# Patient Record
Sex: FEMALE | Race: WHITE | NOT HISPANIC OR LATINO | Employment: UNEMPLOYED | URBAN - METROPOLITAN AREA
[De-identification: names, ages, dates, MRNs, and addresses within clinical notes are randomized per-mention and may not be internally consistent; named-entity substitution may affect disease eponyms.]

---

## 2017-02-28 ENCOUNTER — CONVERSION ENCOUNTER (OUTPATIENT)
Dept: CARDIOLOGY | Facility: CLINIC | Age: 72
End: 2017-02-28

## 2017-06-28 ENCOUNTER — HOSPITAL ENCOUNTER (OUTPATIENT)
Dept: OTHER | Facility: HOSPITAL | Age: 72
Setting detail: RECURRING SERIES
Discharge: HOME | End: 2017-06-29
Attending: FAMILY MEDICINE | Admitting: FAMILY MEDICINE

## 2017-06-28 LAB — CALCIUM SERPL-MCNC: 8.9 MG/DL (ref 8.9–10.3)

## 2017-08-22 ENCOUNTER — OFFICE (AMBULATORY)
Dept: URBAN - METROPOLITAN AREA CLINIC 64 | Facility: CLINIC | Age: 72
End: 2017-08-22
Payer: MEDICARE

## 2017-08-22 ENCOUNTER — ON CAMPUS - OUTPATIENT (AMBULATORY)
Dept: URBAN - METROPOLITAN AREA HOSPITAL 2 | Facility: HOSPITAL | Age: 72
End: 2017-08-22

## 2017-08-22 VITALS
SYSTOLIC BLOOD PRESSURE: 103 MMHG | HEIGHT: 65 IN | DIASTOLIC BLOOD PRESSURE: 51 MMHG | HEART RATE: 57 BPM | DIASTOLIC BLOOD PRESSURE: 62 MMHG | OXYGEN SATURATION: 93 % | HEART RATE: 58 BPM | RESPIRATION RATE: 18 BRPM | SYSTOLIC BLOOD PRESSURE: 125 MMHG | OXYGEN SATURATION: 98 % | DIASTOLIC BLOOD PRESSURE: 58 MMHG | DIASTOLIC BLOOD PRESSURE: 54 MMHG | OXYGEN SATURATION: 99 % | SYSTOLIC BLOOD PRESSURE: 138 MMHG | DIASTOLIC BLOOD PRESSURE: 57 MMHG | SYSTOLIC BLOOD PRESSURE: 127 MMHG | SYSTOLIC BLOOD PRESSURE: 139 MMHG | OXYGEN SATURATION: 96 % | HEART RATE: 59 BPM | DIASTOLIC BLOOD PRESSURE: 65 MMHG | OXYGEN SATURATION: 100 % | SYSTOLIC BLOOD PRESSURE: 135 MMHG | HEART RATE: 50 BPM | WEIGHT: 175 LBS | TEMPERATURE: 96.5 F | RESPIRATION RATE: 16 BRPM | SYSTOLIC BLOOD PRESSURE: 123 MMHG | HEART RATE: 54 BPM | DIASTOLIC BLOOD PRESSURE: 68 MMHG | DIASTOLIC BLOOD PRESSURE: 44 MMHG | HEART RATE: 56 BPM | HEART RATE: 49 BPM

## 2017-08-22 DIAGNOSIS — K63.3 ULCER OF INTESTINE: ICD-10-CM

## 2017-08-22 DIAGNOSIS — Z86.010 PERSONAL HISTORY OF COLONIC POLYPS: ICD-10-CM

## 2017-08-22 DIAGNOSIS — K64.1 SECOND DEGREE HEMORRHOIDS: ICD-10-CM

## 2017-08-22 DIAGNOSIS — R19.4 CHANGE IN BOWEL HABIT: ICD-10-CM

## 2017-08-22 DIAGNOSIS — K57.30 DIVERTICULOSIS OF LARGE INTESTINE WITHOUT PERFORATION OR ABS: ICD-10-CM

## 2017-08-22 LAB
GI HISTOLOGY: A. UNSPECIFIED: (no result)
GI HISTOLOGY: B. UNSPECIFIED: (no result)
GI HISTOLOGY: PDF REPORT: (no result)

## 2017-08-22 PROCEDURE — 88305 TISSUE EXAM BY PATHOLOGIST: CPT | Mod: 26 | Performed by: INTERNAL MEDICINE

## 2017-08-22 PROCEDURE — 45380 COLONOSCOPY AND BIOPSY: CPT | Performed by: INTERNAL MEDICINE

## 2017-08-22 RX ADMIN — PROPOFOL: 10 INJECTION, EMULSION INTRAVENOUS at 13:20

## 2017-10-02 ENCOUNTER — CONVERSION ENCOUNTER (OUTPATIENT)
Dept: CARDIOLOGY | Facility: CLINIC | Age: 72
End: 2017-10-02

## 2018-01-02 ENCOUNTER — HOSPITAL ENCOUNTER (OUTPATIENT)
Dept: INFUSION THERAPY | Facility: HOSPITAL | Age: 73
Discharge: HOME | End: 2018-01-02
Attending: FAMILY MEDICINE | Admitting: FAMILY MEDICINE

## 2018-01-02 LAB — CALCIUM SERPL-MCNC: 9.3 MG/DL (ref 8.9–10.3)

## 2018-01-19 ENCOUNTER — HOSPITAL ENCOUNTER (OUTPATIENT)
Dept: OTHER | Facility: HOSPITAL | Age: 73
Discharge: HOME | End: 2018-01-19
Attending: ORTHOPAEDIC SURGERY | Admitting: ORTHOPAEDIC SURGERY

## 2018-01-19 LAB
ABO + RH BLD: NORMAL
ANION GAP SERPL CALC-SCNC: 10.1 MMOL/L (ref 10–20)
APTT BLD: 22.7 SEC (ref 24–31)
ARMBAND: NORMAL
BACTERIA SPEC AEROBE CULT: NORMAL
BASOPHILS # BLD AUTO: 0.1 10*3/UL (ref 0–0.2)
BASOPHILS NFR BLD AUTO: 1 % (ref 0–2)
BILIRUB UR QL STRIP: NEGATIVE MG/DL
BLD COMPONENT TYPE: NORMAL
BLD GP AB SCN SERPL QL: NEGATIVE
BPU ID: NORMAL
BPU ID: NORMAL
BUN SERPL-MCNC: 14 MG/DL (ref 8–20)
BUN/CREAT SERPL: 15.6 (ref 5.4–26.2)
CALCIUM SERPL-MCNC: 9.2 MG/DL (ref 8.9–10.3)
CASTS URNS QL MICRO: NORMAL /[LPF]
CHLORIDE SERPL-SCNC: 104 MMOL/L (ref 101–111)
COLOR UR: YELLOW
CONV BACTERIA IN URINE MICRO: NEGATIVE
CONV CLARITY OF URINE: CLEAR
CONV CO2: 28 MMOL/L (ref 22–32)
CONV HYALINE CASTS IN URINE MICRO: 1 /[LPF] (ref 0–5)
CONV PRODUCT 1 STATUS: NORMAL
CONV PRODUCT 1 STATUS: NORMAL
CONV PROTEIN IN URINE BY AUTOMATED TEST STRIP: NEGATIVE MG/DL
CONV SMALL ROUND CELLS: NORMAL /[HPF]
CONV UROBILINOGEN IN URINE BY AUTOMATED TEST STRIP: 0.2 MG/DL
CREAT UR-MCNC: 0.9 MG/DL (ref 0.4–1)
CROSSMATCH EXPIRATION: NORMAL
CROSSMATCH INTERPRETATION: NORMAL
CROSSMATCH INTERPRETATION: NORMAL
CROSSMATCH: NORMAL
CULTURE INDICATED?: NORMAL
DIFFERENTIAL METHOD BLD: (no result)
EOSINOPHIL # BLD AUTO: 0.2 10*3/UL (ref 0–0.3)
EOSINOPHIL # BLD AUTO: 4 % (ref 0–3)
ERYTHROCYTE [DISTWIDTH] IN BLOOD BY AUTOMATED COUNT: 14 % (ref 11.5–14.5)
GLUCOSE SERPL-MCNC: 79 MG/DL (ref 65–99)
GLUCOSE UR QL: NEGATIVE MG/DL
HCT VFR BLD AUTO: 38.2 % (ref 35–49)
HGB BLD-MCNC: 12.8 G/DL (ref 12–15)
HGB UR QL STRIP: NEGATIVE
INR PPP: 0.9
KETONES UR QL STRIP: NEGATIVE MG/DL
LEUKOCYTE ESTERASE UR QL STRIP: NEGATIVE
LYMPHOCYTES # BLD AUTO: 1.9 10*3/UL (ref 0.8–4.8)
LYMPHOCYTES NFR BLD AUTO: 30 % (ref 18–42)
Lab: NORMAL
MCH RBC QN AUTO: 30 PG (ref 26–32)
MCHC RBC AUTO-ENTMCNC: 33.6 G/DL (ref 32–36)
MCV RBC AUTO: 89.3 FL (ref 80–94)
MICRO REPORT STATUS: NORMAL
MONOCYTES # BLD AUTO: 0.6 10*3/UL (ref 0.1–1.3)
MONOCYTES NFR BLD AUTO: 10 % (ref 2–11)
NEUTROPHILS # BLD AUTO: 3.6 10*3/UL (ref 2.3–8.6)
NEUTROPHILS NFR BLD AUTO: 55 % (ref 50–75)
NITRITE UR QL STRIP: NEGATIVE
NRBC BLD AUTO-RTO: 0 /100{WBCS}
NRBC/RBC NFR BLD MANUAL: 0 10*3/UL
NUM BPU REQUESTED: 2
PH UR STRIP.AUTO: 5.5 [PH] (ref 4.5–8)
PLATELET # BLD AUTO: 203 10*3/UL (ref 150–450)
PMV BLD AUTO: 7.9 FL (ref 7.4–10.4)
POTASSIUM SERPL-SCNC: 4.1 MMOL/L (ref 3.6–5.1)
PRE-HGB: 12.8 MG/DL
PROTHROMBIN TIME: 10.2 SEC (ref 9.6–11.7)
RBC # BLD AUTO: 4.27 10*6/UL (ref 4–5.4)
RBC #/AREA URNS HPF: 0 /[HPF] (ref 0–3)
SODIUM SERPL-SCNC: 138 MMOL/L (ref 136–144)
SP GR UR: 1.01 (ref 1–1.03)
SPECIMEN SOURCE: NORMAL
SPERM URNS QL MICRO: NORMAL /[HPF]
SQUAMOUS SPT QL MICRO: 0 /[HPF] (ref 0–5)
TRANS STATUS: NORMAL
TRANS STATUS: NORMAL
UNIDENT CRYS URNS QL MICRO: NORMAL /[HPF]
UNIT DIVISION: 0
UNIT DIVISION: 0
WBC # BLD AUTO: 6.4 10*3/UL (ref 4.5–11.5)
WBC #/AREA URNS HPF: 0 /[HPF] (ref 0–5)
YEAST SPEC QL WET PREP: NORMAL /[HPF]

## 2018-01-28 ENCOUNTER — HOSPITAL ENCOUNTER (OUTPATIENT)
Dept: FAMILY MEDICINE CLINIC | Facility: CLINIC | Age: 73
Setting detail: SPECIMEN
Discharge: HOME | End: 2018-01-28
Attending: ORTHOPAEDIC SURGERY | Admitting: ORTHOPAEDIC SURGERY

## 2018-01-28 LAB
BASOPHILS # BLD AUTO: 0 10*3/UL (ref 0–0.2)
BASOPHILS NFR BLD AUTO: 0 % (ref 0–2)
DIFFERENTIAL METHOD BLD: (no result)
EOSINOPHIL # BLD AUTO: 0.3 10*3/UL (ref 0–0.3)
EOSINOPHIL # BLD AUTO: 5 % (ref 0–3)
ERYTHROCYTE [DISTWIDTH] IN BLOOD BY AUTOMATED COUNT: 13.7 % (ref 11.5–14.5)
HCT VFR BLD AUTO: 23.8 % (ref 35–49)
HGB BLD-MCNC: (no result) G/DL (ref 12–15)
LYMPHOCYTES # BLD AUTO: 1.1 10*3/UL (ref 0.8–4.8)
LYMPHOCYTES NFR BLD AUTO: 17 % (ref 18–42)
MCH RBC QN AUTO: 29.9 PG (ref 26–32)
MCHC RBC AUTO-ENTMCNC: 33.4 G/DL (ref 32–36)
MCV RBC AUTO: 89.6 FL (ref 80–94)
MONOCYTES # BLD AUTO: 0.6 10*3/UL (ref 0.1–1.3)
MONOCYTES NFR BLD AUTO: 10 % (ref 2–11)
NEUTROPHILS # BLD AUTO: 4.3 10*3/UL (ref 2.3–8.6)
NEUTROPHILS NFR BLD AUTO: 68 % (ref 50–75)
NRBC BLD AUTO-RTO: 0 /100{WBCS}
NRBC/RBC NFR BLD MANUAL: 0 10*3/UL
PLATELET # BLD AUTO: 122 10*3/UL (ref 150–450)
PMV BLD AUTO: 8.7 FL (ref 7.4–10.4)
RBC # BLD AUTO: 2.66 10*6/UL (ref 4–5.4)
WBC # BLD AUTO: 6.4 10*3/UL (ref 4.5–11.5)

## 2018-03-20 ENCOUNTER — OFFICE (AMBULATORY)
Dept: URBAN - METROPOLITAN AREA CLINIC 64 | Facility: CLINIC | Age: 73
End: 2018-03-20

## 2018-03-20 VITALS
SYSTOLIC BLOOD PRESSURE: 104 MMHG | HEART RATE: 62 BPM | DIASTOLIC BLOOD PRESSURE: 62 MMHG | HEIGHT: 65 IN | WEIGHT: 165 LBS

## 2018-03-20 DIAGNOSIS — Z86.010 PERSONAL HISTORY OF COLONIC POLYPS: ICD-10-CM

## 2018-03-20 DIAGNOSIS — D64.89 OTHER SPECIFIED ANEMIAS: ICD-10-CM

## 2018-03-20 DIAGNOSIS — K21.9 GASTRO-ESOPHAGEAL REFLUX DISEASE WITHOUT ESOPHAGITIS: ICD-10-CM

## 2018-03-20 DIAGNOSIS — R53.83 OTHER FATIGUE: ICD-10-CM

## 2018-03-20 LAB
CBC WITH DIFFERENTIAL/PLATELET: BASO (ABSOLUTE): 0 X10E3/UL (ref 0–0.2)
CBC WITH DIFFERENTIAL/PLATELET: BASOS: 1 %
CBC WITH DIFFERENTIAL/PLATELET: EOS (ABSOLUTE): 0.5 X10E3/UL — HIGH (ref 0–0.4)
CBC WITH DIFFERENTIAL/PLATELET: EOS: 7 %
CBC WITH DIFFERENTIAL/PLATELET: HEMATOCRIT: 33.7 % — LOW (ref 34–46.6)
CBC WITH DIFFERENTIAL/PLATELET: HEMATOLOGY COMMENTS: (no result)
CBC WITH DIFFERENTIAL/PLATELET: HEMOGLOBIN: 10.8 G/DL — LOW (ref 11.1–15.9)
CBC WITH DIFFERENTIAL/PLATELET: IMMATURE CELLS: (no result)
CBC WITH DIFFERENTIAL/PLATELET: IMMATURE GRANS (ABS): 0 X10E3/UL (ref 0–0.1)
CBC WITH DIFFERENTIAL/PLATELET: IMMATURE GRANULOCYTES: 0 %
CBC WITH DIFFERENTIAL/PLATELET: LYMPHS (ABSOLUTE): 2.2 X10E3/UL (ref 0.7–3.1)
CBC WITH DIFFERENTIAL/PLATELET: LYMPHS: 29 %
CBC WITH DIFFERENTIAL/PLATELET: MCH: 25.8 PG — LOW (ref 26.6–33)
CBC WITH DIFFERENTIAL/PLATELET: MCHC: 32 G/DL (ref 31.5–35.7)
CBC WITH DIFFERENTIAL/PLATELET: MCV: 80 FL (ref 79–97)
CBC WITH DIFFERENTIAL/PLATELET: MONOCYTES(ABSOLUTE): 0.7 X10E3/UL (ref 0.1–0.9)
CBC WITH DIFFERENTIAL/PLATELET: MONOCYTES: 9 %
CBC WITH DIFFERENTIAL/PLATELET: NEUTROPHILS (ABSOLUTE): 4.1 X10E3/UL (ref 1.4–7)
CBC WITH DIFFERENTIAL/PLATELET: NEUTROPHILS: 54 %
CBC WITH DIFFERENTIAL/PLATELET: NRBC: (no result)
CBC WITH DIFFERENTIAL/PLATELET: PLATELETS: 281 X10E3/UL (ref 150–379)
CBC WITH DIFFERENTIAL/PLATELET: RBC: 4.19 X10E6/UL (ref 3.77–5.28)
CBC WITH DIFFERENTIAL/PLATELET: RDW: 15 % (ref 12.3–15.4)
CBC WITH DIFFERENTIAL/PLATELET: WBC: 7.6 X10E3/UL (ref 3.4–10.8)
FERRITIN, SERUM: 35 NG/ML (ref 15–150)
IRON AND TIBC: IRON BIND.CAP.(TIBC): 334 UG/DL (ref 250–450)
IRON AND TIBC: IRON SATURATION: 16 % (ref 15–55)
IRON AND TIBC: IRON, SERUM: 55 UG/DL (ref 27–139)
IRON AND TIBC: UIBC: 279 UG/DL (ref 118–369)

## 2018-03-20 PROCEDURE — 99214 OFFICE O/P EST MOD 30 MIN: CPT | Performed by: NURSE PRACTITIONER

## 2018-03-22 ENCOUNTER — HOSPITAL ENCOUNTER (OUTPATIENT)
Dept: ULTRASOUND IMAGING | Facility: HOSPITAL | Age: 73
Discharge: HOME | End: 2018-03-22
Attending: NURSE PRACTITIONER | Admitting: FAMILY MEDICINE

## 2018-04-02 ENCOUNTER — CONVERSION ENCOUNTER (OUTPATIENT)
Dept: CARDIOLOGY | Facility: CLINIC | Age: 73
End: 2018-04-02

## 2018-04-17 ENCOUNTER — ON CAMPUS - OUTPATIENT (AMBULATORY)
Dept: URBAN - METROPOLITAN AREA HOSPITAL 2 | Facility: HOSPITAL | Age: 73
End: 2018-04-17

## 2018-04-17 ENCOUNTER — OFFICE (AMBULATORY)
Dept: URBAN - METROPOLITAN AREA PATHOLOGY 4 | Facility: PATHOLOGY | Age: 73
End: 2018-04-17
Payer: MEDICARE

## 2018-04-17 VITALS
DIASTOLIC BLOOD PRESSURE: 70 MMHG | DIASTOLIC BLOOD PRESSURE: 84 MMHG | TEMPERATURE: 97.3 F | OXYGEN SATURATION: 95 % | DIASTOLIC BLOOD PRESSURE: 74 MMHG | RESPIRATION RATE: 16 BRPM | OXYGEN SATURATION: 100 % | DIASTOLIC BLOOD PRESSURE: 71 MMHG | SYSTOLIC BLOOD PRESSURE: 132 MMHG | DIASTOLIC BLOOD PRESSURE: 55 MMHG | SYSTOLIC BLOOD PRESSURE: 131 MMHG | HEART RATE: 64 BPM | HEART RATE: 62 BPM | DIASTOLIC BLOOD PRESSURE: 62 MMHG | HEART RATE: 69 BPM | SYSTOLIC BLOOD PRESSURE: 146 MMHG | HEIGHT: 65 IN | OXYGEN SATURATION: 97 % | RESPIRATION RATE: 20 BRPM | OXYGEN SATURATION: 98 % | RESPIRATION RATE: 18 BRPM | SYSTOLIC BLOOD PRESSURE: 127 MMHG | WEIGHT: 159 LBS | SYSTOLIC BLOOD PRESSURE: 135 MMHG | HEART RATE: 79 BPM | SYSTOLIC BLOOD PRESSURE: 139 MMHG | HEART RATE: 60 BPM

## 2018-04-17 DIAGNOSIS — D50.0 IRON DEFICIENCY ANEMIA SECONDARY TO BLOOD LOSS (CHRONIC): ICD-10-CM

## 2018-04-17 DIAGNOSIS — K21.9 GASTRO-ESOPHAGEAL REFLUX DISEASE WITHOUT ESOPHAGITIS: ICD-10-CM

## 2018-04-17 LAB
GI HISTOLOGY: A. UNSPECIFIED: (no result)
GI HISTOLOGY: PDF REPORT: (no result)

## 2018-04-17 PROCEDURE — 43239 EGD BIOPSY SINGLE/MULTIPLE: CPT | Performed by: INTERNAL MEDICINE

## 2018-04-17 PROCEDURE — 88305 TISSUE EXAM BY PATHOLOGIST: CPT | Mod: 26 | Performed by: INTERNAL MEDICINE

## 2018-04-17 RX ORDER — FERROUS SULFATE TAB EC 325 MG (65 MG FE EQUIVALENT) 325 (65 FE) MG
325 TABLET DELAYED RESPONSE ORAL
Qty: 90 | Refills: 1 | Status: COMPLETED
Start: 2018-04-17 | End: 2019-10-15

## 2018-04-17 RX ADMIN — PROPOFOL: 10 INJECTION, EMULSION INTRAVENOUS at 13:53

## 2018-05-01 ENCOUNTER — OFFICE (AMBULATORY)
Dept: URBAN - METROPOLITAN AREA CLINIC 64 | Facility: CLINIC | Age: 73
End: 2018-05-01

## 2018-05-01 VITALS
HEART RATE: 58 BPM | WEIGHT: 164 LBS | HEIGHT: 65 IN | SYSTOLIC BLOOD PRESSURE: 124 MMHG | DIASTOLIC BLOOD PRESSURE: 62 MMHG

## 2018-05-01 DIAGNOSIS — R14.0 ABDOMINAL DISTENSION (GASEOUS): ICD-10-CM

## 2018-05-01 DIAGNOSIS — R53.83 OTHER FATIGUE: ICD-10-CM

## 2018-05-01 DIAGNOSIS — D50.0 IRON DEFICIENCY ANEMIA SECONDARY TO BLOOD LOSS (CHRONIC): ICD-10-CM

## 2018-05-01 DIAGNOSIS — K21.9 GASTRO-ESOPHAGEAL REFLUX DISEASE WITHOUT ESOPHAGITIS: ICD-10-CM

## 2018-05-01 DIAGNOSIS — Z86.010 PERSONAL HISTORY OF COLONIC POLYPS: ICD-10-CM

## 2018-05-01 PROCEDURE — 99213 OFFICE O/P EST LOW 20 MIN: CPT | Performed by: NURSE PRACTITIONER

## 2018-07-09 ENCOUNTER — OFFICE (AMBULATORY)
Dept: URBAN - METROPOLITAN AREA CLINIC 64 | Facility: CLINIC | Age: 73
End: 2018-07-09

## 2018-07-09 VITALS
SYSTOLIC BLOOD PRESSURE: 125 MMHG | HEIGHT: 65 IN | HEART RATE: 68 BPM | WEIGHT: 168 LBS | DIASTOLIC BLOOD PRESSURE: 62 MMHG

## 2018-07-09 DIAGNOSIS — K59.00 CONSTIPATION, UNSPECIFIED: ICD-10-CM

## 2018-07-09 DIAGNOSIS — K21.9 GASTRO-ESOPHAGEAL REFLUX DISEASE WITHOUT ESOPHAGITIS: ICD-10-CM

## 2018-07-09 DIAGNOSIS — R53.83 OTHER FATIGUE: ICD-10-CM

## 2018-07-09 DIAGNOSIS — D50.0 IRON DEFICIENCY ANEMIA SECONDARY TO BLOOD LOSS (CHRONIC): ICD-10-CM

## 2018-07-09 PROCEDURE — 99213 OFFICE O/P EST LOW 20 MIN: CPT | Performed by: NURSE PRACTITIONER

## 2018-10-01 ENCOUNTER — CONVERSION ENCOUNTER (OUTPATIENT)
Dept: CARDIOLOGY | Facility: CLINIC | Age: 73
End: 2018-10-01

## 2018-10-10 ENCOUNTER — OFFICE (AMBULATORY)
Dept: URBAN - METROPOLITAN AREA CLINIC 64 | Facility: CLINIC | Age: 73
End: 2018-10-10
Payer: MEDICARE

## 2018-10-10 VITALS
HEART RATE: 66 BPM | DIASTOLIC BLOOD PRESSURE: 62 MMHG | SYSTOLIC BLOOD PRESSURE: 118 MMHG | WEIGHT: 167 LBS | HEIGHT: 65 IN

## 2018-10-10 DIAGNOSIS — R11.0 NAUSEA: ICD-10-CM

## 2018-10-10 DIAGNOSIS — D50.9 IRON DEFICIENCY ANEMIA, UNSPECIFIED: ICD-10-CM

## 2018-10-10 DIAGNOSIS — K21.9 GASTRO-ESOPHAGEAL REFLUX DISEASE WITHOUT ESOPHAGITIS: ICD-10-CM

## 2018-10-10 DIAGNOSIS — K59.00 CONSTIPATION, UNSPECIFIED: ICD-10-CM

## 2018-10-10 PROCEDURE — 99214 OFFICE O/P EST MOD 30 MIN: CPT | Performed by: NURSE PRACTITIONER

## 2018-10-10 RX ORDER — ONDANSETRON HYDROCHLORIDE 4 MG/1
16 TABLET, ORALLY DISINTEGRATING ORAL
Qty: 60 | Refills: 0 | Status: COMPLETED
Start: 2018-10-10 | End: 2021-10-20

## 2018-10-10 RX ORDER — FERROUS SULFATE TAB EC 325 MG (65 MG FE EQUIVALENT) 325 (65 FE) MG
325 TABLET DELAYED RESPONSE ORAL
Qty: 90 | Refills: 1 | Status: COMPLETED
Start: 2018-04-17 | End: 2019-10-15

## 2019-01-02 ENCOUNTER — OFFICE (AMBULATORY)
Dept: URBAN - METROPOLITAN AREA CLINIC 64 | Facility: CLINIC | Age: 74
End: 2019-01-02

## 2019-01-02 VITALS
DIASTOLIC BLOOD PRESSURE: 78 MMHG | HEART RATE: 63 BPM | WEIGHT: 168 LBS | SYSTOLIC BLOOD PRESSURE: 131 MMHG | HEIGHT: 65 IN

## 2019-01-02 DIAGNOSIS — R11.0 NAUSEA: ICD-10-CM

## 2019-01-02 DIAGNOSIS — D50.9 IRON DEFICIENCY ANEMIA, UNSPECIFIED: ICD-10-CM

## 2019-01-02 DIAGNOSIS — K59.00 CONSTIPATION, UNSPECIFIED: ICD-10-CM

## 2019-01-02 PROCEDURE — 99213 OFFICE O/P EST LOW 20 MIN: CPT | Performed by: NURSE PRACTITIONER

## 2019-03-28 ENCOUNTER — CONVERSION ENCOUNTER (OUTPATIENT)
Dept: CARDIOLOGY | Facility: CLINIC | Age: 74
End: 2019-03-28

## 2019-04-01 ENCOUNTER — CONVERSION ENCOUNTER (OUTPATIENT)
Dept: CARDIOLOGY | Facility: CLINIC | Age: 74
End: 2019-04-01

## 2019-04-23 ENCOUNTER — OFFICE (AMBULATORY)
Dept: URBAN - METROPOLITAN AREA CLINIC 64 | Facility: CLINIC | Age: 74
End: 2019-04-23

## 2019-04-23 VITALS
DIASTOLIC BLOOD PRESSURE: 66 MMHG | HEART RATE: 65 BPM | WEIGHT: 168 LBS | HEIGHT: 65 IN | SYSTOLIC BLOOD PRESSURE: 117 MMHG

## 2019-04-23 DIAGNOSIS — K21.9 GASTRO-ESOPHAGEAL REFLUX DISEASE WITHOUT ESOPHAGITIS: ICD-10-CM

## 2019-04-23 DIAGNOSIS — D50.0 IRON DEFICIENCY ANEMIA SECONDARY TO BLOOD LOSS (CHRONIC): ICD-10-CM

## 2019-04-23 DIAGNOSIS — D51.9 VITAMIN B12 DEFICIENCY ANEMIA, UNSPECIFIED: ICD-10-CM

## 2019-04-23 PROCEDURE — 99213 OFFICE O/P EST LOW 20 MIN: CPT | Performed by: INTERNAL MEDICINE

## 2019-06-04 VITALS
DIASTOLIC BLOOD PRESSURE: 71 MMHG | HEART RATE: 65 BPM | BODY MASS INDEX: 27.59 KG/M2 | HEART RATE: 63 BPM | OXYGEN SATURATION: 96 % | BODY MASS INDEX: 27.83 KG/M2 | SYSTOLIC BLOOD PRESSURE: 124 MMHG | WEIGHT: 166 LBS | HEART RATE: 71 BPM | WEIGHT: 167.25 LBS | SYSTOLIC BLOOD PRESSURE: 109 MMHG | HEART RATE: 73 BPM | WEIGHT: 165 LBS | OXYGEN SATURATION: 97 % | WEIGHT: 165.6 LBS | SYSTOLIC BLOOD PRESSURE: 122 MMHG | DIASTOLIC BLOOD PRESSURE: 64 MMHG | HEART RATE: 60 BPM | WEIGHT: 173.5 LBS | DIASTOLIC BLOOD PRESSURE: 66 MMHG | RESPIRATION RATE: 16 BRPM | DIASTOLIC BLOOD PRESSURE: 86 MMHG | OXYGEN SATURATION: 96 % | RESPIRATION RATE: 16 BRPM | DIASTOLIC BLOOD PRESSURE: 65 MMHG | DIASTOLIC BLOOD PRESSURE: 66 MMHG | OXYGEN SATURATION: 96 % | HEIGHT: 65 IN | WEIGHT: 171.5 LBS | SYSTOLIC BLOOD PRESSURE: 142 MMHG | SYSTOLIC BLOOD PRESSURE: 136 MMHG | SYSTOLIC BLOOD PRESSURE: 126 MMHG | HEART RATE: 58 BPM

## 2019-06-13 ENCOUNTER — TRANSCRIBE ORDERS (OUTPATIENT)
Dept: CT IMAGING | Facility: HOSPITAL | Age: 74
End: 2019-06-13

## 2019-06-13 DIAGNOSIS — G44.89 HEADACHE SYNDROME: Primary | ICD-10-CM

## 2019-06-24 ENCOUNTER — HOSPITAL ENCOUNTER (OUTPATIENT)
Dept: CT IMAGING | Facility: HOSPITAL | Age: 74
Discharge: HOME | End: 2019-06-24
Admitting: FAMILY MEDICINE

## 2019-06-24 DIAGNOSIS — G44.89 HEADACHE SYNDROME: ICD-10-CM

## 2019-06-24 PROCEDURE — 70450 CT HEAD/BRAIN W/O DYE: CPT

## 2019-07-25 RX ORDER — PROPRANOLOL HYDROCHLORIDE 120 MG/1
CAPSULE, EXTENDED RELEASE ORAL
Qty: 90 CAPSULE | Refills: 3 | Status: SHIPPED | OUTPATIENT
Start: 2019-07-25 | End: 2020-07-20

## 2019-10-01 ENCOUNTER — OFFICE VISIT (OUTPATIENT)
Dept: CARDIOLOGY | Facility: CLINIC | Age: 74
End: 2019-10-01

## 2019-10-01 VITALS
SYSTOLIC BLOOD PRESSURE: 118 MMHG | HEART RATE: 64 BPM | BODY MASS INDEX: 27.87 KG/M2 | OXYGEN SATURATION: 97 % | DIASTOLIC BLOOD PRESSURE: 67 MMHG | WEIGHT: 167.5 LBS

## 2019-10-01 DIAGNOSIS — I45.10 INCOMPLETE RIGHT BUNDLE BRANCH BLOCK: ICD-10-CM

## 2019-10-01 DIAGNOSIS — R00.2 PALPITATIONS: Primary | ICD-10-CM

## 2019-10-01 DIAGNOSIS — Z98.890 HISTORY OF LOOP RECORDER: ICD-10-CM

## 2019-10-01 DIAGNOSIS — I49.5 TACHYCARDIA-BRADYCARDIA (HCC): ICD-10-CM

## 2019-10-01 PROCEDURE — 99214 OFFICE O/P EST MOD 30 MIN: CPT | Performed by: INTERNAL MEDICINE

## 2019-10-01 PROCEDURE — 93000 ELECTROCARDIOGRAM COMPLETE: CPT | Performed by: INTERNAL MEDICINE

## 2019-10-01 RX ORDER — SUMATRIPTAN 50 MG/1
50 TABLET, FILM COATED ORAL ONCE AS NEEDED
COMMUNITY
Start: 2019-09-27 | End: 2020-12-10

## 2019-10-01 RX ORDER — DIPHENHYDRAMINE HCL 25 MG
25 CAPSULE ORAL EVERY 6 HOURS PRN
COMMUNITY

## 2019-10-01 RX ORDER — ASPIRIN 81 MG/1
81 TABLET ORAL NIGHTLY
COMMUNITY
Start: 2014-05-07

## 2019-10-01 RX ORDER — CITALOPRAM 40 MG/1
TABLET ORAL
COMMUNITY
Start: 2019-07-16 | End: 2020-06-03 | Stop reason: ALTCHOICE

## 2019-10-01 RX ORDER — OMEPRAZOLE 40 MG/1
40 CAPSULE, DELAYED RELEASE ORAL DAILY
COMMUNITY
Start: 2019-08-31

## 2019-10-01 RX ORDER — CYCLOSPORINE 0.5 MG/ML
1 EMULSION OPHTHALMIC EVERY 12 HOURS
COMMUNITY
Start: 2019-08-27

## 2019-10-01 RX ORDER — MELATONIN
1000 DAILY
COMMUNITY

## 2019-10-01 RX ORDER — RANOLAZINE 500 MG/1
500 TABLET, EXTENDED RELEASE ORAL 2 TIMES DAILY
COMMUNITY
Start: 2019-08-31

## 2019-10-01 RX ORDER — MONTELUKAST SODIUM 10 MG/1
10 TABLET ORAL NIGHTLY
COMMUNITY
Start: 2019-08-29

## 2019-10-01 RX ORDER — MULTIVIT-MIN/FA/LYCOPEN/LUTEIN .4-300-25
TABLET ORAL
Status: ON HOLD | COMMUNITY
Start: 2018-04-02 | End: 2022-09-02

## 2019-10-01 RX ORDER — PHENOL 1.4 %
600 AEROSOL, SPRAY (ML) MUCOUS MEMBRANE 2 TIMES DAILY WITH MEALS
COMMUNITY

## 2019-10-01 NOTE — PROGRESS NOTES
Encounter Date:10/01/2019  Last seen 4/1/2019      Patient ID: Joie Godinez is a 74 y.o. female.    Chief Complaint:  Palpitations  Tachycardia bradycardia syndrome  Loop recorder  Incomplete right bundle branch block    History of Present Illness  Rare palpitations.    Since I have last seen, the patient has been without any chest discomfort ,shortness of breath,, dizziness or syncope.  Denies having any headache ,abdominal pain ,nausea, vomiting , diarrhea constipation, loss of weight or loss of appetite.  Denies having any excessive bruising ,hematuria or blood in the stool.    Review of all systems negative except as indicated  Assessment and Plan       [[[[[[[[[[[[[[[[[    Impression  ================.  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 no significant problems since last visit.    -status post loop recorder placement ([employer]) 05/13/2016    -incomplete right bundle-branch block and premature ventricular contractions.     - chest discomfort shortness of breath and jaw discomfort.  Possible angina pectoris.  Patient is better with isosorbide.    Cardiac catheterization  11/09/2015 revealed normal Coronary arteries and normal left ventricular function.    Echocardiogram showed mild left atrial enlargement and mild mitral regurgitation ( 10/22/2015 )     - status post cholecystectomy hysterectomy left hip screw  placement bladder sling surgery and left knee surgery .  History of hip surgery.    -allergy to Claritin Bactrim and IVP dye and Ultram  =================    Plan  =============  EKG showed sinus rhythm without any ischemic changes   patient is not having any angina pectoris or congestive heart failure   palpitations are well controlled  Medications were reviewed and updated.  Have discussed about leaving the up recorder in place for the time being and can be removed when or  patient wants it.   followup in the office in 6 months with loop recorder interrogation  [[[[[[[[[[[           Diagnosis Plan   1. Palpitations  ECG 12 Lead   2. Tachycardia-bradycardia (CMS/HCC)     3. Incomplete right bundle branch block     4. History of loop recorder     LAB RESULTS (LAST 7 DAYS)    CBC        BMP        CMP         BNP        TROPONIN        CoAg        Creatinine Clearance  CrCl cannot be calculated (Patient's most recent lab result is older than the maximum 30 days allowed.).    ABG        Radiology  No radiology results for the last day                The following portions of the patient's history were reviewed and updated as appropriate: allergies, current medications, past family history, past medical history, past social history, past surgical history and problem list.    Review of Systems   Constitution: Negative for malaise/fatigue.   Cardiovascular: Negative for chest pain, leg swelling, palpitations and syncope.   Respiratory: Positive for shortness of breath.    Skin: Negative for rash.   Gastrointestinal: Positive for nausea. Negative for vomiting.   Neurological: Positive for light-headedness. Negative for dizziness and numbness.         Current Outpatient Medications:   •  aspirin (PITO LOW DOSE) 81 MG EC tablet, PITO LOW DOSE 81 MG TBEC, Disp: , Rfl:   •  calcium carbonate (OS-RAVEN) 600 MG tablet, Take 600 mg by mouth Daily., Disp: , Rfl:   •  cholecalciferol (VITAMIN D3) 1000 units tablet, Take 1,000 Units by mouth Daily., Disp: , Rfl:   •  citalopram (CeleXA) 40 MG tablet, , Disp: , Rfl:   •  diphenhydrAMINE (BENADRYL) 25 mg capsule, Take 25 mg by mouth Every 6 (Six) Hours As Needed for Itching., Disp: , Rfl:   •  montelukast (SINGULAIR) 10 MG tablet, , Disp: , Rfl:   •  Multiple Vitamins-Minerals (CENTRUM SILVER ADULT 50+) tablet, CENTRUM SILVER ADULT 50+ TABS, Disp: , Rfl:   •  omeprazole (priLOSEC) 40 MG capsule, , Disp: , Rfl:   •  propranolol LA (INDERAL LA) 120 MG 24 hr  capsule, TAKE 1 CAPSULE EVERY MORNING, Disp: 90 capsule, Rfl: 3  •  ranolazine (RANEXA) 500 MG 12 hr tablet, , Disp: , Rfl:   •  RESTASIS 0.05 % ophthalmic emulsion, , Disp: , Rfl:   •  SUMAtriptan (IMITREX) 50 MG tablet, Take 50 mg by mouth 1 (One) Time As Needed., Disp: , Rfl:     Allergies   Allergen Reactions   • Contrast Dye Unknown (See Comments)   • Hydrocodone Unknown (See Comments)   • Oxycodone Unknown (See Comments)   • Sulfamethoxazole-Trimethoprim Hives   • Loratadine Other (See Comments)   • Tramadol Hcl Unknown (See Comments)       Family History   Problem Relation Age of Onset   • Hypertension Mother    • Asthma Mother        History reviewed. No pertinent surgical history.    Past Medical History:   Diagnosis Date   • Anemia        Family History   Problem Relation Age of Onset   • Hypertension Mother    • Asthma Mother        Social History     Socioeconomic History   • Marital status:      Spouse name: Not on file   • Number of children: Not on file   • Years of education: Not on file   • Highest education level: Not on file   Tobacco Use   • Smoking status: Former Smoker   • Smokeless tobacco: Never Used   Substance and Sexual Activity   • Alcohol use: Yes     Comment: wine with dinner           ECG 12 Lead  Date/Time: 10/1/2019 2:02 PM  Performed by: Otis Patel MD  Authorized by: Otis Patel MD   Comparison: compared with previous ECG   Comments: Normal sinus rhythm nonspecific ST-T wave changes 63/min normal axis normal intervals no ectopy no change from 4/1/2019              Objective:       Physical Exam    /67   Pulse 64   Wt 76 kg (167 lb 8 oz)   SpO2 97%   BMI 27.87 kg/m²   The patient is alert, oriented and in no distress.    Vital signs as noted above.    Head and neck revealed no carotid bruits or jugular venous distension.  No thyromegaly or lymphadenopathy is present.    Lungs clear.  No wheezing.  Breath sounds are normal bilaterally.    Heart normal  first and second heart sounds.  No murmur..  No pericardial rub is present.  No gallop is present.    Abdomen soft and nontender.  No organomegaly is present.    Extremities revealed good peripheral pulses without any pedal edema.    Skin warm and dry.  Loop recorder site looks normal.    Musculoskeletal system is grossly normal.    CNS grossly normal.

## 2019-10-15 ENCOUNTER — OFFICE (AMBULATORY)
Dept: URBAN - METROPOLITAN AREA CLINIC 64 | Facility: CLINIC | Age: 74
End: 2019-10-15

## 2019-10-15 VITALS
DIASTOLIC BLOOD PRESSURE: 56 MMHG | HEART RATE: 67 BPM | SYSTOLIC BLOOD PRESSURE: 124 MMHG | HEIGHT: 65 IN | WEIGHT: 168 LBS

## 2019-10-15 DIAGNOSIS — K59.00 CONSTIPATION, UNSPECIFIED: ICD-10-CM

## 2019-10-15 DIAGNOSIS — R19.4 CHANGE IN BOWEL HABIT: ICD-10-CM

## 2019-10-15 DIAGNOSIS — R11.0 NAUSEA: ICD-10-CM

## 2019-10-15 DIAGNOSIS — K21.9 GASTRO-ESOPHAGEAL REFLUX DISEASE WITHOUT ESOPHAGITIS: ICD-10-CM

## 2019-10-15 PROCEDURE — 99213 OFFICE O/P EST LOW 20 MIN: CPT | Performed by: INTERNAL MEDICINE

## 2020-01-14 ENCOUNTER — APPOINTMENT (OUTPATIENT)
Dept: LAB | Facility: HOSPITAL | Age: 75
End: 2020-01-14

## 2020-03-26 ENCOUNTER — TELEPHONE (OUTPATIENT)
Dept: CARDIOLOGY | Facility: CLINIC | Age: 75
End: 2020-03-26

## 2020-03-26 NOTE — TELEPHONE ENCOUNTER
Called patient to see if they felt like they needed to be seen in the office. Patient stated they are having no cardiac issues and would like to reschedule appointment. Patient made aware they can call for sooner appointment if they start having any issues.

## 2020-04-08 ENCOUNTER — OFFICE (AMBULATORY)
Dept: URBAN - METROPOLITAN AREA CLINIC 64 | Facility: CLINIC | Age: 75
End: 2020-04-08
Payer: MEDICARE

## 2020-04-08 VITALS — HEIGHT: 65 IN

## 2020-04-08 DIAGNOSIS — K21.9 GASTRO-ESOPHAGEAL REFLUX DISEASE WITHOUT ESOPHAGITIS: ICD-10-CM

## 2020-04-08 DIAGNOSIS — K59.00 CONSTIPATION, UNSPECIFIED: ICD-10-CM

## 2020-04-08 DIAGNOSIS — D50.0 IRON DEFICIENCY ANEMIA SECONDARY TO BLOOD LOSS (CHRONIC): ICD-10-CM

## 2020-04-08 PROCEDURE — 99213 OFFICE O/P EST LOW 20 MIN: CPT | Mod: 95 | Performed by: INTERNAL MEDICINE

## 2020-06-03 ENCOUNTER — OFFICE VISIT (OUTPATIENT)
Dept: CARDIOLOGY | Facility: CLINIC | Age: 75
End: 2020-06-03

## 2020-06-03 VITALS
SYSTOLIC BLOOD PRESSURE: 142 MMHG | WEIGHT: 167 LBS | HEART RATE: 71 BPM | OXYGEN SATURATION: 96 % | DIASTOLIC BLOOD PRESSURE: 72 MMHG | BODY MASS INDEX: 27.79 KG/M2

## 2020-06-03 DIAGNOSIS — I45.10 INCOMPLETE RIGHT BUNDLE BRANCH BLOCK: ICD-10-CM

## 2020-06-03 DIAGNOSIS — I49.5 TACHYCARDIA-BRADYCARDIA (HCC): ICD-10-CM

## 2020-06-03 DIAGNOSIS — R00.2 PALPITATIONS: Primary | ICD-10-CM

## 2020-06-03 DIAGNOSIS — Z98.890 HISTORY OF LOOP RECORDER: ICD-10-CM

## 2020-06-03 PROCEDURE — 99214 OFFICE O/P EST MOD 30 MIN: CPT | Performed by: INTERNAL MEDICINE

## 2020-06-03 PROCEDURE — 93000 ELECTROCARDIOGRAM COMPLETE: CPT | Performed by: INTERNAL MEDICINE

## 2020-06-03 RX ORDER — CITALOPRAM 10 MG/1
1 TABLET ORAL DAILY
COMMUNITY
Start: 2020-04-10

## 2020-06-03 NOTE — PROGRESS NOTES
Encounter Date:06/03/2020  Last seen 10/1/2019      Patient ID: Joie Godinez is a 75 y.o. female.    Chief Complaint:    Palpitations  Tachycardia bradycardia syndrome  Loop recorder  Incomplete right bundle branch block     History of Present Illness  Rare palpitations.     Since I have last seen, the patient has been without any chest discomfort ,shortness of breath,, dizziness or syncope.  Denies having any headache ,abdominal pain ,nausea, vomiting , diarrhea constipation, loss of weight or loss of appetite.  Denies having any excessive bruising ,hematuria or blood in the stool.     Review of all systems negative except as indicated  Assessment and Plan         [[[[[[[[[[[[[[[[[    Impression  ================.  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 no significant problems since last visit.     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      - chest discomfort shortness of breath and jaw discomfort.  Possible angina pectoris.  Patient is better with isosorbide.     Cardiac catheterization  11/09/2015 revealed normal Coronary arteries and normal left ventricular function.    Echocardiogram showed mild left atrial enlargement and mild mitral regurgitation ( 10/22/2015 )      - status post cholecystectomy hysterectomy left hip screw  placement bladder sling surgery and left knee surgery .  History of hip surgery.     -allergy to Claritin Bactrim and IVP dye and Ultram  =================    Plan  =============  EKG showed sinus rhythm without any ischemic changes  patient is not having any angina pectoris or congestive heart failure  palpitations are well controlled  Medications were reviewed and updated.  Have discussed about leaving the up recorder in place for the time being and can be removed  when or patient wants it.  followup in the office in 6 months with loop recorder interrogation  Further plan will depend on patient's progress.  [[[[[[[[[[[               Diagnosis Plan   1. Palpitations  ECG 12 Lead   2. Tachycardia-bradycardia (CMS/HCC)  ECG 12 Lead   3. Incomplete right bundle branch block  ECG 12 Lead   4. History of loop recorder  ECG 12 Lead   LAB RESULTS (LAST 7 DAYS)    CBC        BMP        CMP         BNP        TROPONIN        CoAg        Creatinine Clearance  CrCl cannot be calculated (Patient's most recent lab result is older than the maximum 30 days allowed.).    ABG        Radiology  No radiology results for the last day                The following portions of the patient's history were reviewed and updated as appropriate: allergies, current medications, past family history, past medical history, past social history, past surgical history and problem list.    Review of Systems   Constitution: Negative for fever and malaise/fatigue.   HENT: Negative for congestion and hearing loss.    Eyes: Negative for double vision and visual disturbance.   Cardiovascular: Positive for dyspnea on exertion and palpitations. Negative for chest pain, claudication, leg swelling and syncope.   Respiratory: Negative for cough and shortness of breath.    Endocrine: Negative for cold intolerance.   Skin: Negative for color change and rash.   Musculoskeletal: Negative for arthritis and joint pain.   Gastrointestinal: Negative for abdominal pain and heartburn.   Genitourinary: Negative for hematuria.   Neurological: Negative for excessive daytime sleepiness and dizziness.   Psychiatric/Behavioral: Negative for depression. The patient is not nervous/anxious.    All other systems reviewed and are negative.        Current Outpatient Medications:   •  aspirin (PITO LOW DOSE) 81 MG EC tablet, PITO LOW DOSE 81 MG TBEC, Disp: , Rfl:   •  calcium carbonate (OS-RAVEN) 600 MG tablet, Take 600 mg by mouth Daily., Disp:  , Rfl:   •  cholecalciferol (VITAMIN D3) 1000 units tablet, Take 1,000 Units by mouth Daily., Disp: , Rfl:   •  citalopram (CeleXA) 10 MG tablet, 1 tablet Daily., Disp: , Rfl:   •  diphenhydrAMINE (BENADRYL) 25 mg capsule, Take 25 mg by mouth Every 6 (Six) Hours As Needed for Itching., Disp: , Rfl:   •  montelukast (SINGULAIR) 10 MG tablet, , Disp: , Rfl:   •  omeprazole (priLOSEC) 40 MG capsule, , Disp: , Rfl:   •  Polyvinyl Alcohol-Povidone PF (HYPOTEARS) 1.4-0.6 % ophthalmic solution, 1-2 drops As Needed for Wound Care., Disp: , Rfl:   •  propranolol LA (INDERAL LA) 120 MG 24 hr capsule, TAKE 1 CAPSULE EVERY MORNING, Disp: 90 capsule, Rfl: 3  •  ranolazine (RANEXA) 500 MG 12 hr tablet, , Disp: , Rfl:   •  RESTASIS 0.05 % ophthalmic emulsion, , Disp: , Rfl:   •  SUMAtriptan (IMITREX) 50 MG tablet, Take 50 mg by mouth 1 (One) Time As Needed., Disp: , Rfl:   •  Multiple Vitamins-Minerals (CENTRUM SILVER ADULT 50+) tablet, CENTRUM SILVER ADULT 50+ TABS, Disp: , Rfl:     Allergies   Allergen Reactions   • Contrast Dye Unknown (See Comments)   • Hydrocodone Unknown (See Comments)   • Oxycodone Unknown (See Comments)   • Sulfamethoxazole-Trimethoprim Hives   • Loratadine Other (See Comments)   • Tramadol Hcl Unknown (See Comments)       Family History   Problem Relation Age of Onset   • Hypertension Mother    • Asthma Mother        Past Surgical History:   Procedure Laterality Date   • CARDIAC CATHETERIZATION         Past Medical History:   Diagnosis Date   • Acid reflux    • Anemia    • Bradycardia    • Palpitations        Family History   Problem Relation Age of Onset   • Hypertension Mother    • Asthma Mother        Social History     Socioeconomic History   • Marital status:      Spouse name: Not on file   • Number of children: Not on file   • Years of education: Not on file   • Highest education level: Not on file   Tobacco Use   • Smoking status: Former Smoker   • Smokeless tobacco: Never Used   Substance  and Sexual Activity   • Alcohol use: Yes     Comment: wine with dinner   • Drug use: Never   • Sexual activity: Defer           ECG 12 Lead  Date/Time: 6/3/2020 2:49 PM  Performed by: Otis Patel MD  Authorized by: Otis Patel MD   Comparison: compared with previous ECG   Similar to previous ECG  Comments: Normal sinus rhythm nonspecific ST-T wave changes incomplete right bundle branch block normal axis normal intervals no ectopy no change from 10/1/2019              Objective:       Physical Exam    /72   Pulse 71   Wt 75.8 kg (167 lb)   SpO2 96%   BMI 27.79 kg/m²   The patient is alert, oriented and in no distress.    Vital signs as noted above.    Head and neck revealed no carotid bruits or jugular venous distension.  No thyromegaly or lymphadenopathy is present.    Lungs clear.  No wheezing.  Breath sounds are normal bilaterally.    Heart normal first and second heart sounds.  No murmur..  No pericardial rub is present.  No gallop is present.    Abdomen soft and nontender.  No organomegaly is present.    Extremities revealed good peripheral pulses without any pedal edema.    Skin warm and dry.    Musculoskeletal system is grossly normal.    CNS grossly normal.

## 2020-07-20 RX ORDER — PROPRANOLOL HYDROCHLORIDE 120 MG/1
CAPSULE, EXTENDED RELEASE ORAL
Qty: 90 CAPSULE | Refills: 1 | Status: SHIPPED | OUTPATIENT
Start: 2020-07-20 | End: 2021-01-15 | Stop reason: SDUPTHER

## 2020-10-21 ENCOUNTER — OFFICE (AMBULATORY)
Dept: URBAN - METROPOLITAN AREA CLINIC 64 | Facility: CLINIC | Age: 75
End: 2020-10-21

## 2020-10-21 VITALS
WEIGHT: 172 LBS | DIASTOLIC BLOOD PRESSURE: 44 MMHG | HEART RATE: 68 BPM | SYSTOLIC BLOOD PRESSURE: 120 MMHG | HEIGHT: 65 IN

## 2020-10-21 DIAGNOSIS — D50.0 IRON DEFICIENCY ANEMIA SECONDARY TO BLOOD LOSS (CHRONIC): ICD-10-CM

## 2020-10-21 DIAGNOSIS — K21.9 GASTRO-ESOPHAGEAL REFLUX DISEASE WITHOUT ESOPHAGITIS: ICD-10-CM

## 2020-10-21 DIAGNOSIS — K59.00 CONSTIPATION, UNSPECIFIED: ICD-10-CM

## 2020-10-21 PROCEDURE — 99213 OFFICE O/P EST LOW 20 MIN: CPT | Performed by: INTERNAL MEDICINE

## 2020-10-21 RX ORDER — ONDANSETRON 4 MG/1
16 TABLET, ORALLY DISINTEGRATING ORAL
Qty: 30 | Refills: 6 | Status: COMPLETED
Start: 2020-10-21 | End: 2023-01-04

## 2020-10-21 RX ORDER — OMEPRAZOLE 40 MG/1
40 CAPSULE, DELAYED RELEASE ORAL
Qty: 90 | Refills: 4 | Status: ACTIVE
Start: 2012-03-29

## 2020-12-10 ENCOUNTER — OFFICE VISIT (OUTPATIENT)
Dept: CARDIOLOGY | Facility: CLINIC | Age: 75
End: 2020-12-10

## 2020-12-10 VITALS
SYSTOLIC BLOOD PRESSURE: 122 MMHG | HEART RATE: 65 BPM | HEIGHT: 65 IN | WEIGHT: 177 LBS | BODY MASS INDEX: 29.49 KG/M2 | OXYGEN SATURATION: 98 % | DIASTOLIC BLOOD PRESSURE: 72 MMHG

## 2020-12-10 DIAGNOSIS — I49.5 TACHYCARDIA-BRADYCARDIA (HCC): Primary | ICD-10-CM

## 2020-12-10 DIAGNOSIS — I45.10 INCOMPLETE RIGHT BUNDLE BRANCH BLOCK: ICD-10-CM

## 2020-12-10 DIAGNOSIS — Z98.890 HISTORY OF LOOP RECORDER: ICD-10-CM

## 2020-12-10 DIAGNOSIS — R00.2 PALPITATIONS: ICD-10-CM

## 2020-12-10 PROCEDURE — 99214 OFFICE O/P EST MOD 30 MIN: CPT | Performed by: INTERNAL MEDICINE

## 2020-12-10 PROCEDURE — 93000 ELECTROCARDIOGRAM COMPLETE: CPT | Performed by: INTERNAL MEDICINE

## 2020-12-10 NOTE — PROGRESS NOTES
Encounter Date:12/10/2020  Last seen 6/3/2020      Patient ID: Joie Godinez is a 75 y.o. female.    Chief Complaint:    Palpitations  Tachycardia bradycardia syndrome  Loop recorder  Incomplete right bundle branch block     History of Present Illness  Patient had occasional palpitations.  Since I have last seen, the patient has been without any chest discomfort ,shortness of breath,, dizziness or syncope.  Denies having any headache ,abdominal pain ,nausea, vomiting , diarrhea constipation, loss of weight or loss of appetite.  Denies having any excessive bruising ,hematuria or blood in the stool.    Review of all systems negative except as indicated.    Reviewed ROS.    Assessment and Plan         [[[[[[[[[[[[[[[[[    Impression  ================.  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 no significant problems since last visit.     -status post loop recorder placement ([employer]) 05/13/2016     -incomplete right bundle-branch block and premature ventricular contractions.      - chest discomfort shortness of breath and jaw discomfort.  Possible angina pectoris.  Patient is better with isosorbide.     Cardiac catheterization  11/09/2015 revealed normal Coronary arteries and normal left ventricular function.    Echocardiogram showed mild left atrial enlargement and mild mitral regurgitation ( 10/22/2015 )      - status post cholecystectomy hysterectomy left hip screw  placement bladder sling surgery and left knee surgery .  History of hip surgery.     -allergy to Claritin Bactrim and IVP dye and Ultram  =================    Plan  =============  Patient has occasional palpitations.  EKG showed sinus rhythm without any ischemic changes  patient is not having any angina pectoris or congestive heart failure  Medications were reviewed and updated.  Have discussed about leaving the up recorder in place  for the time being and can be removed when or patient wants it.  followup in the office in 6 months with loop recorder interrogation  Further plan will depend on patient's progress.  [[[[[[[[[[[                    Diagnosis Plan   1. Tachycardia-bradycardia (CMS/HCC)  ECG 12 Lead   2. History of loop recorder  ECG 12 Lead   3. Palpitations     4. Incomplete right bundle branch block     LAB RESULTS (LAST 7 DAYS)    CBC        BMP        CMP         BNP        TROPONIN        CoAg        Creatinine Clearance  CrCl cannot be calculated (Patient's most recent lab result is older than the maximum 30 days allowed.).    ABG        Radiology  No radiology results for the last day                The following portions of the patient's history were reviewed and updated as appropriate: allergies, current medications, past family history, past medical history, past social history, past surgical history and problem list.    Review of Systems   Constitution: Negative for malaise/fatigue.   Cardiovascular: Positive for palpitations. Negative for chest pain, leg swelling and syncope.   Respiratory: Positive for shortness of breath.    Skin: Negative for rash.   Gastrointestinal: Negative for nausea and vomiting.   Neurological: Negative for dizziness, light-headedness and numbness.         Current Outpatient Medications:   •  aspirin (PITO LOW DOSE) 81 MG EC tablet, PITO LOW DOSE 81 MG TBEC, Disp: , Rfl:   •  calcium carbonate (OS-RAVEN) 600 MG tablet, Take 600 mg by mouth Daily., Disp: , Rfl:   •  cholecalciferol (VITAMIN D3) 1000 units tablet, Take 1,000 Units by mouth Daily., Disp: , Rfl:   •  citalopram (CeleXA) 10 MG tablet, 1 tablet Daily., Disp: , Rfl:   •  diphenhydrAMINE (BENADRYL) 25 mg capsule, Take 25 mg by mouth Every 6 (Six) Hours As Needed for Itching., Disp: , Rfl:   •  montelukast (SINGULAIR) 10 MG tablet, , Disp: , Rfl:   •  Multiple Vitamins-Minerals (CENTRUM SILVER ADULT 50+) tablet, CENTRUM SILVER ADULT 50+  TABS, Disp: , Rfl:   •  omeprazole (priLOSEC) 40 MG capsule, , Disp: , Rfl:   •  Polyvinyl Alcohol-Povidone PF (HYPOTEARS) 1.4-0.6 % ophthalmic solution, 1-2 drops As Needed for Wound Care., Disp: , Rfl:   •  propranolol LA (INDERAL LA) 120 MG 24 hr capsule, TAKE 1 CAPSULE EVERY MORNING, Disp: 90 capsule, Rfl: 1  •  ranolazine (RANEXA) 500 MG 12 hr tablet, , Disp: , Rfl:   •  RESTASIS 0.05 % ophthalmic emulsion, , Disp: , Rfl:     Allergies   Allergen Reactions   • Contrast Dye Unknown (See Comments)   • Hydrocodone Unknown (See Comments)   • Oxycodone Unknown (See Comments)   • Sulfamethoxazole-Trimethoprim Hives   • Loratadine Other (See Comments)   • Tramadol Hcl Unknown (See Comments)       Family History   Problem Relation Age of Onset   • Hypertension Mother    • Asthma Mother        Past Surgical History:   Procedure Laterality Date   • CARDIAC CATHETERIZATION         Past Medical History:   Diagnosis Date   • Acid reflux    • Anemia    • Bradycardia    • Palpitations        Family History   Problem Relation Age of Onset   • Hypertension Mother    • Asthma Mother        Social History     Socioeconomic History   • Marital status:      Spouse name: Not on file   • Number of children: Not on file   • Years of education: Not on file   • Highest education level: Not on file   Tobacco Use   • Smoking status: Former Smoker   • Smokeless tobacco: Never Used   Substance and Sexual Activity   • Alcohol use: Yes     Comment: wine with dinner   • Drug use: Never   • Sexual activity: Defer           ECG 12 Lead    Date/Time: 12/10/2020 1:50 PM  Performed by: Otis Patel MD  Authorized by: Otis Patel MD   Comparison: compared with previous ECG   Similar to previous ECG  Comparison to previous ECG: Normal sinus rhythm nonspecific ST-T wave changes 66/min normal axis normal intervals no ectopy no significant change from 6/3/2020                Objective:       Physical Exam    /72 (BP Location:  "Right arm, Patient Position: Sitting, Cuff Size: Large Adult)   Pulse 65   Ht 165.1 cm (65\")   Wt 80.3 kg (177 lb)   SpO2 98%   BMI 29.45 kg/m²   The patient is alert, oriented and in no distress.    Vital signs as noted above.    Head and neck revealed no carotid bruits or jugular venous distension.  No thyromegaly or lymphadenopathy is present.    Lungs clear.  No wheezing.  Breath sounds are normal bilaterally.    Heart normal first and second heart sounds.  No murmur..  No pericardial rub is present.  No gallop is present.    Abdomen soft and nontender.  No organomegaly is present.    Extremities revealed good peripheral pulses without any pedal edema.    Skin warm and dry.    Musculoskeletal system is grossly normal.    CNS grossly normal.    Reviewed and unchanged from last visit.        "

## 2021-01-15 RX ORDER — PROPRANOLOL HYDROCHLORIDE 120 MG/1
120 CAPSULE, EXTENDED RELEASE ORAL EVERY MORNING
Qty: 90 CAPSULE | Refills: 3 | Status: SHIPPED | OUTPATIENT
Start: 2021-01-15 | End: 2021-01-18 | Stop reason: SDUPTHER

## 2021-01-18 RX ORDER — PROPRANOLOL HYDROCHLORIDE 120 MG/1
120 CAPSULE, EXTENDED RELEASE ORAL EVERY MORNING
Qty: 90 CAPSULE | Refills: 3 | Status: SHIPPED | OUTPATIENT
Start: 2021-01-18 | End: 2021-12-20 | Stop reason: SDUPTHER

## 2021-06-14 ENCOUNTER — OFFICE VISIT (OUTPATIENT)
Dept: CARDIOLOGY | Facility: CLINIC | Age: 76
End: 2021-06-14

## 2021-06-14 VITALS
WEIGHT: 173 LBS | SYSTOLIC BLOOD PRESSURE: 136 MMHG | HEIGHT: 65 IN | BODY MASS INDEX: 28.82 KG/M2 | OXYGEN SATURATION: 95 % | HEART RATE: 59 BPM | DIASTOLIC BLOOD PRESSURE: 78 MMHG

## 2021-06-14 DIAGNOSIS — R00.2 PALPITATIONS: ICD-10-CM

## 2021-06-14 DIAGNOSIS — I45.10 INCOMPLETE RIGHT BUNDLE BRANCH BLOCK: ICD-10-CM

## 2021-06-14 DIAGNOSIS — I49.5 TACHYCARDIA-BRADYCARDIA (HCC): Primary | ICD-10-CM

## 2021-06-14 DIAGNOSIS — Z98.890 HISTORY OF LOOP RECORDER: ICD-10-CM

## 2021-06-14 PROCEDURE — 99214 OFFICE O/P EST MOD 30 MIN: CPT | Performed by: INTERNAL MEDICINE

## 2021-06-14 PROCEDURE — 93000 ELECTROCARDIOGRAM COMPLETE: CPT | Performed by: INTERNAL MEDICINE

## 2021-06-14 NOTE — PROGRESS NOTES
Encounter Date:06/14/2021  Last seen 12/10/2020      Patient ID: Joie Godinez is a 76 y.o. female.    Chief Complaint:  Palpitations  Tachycardia bradycardia syndrome  Loop recorder  Incomplete right bundle branch block     History of Present Illness  Since I have last seen, the patient has been without any chest discomfort ,shortness of breath, palpitations, dizziness or syncope.  Denies having any headache ,abdominal pain ,nausea, vomiting , diarrhea constipation, loss of weight or loss of appetite.  Denies having any excessive bruising ,hematuria or blood in the stool.    Review of all systems negative except as indicated.    Reviewed ROS.    Assessment and Plan         [[[[[[[[[[[[[[[[[    Impression  ================.  -palpitations and recent symptomatic bradycardia with heart rates of 30 per minute although no documentation was available.  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  No significant problems since last visit.     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      - chest discomfort shortness of breath and jaw discomfort.  Possible angina pectoris.  Patient is better with isosorbide.     Cardiac catheterization  11/09/2015 revealed normal Coronary arteries and normal left ventricular function.    Echocardiogram showed mild left atrial enlargement and mild mitral regurgitation ( 10/22/2015 )      - status post cholecystectomy hysterectomy left hip screw  placement bladder sling surgery and left knee surgery .  History of hip surgery.     -allergy to Claritin Bactrim and IVP dye and Ultram  =================    Plan  =============  Palpitations-improved.    Status post loop recorder-5/13/2016.  Battery depletion.    Tachybradycardia syndrome-improved    Chest discomfort and shortness of breath-improved.    EKG showed sinus rhythm without any ischemic changes  patient is not having any angina pectoris or  congestive heart failure.    Medications were reviewed and updated.  Have discussed about leaving the up recorder in place for the time being and can be removed when or patient wants it.  Followup in the office in 6 months.  Further plan will depend on patient's progress.  [[[[[[[[[[[                   Diagnosis Plan   1. Tachycardia-bradycardia (CMS/HCC)     2. History of loop recorder     3. Palpitations     4. Incomplete right bundle branch block     LAB RESULTS (LAST 7 DAYS)    CBC        BMP        CMP         BNP        TROPONIN        CoAg        Creatinine Clearance  CrCl cannot be calculated (Patient's most recent lab result is older than the maximum 30 days allowed.).    ABG        Radiology  No radiology results for the last day                The following portions of the patient's history were reviewed and updated as appropriate: allergies, current medications, past family history, past medical history, past social history, past surgical history and problem list.    Review of Systems   Constitutional: Negative for malaise/fatigue.   Cardiovascular: Negative for chest pain, leg swelling, palpitations and syncope.   Respiratory: Positive for shortness of breath.    Skin: Negative for rash.   Musculoskeletal: Positive for neck pain (moves into shoulders).   Gastrointestinal: Negative for nausea and vomiting.   Neurological: Negative for dizziness, light-headedness and numbness.         Current Outpatient Medications:   •  aspirin (PITO LOW DOSE) 81 MG EC tablet, PITO LOW DOSE 81 MG TBEC, Disp: , Rfl:   •  calcium carbonate (OS-RAVEN) 600 MG tablet, Take 600 mg by mouth Daily., Disp: , Rfl:   •  cholecalciferol (VITAMIN D3) 1000 units tablet, Take 1,000 Units by mouth Daily., Disp: , Rfl:   •  citalopram (CeleXA) 10 MG tablet, 1 tablet Daily., Disp: , Rfl:   •  diphenhydrAMINE (BENADRYL) 25 mg capsule, Take 25 mg by mouth Every 6 (Six) Hours As Needed for Itching., Disp: , Rfl:   •  montelukast (SINGULAIR)  10 MG tablet, , Disp: , Rfl:   •  Multiple Vitamins-Minerals (CENTRUM SILVER ADULT 50+) tablet, CENTRUM SILVER ADULT 50+ TABS, Disp: , Rfl:   •  omeprazole (priLOSEC) 40 MG capsule, , Disp: , Rfl:   •  Polyvinyl Alcohol-Povidone PF (HYPOTEARS) 1.4-0.6 % ophthalmic solution, 1-2 drops As Needed for Wound Care., Disp: , Rfl:   •  propranolol LA (INDERAL LA) 120 MG 24 hr capsule, Take 1 capsule by mouth Every Morning., Disp: 90 capsule, Rfl: 3  •  ranolazine (RANEXA) 500 MG 12 hr tablet, , Disp: , Rfl:   •  RESTASIS 0.05 % ophthalmic emulsion, , Disp: , Rfl:     Allergies   Allergen Reactions   • Contrast Dye Unknown (See Comments)   • Hydrocodone Unknown (See Comments)   • Oxycodone Unknown (See Comments)   • Sulfamethoxazole-Trimethoprim Hives   • Loratadine Other (See Comments)   • Tramadol Hcl Unknown (See Comments)       Family History   Problem Relation Age of Onset   • Hypertension Mother    • Asthma Mother        Past Surgical History:   Procedure Laterality Date   • CARDIAC CATHETERIZATION         Past Medical History:   Diagnosis Date   • Acid reflux    • Anemia    • Bradycardia    • Palpitations        Family History   Problem Relation Age of Onset   • Hypertension Mother    • Asthma Mother        Social History     Socioeconomic History   • Marital status:      Spouse name: Not on file   • Number of children: Not on file   • Years of education: Not on file   • Highest education level: Not on file   Tobacco Use   • Smoking status: Former Smoker   • Smokeless tobacco: Never Used   Vaping Use   • Vaping Use: Never used   Substance and Sexual Activity   • Alcohol use: Yes     Comment: wine with dinner   • Drug use: Never   • Sexual activity: Defer           ECG 12 Lead    Date/Time: 6/14/2021 1:48 PM  Performed by: Otis Patel MD  Authorized by: Otis Patel MD   Comparison: compared with previous ECG   Similar to previous ECG  Comparison to previous ECG: Sinus bradycardia nonspecific ST-T  "wave changes 58/min normal axis normal intervals no ectopy no change from 12/10/2020                Objective:       Physical Exam    /78   Pulse 59   Ht 165.1 cm (65\")   Wt 78.5 kg (173 lb)   SpO2 95%   BMI 28.79 kg/m²   The patient is alert, oriented and in no distress.    Vital signs as noted above.    Head and neck revealed no carotid bruits or jugular venous distension.  No thyromegaly or lymphadenopathy is present.    Lungs clear.  No wheezing.  Breath sounds are normal bilaterally.    Heart normal first and second heart sounds.  No murmur..  No pericardial rub is present.  No gallop is present.    Abdomen soft and nontender.  No organomegaly is present.    Extremities revealed good peripheral pulses without any pedal edema.    Skin warm and dry.  Loop recorder site looks normal.    Musculoskeletal system is grossly normal.    CNS grossly normal.        "

## 2021-09-07 ENCOUNTER — HOSPITAL ENCOUNTER (EMERGENCY)
Facility: HOSPITAL | Age: 76
Discharge: LEFT BEFORE DISCHARGE | End: 2021-09-07
Attending: EMERGENCY MEDICINE

## 2021-09-07 ENCOUNTER — APPOINTMENT (OUTPATIENT)
Dept: GENERAL RADIOLOGY | Facility: HOSPITAL | Age: 76
End: 2021-09-07

## 2021-09-07 ENCOUNTER — HOSPITAL ENCOUNTER (EMERGENCY)
Facility: HOSPITAL | Age: 76
Discharge: HOME | End: 2021-09-07
Admitting: EMERGENCY MEDICINE

## 2021-09-07 ENCOUNTER — APPOINTMENT (OUTPATIENT)
Dept: CT IMAGING | Facility: HOSPITAL | Age: 76
End: 2021-09-07

## 2021-09-07 VITALS
RESPIRATION RATE: 17 BRPM | WEIGHT: 171.74 LBS | DIASTOLIC BLOOD PRESSURE: 57 MMHG | OXYGEN SATURATION: 95 % | HEIGHT: 66 IN | TEMPERATURE: 98.6 F | BODY MASS INDEX: 27.6 KG/M2 | SYSTOLIC BLOOD PRESSURE: 139 MMHG | HEART RATE: 57 BPM

## 2021-09-07 VITALS
OXYGEN SATURATION: 97 % | WEIGHT: 173.72 LBS | SYSTOLIC BLOOD PRESSURE: 156 MMHG | HEIGHT: 66 IN | HEART RATE: 58 BPM | RESPIRATION RATE: 16 BRPM | DIASTOLIC BLOOD PRESSURE: 49 MMHG | TEMPERATURE: 98.5 F | BODY MASS INDEX: 27.92 KG/M2
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
## 2021-09-07 DIAGNOSIS — S09.90XA INJURY OF HEAD, INITIAL ENCOUNTER: ICD-10-CM

## 2021-09-07 DIAGNOSIS — R07.81 RIB PAIN ON RIGHT SIDE: ICD-10-CM

## 2021-09-07 DIAGNOSIS — M25.561 ACUTE PAIN OF RIGHT KNEE: ICD-10-CM

## 2021-09-07 PROCEDURE — 73560 X-RAY EXAM OF KNEE 1 OR 2: CPT

## 2021-09-07 PROCEDURE — 71101 X-RAY EXAM UNILAT RIBS/CHEST: CPT

## 2021-09-07 PROCEDURE — 70486 CT MAXILLOFACIAL W/O DYE: CPT

## 2021-09-07 PROCEDURE — 99282 EMERGENCY DEPT VISIT SF MDM: CPT

## 2021-09-07 PROCEDURE — 73502 X-RAY EXAM HIP UNI 2-3 VIEWS: CPT

## 2021-09-07 PROCEDURE — 90715 TDAP VACCINE 7 YRS/> IM: CPT | Performed by: PHYSICIAN ASSISTANT

## 2021-09-07 PROCEDURE — 25010000002 TDAP 5-2.5-18.5 LF-MCG/0.5 SUSPENSION: Performed by: PHYSICIAN ASSISTANT

## 2021-09-07 PROCEDURE — 90471 IMMUNIZATION ADMIN: CPT | Performed by: PHYSICIAN ASSISTANT

## 2021-09-07 PROCEDURE — 12001 RPR S/N/AX/GEN/TRNK 2.5CM/<: CPT | Performed by: PHYSICIAN ASSISTANT

## 2021-09-07 PROCEDURE — 72125 CT NECK SPINE W/O DYE: CPT

## 2021-09-07 PROCEDURE — 99211 OFF/OP EST MAY X REQ PHY/QHP: CPT | Performed by: EMERGENCY MEDICINE

## 2021-09-07 PROCEDURE — 70450 CT HEAD/BRAIN W/O DYE: CPT

## 2021-09-07 RX ADMIN — TETANUS TOXOID, REDUCED DIPHTHERIA TOXOID AND ACELLULAR PERTUSSIS VACCINE, ADSORBED 0.5 ML: 5; 2.5; 8; 8; 2.5 SUSPENSION INTRAMUSCULAR at 15:06

## 2021-09-07 NOTE — DISCHARGE INSTRUCTIONS
Please use Tylenol as needed for pain control.  Please follow-up with primary care provider in 1 week's time as needed.  Please come back to the ER if you have severe vomiting or have change in mental status as you will need reevaluation at that time.  Please keep abrasions covered and dry with Band-Aid and bacitracin or Neosporin as needed.

## 2021-09-07 NOTE — ED PROVIDER NOTES
Subjective     Patient is a 76-year-old female comes in complaining of mechanical fall earlier today just prior to arrival.  Patient states that she was walking on her driveway where there was an uneven surface and she tripped and fell onto her right side.  Patient states that she injured the right side of her head, right rib cage, right hip and right knee.  Patient also suffered a small laceration to the right temporal area as well as abrasion to right hand and right knee.  Patient otherwise denies recent illness, fever, chills, cough, chest pain, shortness of breath, nausea, vomiting, diarrhea, abdominal pain or urinary symptoms.  Patient states her pain is about a 4 out of 10 in the right side of her head but states that this is minimal and does not want any pain medication at this time.  Patient states the pain is nonradiating.  Patient states that nothing seems to make his pain better or worse.  Patient denies any lightheadedness, dizziness or syncopal episode, numbness or tingling or focal deficit.        Review of Systems   Constitutional: Negative for chills, fatigue and fever.   HENT: Negative for congestion, sore throat, tinnitus and trouble swallowing.    Eyes: Negative for photophobia, discharge and visual disturbance.   Respiratory: Negative for cough, shortness of breath and wheezing.    Cardiovascular: Negative for chest pain, palpitations and leg swelling.   Gastrointestinal: Negative for abdominal pain, diarrhea, nausea and vomiting.   Genitourinary: Negative for dysuria, flank pain, frequency and urgency.   Musculoskeletal: Negative for arthralgias and myalgias.        Right facial pain, right rib pain, right hip pain and right knee pain.   Skin: Negative for rash.   Neurological: Negative for dizziness, syncope, speech difficulty, weakness, light-headedness, numbness and headaches.   Psychiatric/Behavioral: Negative for confusion.       Past Medical History:   Diagnosis Date   • Acid reflux    •  Anemia    • Bradycardia    • Palpitations        Allergies   Allergen Reactions   • Contrast Dye Unknown (See Comments)   • Hydrocodone Unknown (See Comments)   • Oxycodone Unknown (See Comments)   • Sulfamethoxazole-Trimethoprim Hives   • Cyclobenzaprine Other (See Comments)     Off balance   • Loratadine Other (See Comments)   • Tramadol Hcl Unknown (See Comments)       Past Surgical History:   Procedure Laterality Date   • CARDIAC CATHETERIZATION         Family History   Problem Relation Age of Onset   • Hypertension Mother    • Asthma Mother        Social History     Socioeconomic History   • Marital status:      Spouse name: Not on file   • Number of children: Not on file   • Years of education: Not on file   • Highest education level: Not on file   Tobacco Use   • Smoking status: Former Smoker   • Smokeless tobacco: Never Used   Vaping Use   • Vaping Use: Never used   Substance and Sexual Activity   • Alcohol use: Yes     Comment: wine with dinner   • Drug use: Never   • Sexual activity: Defer           Objective   Physical Exam  Vitals and nursing note reviewed.   Constitutional:       General: She is not in acute distress.     Appearance: She is well-developed. She is not diaphoretic.   HENT:      Head: Normocephalic and atraumatic.      Right Ear: Tympanic membrane, ear canal and external ear normal.      Left Ear: Tympanic membrane, ear canal and external ear normal.      Ears:      Comments: No hemotympanum     Nose: Nose normal.      Mouth/Throat:      Pharynx: No oropharyngeal exudate.   Eyes:      Extraocular Movements: Extraocular movements intact.      Conjunctiva/sclera: Conjunctivae normal.      Pupils: Pupils are equal, round, and reactive to light.   Cardiovascular:      Rate and Rhythm: Normal rate and regular rhythm.      Heart sounds: Normal heart sounds.      Comments: S1, S2 audible.  Pulmonary:      Effort: Pulmonary effort is normal. No respiratory distress.      Breath sounds:  Normal breath sounds. No wheezing, rhonchi or rales.      Comments: On room air.  Abdominal:      General: Bowel sounds are normal. There is no distension.      Palpations: Abdomen is soft.      Tenderness: There is no abdominal tenderness.   Musculoskeletal:         General: No tenderness or deformity. Normal range of motion.      Cervical back: Normal range of motion.   Skin:     General: Skin is warm.      Capillary Refill: Capillary refill takes less than 2 seconds.      Findings: No erythema or rash.      Comments: Laceration of right temporal area just above her right eye.  This measures about 1 cm.  Patient has a somewhat avulsive abrasion of right palm that is superficial.  Patient also has an abrasion of right knee.  No discoloration or injuries to right chest or right side of abdomen or right hip.   Neurological:      General: No focal deficit present.      Mental Status: She is alert and oriented to person, place, and time.      Cranial Nerves: No cranial nerve deficit.      Sensory: No sensory deficit.      Motor: No weakness.   Psychiatric:         Mood and Affect: Mood normal.         Behavior: Behavior normal.         Laceration Repair    Date/Time: 9/7/2021 4:51 PM  Performed by: Bradford Luciano PA  Authorized by: Bradford Luciano PA     Consent:     Consent obtained:  Verbal    Consent given by:  Patient    Risks discussed:  Infection, poor cosmetic result, poor wound healing, pain and retained foreign body  Anesthesia (see MAR for exact dosages):     Anesthesia method:  None  Laceration details:     Location:  Scalp    Scalp location:  R temporal    Length (cm):  1  Repair type:     Repair type:  Simple  Pre-procedure details:     Preparation:  Patient was prepped and draped in usual sterile fashion and imaging obtained to evaluate for foreign bodies  Exploration:     Hemostasis achieved with:  Direct pressure    Wound exploration: wound explored through full range of motion and entire depth of wound probed  "and visualized      Wound extent: no areolar tissue violation noted, no fascia violation noted, no foreign bodies/material noted, no muscle damage noted, no nerve damage noted, no tendon damage noted, no underlying fracture noted and no vascular damage noted      Contaminated: no    Treatment:     Area cleansed with:  Saline    Amount of cleaning:  Standard  Skin repair:     Repair method:  Tissue adhesive  Approximation:     Approximation:  Close  Post-procedure details:     Dressing:  Open (no dressing)    Patient tolerance of procedure:  Tolerated well, no immediate complications               ED Course      /50   Pulse 56   Temp 97 °F (36.1 °C) (Temporal)   Resp 15   Ht 167.6 cm (66\")   Wt 77.9 kg (171 lb 11.8 oz)   SpO2 95%   Breastfeeding No   BMI 27.72 kg/m²   Labs Reviewed - No data to display  XR Ribs Right With PA Chest    Result Date: 9/7/2021  Negative chest and right RIBS  Electronically Signed By-Daniel Cárdenas MD On:9/7/2021 3:56 PM This report was finalized on [id number] by  Daniel Cárdenas MD.    XR Knee 1 or 2 View Right    Result Date: 9/7/2021   1. Osteopenia with chondrocalcinosis and changes of osteoarthritis. 2. Questionable small joint effusion  Electronically Signed By-Daniel Cárdenas MD On:9/7/2021 3:45 PM This report was finalized on [id number] by  Daniel Cárdenas MD.    CT Head Without Contrast    Result Date: 9/7/2021   1. Atrophy 2. Small shrunken right globe with calcifications. 3. Chronic right maxillary sinus disease. 4. No acute findings  Electronically Signed By-Daniel Cárdenas MD On:9/7/2021 3:49 PM This report was finalized on [id number] by  Daniel Cárdenas MD.    CT Cervical Spine Without Contrast    Result Date: 9/7/2021   1. No acute disease in the cervical spine. 2. Degenerative disc disease and arthritis and vertebral body hypertrophic spurring and mild subluxation of multiple levels in the cervical spine and mild scoliosis.  Electronically Signed By-Earnest Boss, " "MD On:9/7/2021 4:15 PM This report was finalized on [id number] by  Earnest Boss MD.    CT Facial Bones Without Contrast    Result Date: 9/7/2021   1. Right phthisis bulbi 2. Right maxillary and ethmoid sinus disease 3. No facial bone fractures identified  Electronically Signed By-Daniel Cárdenas MD On:9/7/2021 3:55 PM This report was finalized on [id number] by  Daniel Cárdenas MD.    XR Hip With or Without Pelvis 2 - 3 View Right    Result Date: 9/7/2021  Postop changes of bilateral total hip arthroplasty. The right hip has been replaced since 2016. No fractures or acute findings identified.  Electronically Signed By-Daniel Cárdenas MD On:9/7/2021 3:46 PM This report was finalized on [id number] by  Daniel Cárdenas MD.                                         MDM  Number of Diagnoses or Management Options     Amount and/or Complexity of Data Reviewed  Tests in the radiology section of CPT®: reviewed    Risk of Complications, Morbidity, and/or Mortality  Presenting problems: low  Diagnostic procedures: low  Management options: low    Patient Progress  Patient progress: stable       Chart review:    EKG:    Imaging:  See above  Labs:      Vitals:  /50   Pulse 56   Temp 97 °F (36.1 °C) (Temporal)   Resp 15   Ht 167.6 cm (66\")   Wt 77.9 kg (171 lb 11.8 oz)   SpO2 95%   Breastfeeding No   BMI 27.72 kg/m²     Medications given:    Medications   Tdap (BOOSTRIX) injection 0.5 mL (0.5 mL Intramuscular Given 9/7/21 1506)       Procedures:    MDM: Patient is a 76-year-old female comes in complaining of mechanical fall and subsequent injury to her right side of head, right ribs, right hip and right knee.  CT head, facial bones and cervical spine were unremarkable for acute findings.  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  X-ray of right knee, right ribs, or right hip unremarkable for " acute fractures.  Tdap was given.  Wound adhesive was obtained and applied to right temporal laceration that measured 1 cm after copious amounts of normal saline irrigation.  Patient was given strict return precautions with head injury and patient and family at bedside voiced understanding. See full discharge instructions for further details.  Results and plan discussed with patient and is agreeable with plan.    Final diagnoses:   Fall, initial encounter   Acute pain of right knee   Injury of head, initial encounter   Rib pain on right side       ED Disposition  ED Disposition     ED Disposition Condition Comment    Discharge Stable           [facility] EMERGENCY DEPARTMENT  [address] [facility] [address] [phone]  Go in 1 day  If symptoms worsen    Pedro Rivera MD  [address] [address] [phone]    Call in 1 week  As needed         Medication List      No changes were made to your prescriptions during this visit.          Bradford Luciano PA  09/07/21 1926

## 2021-09-07 NOTE — ED NOTES
Pt reports tripping over feet this morning. Pt reports she hit her head but denies LOC. Pt reports L knee pain and R rib pain. Pt presents with bruising and swelling with small lac by R eye. Pt has abrasion on the L knee and R hand. Pt reports she takes a baby aspirin.      Johanne Meyer RN  09/07/21 [id number]

## 2021-10-20 ENCOUNTER — OFFICE (AMBULATORY)
Dept: URBAN - METROPOLITAN AREA CLINIC 64 | Facility: CLINIC | Age: 76
End: 2021-10-20

## 2021-10-20 VITALS
HEIGHT: 65 IN | DIASTOLIC BLOOD PRESSURE: 56 MMHG | WEIGHT: 176 LBS | SYSTOLIC BLOOD PRESSURE: 102 MMHG | HEART RATE: 61 BPM

## 2021-10-20 DIAGNOSIS — D50.0 IRON DEFICIENCY ANEMIA SECONDARY TO BLOOD LOSS (CHRONIC): ICD-10-CM

## 2021-10-20 DIAGNOSIS — K21.9 GASTRO-ESOPHAGEAL REFLUX DISEASE WITHOUT ESOPHAGITIS: ICD-10-CM

## 2021-10-20 PROCEDURE — 99214 OFFICE O/P EST MOD 30 MIN: CPT | Performed by: INTERNAL MEDICINE

## 2021-10-20 RX ORDER — OMEPRAZOLE 40 MG/1
40 CAPSULE, DELAYED RELEASE ORAL
Qty: 90 | Refills: 3 | Status: ACTIVE
Start: 2021-10-20

## 2021-12-20 RX ORDER — PROPRANOLOL HYDROCHLORIDE 120 MG/1
120 CAPSULE, EXTENDED RELEASE ORAL EVERY MORNING
Qty: 90 CAPSULE | Refills: 3 | Status: SHIPPED | OUTPATIENT
Start: 2021-12-20 | End: 2023-01-25

## 2021-12-20 NOTE — TELEPHONE ENCOUNTER
Rx Refill Note  Requested Prescriptions     Pending Prescriptions Disp Refills   • propranolol LA (INDERAL LA) 120 MG 24 hr capsule 90 capsule 3     Sig: Take 1 capsule by mouth Every Morning.      Last office visit with prescribing clinician: 6/14/2021      Next office visit with prescribing clinician: 12/23/2021            Nica Beck MA  12/20/21, 16:24 EST

## 2021-12-23 ENCOUNTER — OFFICE VISIT (OUTPATIENT)
Dept: CARDIOLOGY | Facility: CLINIC | Age: 76
End: 2021-12-23

## 2021-12-23 VITALS
HEART RATE: 54 BPM | SYSTOLIC BLOOD PRESSURE: 134 MMHG | BODY MASS INDEX: 28.12 KG/M2 | OXYGEN SATURATION: 96 % | DIASTOLIC BLOOD PRESSURE: 81 MMHG | WEIGHT: 175 LBS | HEIGHT: 66 IN

## 2021-12-23 DIAGNOSIS — I45.10 INCOMPLETE RIGHT BUNDLE BRANCH BLOCK: ICD-10-CM

## 2021-12-23 DIAGNOSIS — R00.2 PALPITATIONS: ICD-10-CM

## 2021-12-23 DIAGNOSIS — Z98.890 HISTORY OF LOOP RECORDER: ICD-10-CM

## 2021-12-23 DIAGNOSIS — I49.5 TACHYCARDIA-BRADYCARDIA (HCC): Primary | ICD-10-CM

## 2021-12-23 PROCEDURE — 99214 OFFICE O/P EST MOD 30 MIN: CPT | Performed by: INTERNAL MEDICINE

## 2021-12-23 PROCEDURE — 93000 ELECTROCARDIOGRAM COMPLETE: CPT | Performed by: INTERNAL MEDICINE

## 2021-12-23 NOTE — PROGRESS NOTES
Encounter Date:12/23/2021  Last seen 6/14/2021      Patient ID: Joie Godinez is a 76 y.o. female.    Chief Complaint:    Palpitations  Tachycardia bradycardia syndrome  Loop recorder  Incomplete right bundle branch block     History of Present Illness  Occasional palpitations.  Since I have last seen, the patient has been without any chest discomfort ,shortness of breath,, dizziness or syncope.  Denies having any headache ,abdominal pain ,nausea, vomiting , diarrhea constipation, loss of weight or loss of appetite.  Denies having any excessive bruising ,hematuria or blood in the stool.    Review of all systems negative except as indicated.    Reviewed ROS.  Assessment and Plan         [[[[[[[[[[[[[[[[[    Impression  ================.  -palpitations and recent symptomatic bradycardia with heart rates of 30 per minute although no documentation was available.  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  No significant problems since last visit.     -status post loop recorder placement ([employer]) 05/13/2016     -incomplete right bundle-branch block and premature ventricular contractions.      - chest discomfort shortness of breath and jaw discomfort.  Possible angina pectoris.  Patient is better with isosorbide.     Cardiac catheterization  11/09/2015 revealed normal Coronary arteries and normal left ventricular function.    Echocardiogram showed mild left atrial enlargement and mild mitral regurgitation ( 10/22/2015 )      - status post cholecystectomy hysterectomy left hip screw  placement bladder sling surgery and left knee surgery .  History of hip surgery.     -allergy to Claritin Bactrim and IVP dye and Ultram  =================    Plan  =============  Palpitations-improved.  Occasional palpitations.  EKG showed sinus rhythm premature atrial contractions.     Status post loop recorder-5/13/2016.  Battery depletion.  Patient would like to libertine for now.     Tachybradycardia syndrome-improved     Chest discomfort  and shortness of breath-improved.      Medications were reviewed and updated.  Followup in the office in 6 months.  Further plan will depend on patient's progress.  [[[[[[[[[[[              Diagnosis Plan   1. Tachycardia-bradycardia (HCC)  ECG 12 Lead   2. History of loop recorder  ECG 12 Lead   3. Palpitations  ECG 12 Lead   4. Incomplete right bundle branch block  ECG 12 Lead   LAB RESULTS (LAST 7 DAYS)    CBC        BMP        CMP         BNP        TROPONIN        CoAg        Creatinine Clearance  CrCl cannot be calculated (Patient's most recent lab result is older than the maximum 30 days allowed.).    ABG        Radiology  No radiology results for the last day                The following portions of the patient's history were reviewed and updated as appropriate: allergies, current medications, past family history, past medical history, past social history, past surgical history and problem list.    Review of Systems   Constitutional: Negative for malaise/fatigue.   Cardiovascular: Positive for palpitations. Negative for chest pain, leg swelling (mild at ankles) and syncope.   Respiratory: Negative for shortness of breath.    Skin: Negative for rash.   Gastrointestinal: Negative for nausea and vomiting.   Neurological: Negative for dizziness, light-headedness and numbness.   All other systems reviewed and are negative.        Current Outpatient Medications:   •  aspirin (PITO LOW DOSE) 81 MG EC tablet, PITO LOW DOSE 81 MG TBEC, Disp: , Rfl:   •  calcium carbonate (OS-RAVEN) 600 MG tablet, Take 600 mg by mouth Daily., Disp: , Rfl:   •  cholecalciferol (VITAMIN D3) 1000 units tablet, Take 1,000 Units by mouth Daily., Disp: , Rfl:   •  citalopram (CeleXA) 10 MG tablet, 1 tablet Daily., Disp: , Rfl:   •  diphenhydrAMINE (BENADRYL) 25 mg capsule, Take 25 mg by mouth Every 6 (Six) Hours As Needed for Itching., Disp: , Rfl:   •  montelukast (SINGULAIR) 10 MG tablet, , Disp: , Rfl:   •  Multiple Vitamins-Minerals  (CENTRUM SILVER ADULT 50+) tablet, CENTRUM SILVER ADULT 50+ TABS, Disp: , Rfl:   •  omeprazole (priLOSEC) 40 MG capsule, , Disp: , Rfl:   •  Polyvinyl Alcohol-Povidone PF (HYPOTEARS) 1.4-0.6 % ophthalmic solution, 1-2 drops As Needed for Wound Care., Disp: , Rfl:   •  propranolol LA (INDERAL LA) 120 MG 24 hr capsule, Take 1 capsule by mouth Every Morning., Disp: 90 capsule, Rfl: 3  •  ranolazine (RANEXA) 500 MG 12 hr tablet, , Disp: , Rfl:   •  RESTASIS 0.05 % ophthalmic emulsion, , Disp: , Rfl:     Allergies   Allergen Reactions   • Contrast Dye Unknown (See Comments)   • Hydrocodone Unknown (See Comments)   • Oxycodone Unknown (See Comments)   • Sulfamethoxazole-Trimethoprim Hives   • Cyclobenzaprine Other (See Comments)     Off balance   • Loratadine Other (See Comments)   • Tramadol Hcl Unknown (See Comments)       Family History   Problem Relation Age of Onset   • Hypertension Mother    • Asthma Mother        Past Surgical History:   Procedure Laterality Date   • CARDIAC CATHETERIZATION         Past Medical History:   Diagnosis Date   • Acid reflux    • Anemia    • Bradycardia    • Palpitations        Family History   Problem Relation Age of Onset   • Hypertension Mother    • Asthma Mother        Social History     Socioeconomic History   • Marital status:    Tobacco Use   • Smoking status: Former Smoker   • Smokeless tobacco: Never Used   Vaping Use   • Vaping Use: Never used   Substance and Sexual Activity   • Alcohol use: Yes     Comment: wine with dinner   • Drug use: Never   • Sexual activity: Defer           ECG 12 Lead    Date/Time: 12/23/2021 2:19 PM  Performed by: Otis Patel MD  Authorized by: Otis Patel MD   Comparison: compared with previous ECG   Similar to previous ECG  Comparison to previous ECG: Normal sinus rhythm premature atrial contractions nonspecific ST-T wave changes normal axis normal intervals no significant change from 6/14/2020                Objective:  "      Physical Exam    /81 (BP Location: Left arm, Patient Position: Sitting, Cuff Size: Large Adult)   Pulse 54   Ht 167.6 cm (66\")   Wt 79.4 kg (175 lb)   SpO2 96%   BMI 28.25 kg/m²   The patient is alert, oriented and in no distress.    Vital signs as noted above.    Head and neck revealed no carotid bruits or jugular venous distension.  No thyromegaly or lymphadenopathy is present.    Lungs clear.  No wheezing.  Breath sounds are normal bilaterally.    Heart normal first and second heart sounds.  No murmur..  No pericardial rub is present.  No gallop is present.    Abdomen soft and nontender.  No organomegaly is present.    Extremities revealed good peripheral pulses without any pedal edema.    Skin warm and dry.    Musculoskeletal system is grossly normal.    CNS grossly normal.    Reviewed and unchanged from last visit.        "

## 2022-06-15 ENCOUNTER — OFFICE VISIT (OUTPATIENT)
Dept: CARDIOLOGY | Facility: CLINIC | Age: 77
End: 2022-06-15

## 2022-06-15 VITALS
BODY MASS INDEX: 28.28 KG/M2 | HEIGHT: 66 IN | SYSTOLIC BLOOD PRESSURE: 152 MMHG | DIASTOLIC BLOOD PRESSURE: 47 MMHG | OXYGEN SATURATION: 97 % | HEART RATE: 63 BPM | WEIGHT: 176 LBS

## 2022-06-15 DIAGNOSIS — R07.89 CHEST DISCOMFORT: Primary | ICD-10-CM

## 2022-06-15 DIAGNOSIS — I49.5 TACHYCARDIA-BRADYCARDIA: ICD-10-CM

## 2022-06-15 DIAGNOSIS — Z98.890 HISTORY OF LOOP RECORDER: ICD-10-CM

## 2022-06-15 DIAGNOSIS — I10 ESSENTIAL HYPERTENSION: ICD-10-CM

## 2022-06-15 DIAGNOSIS — R00.2 PALPITATIONS: ICD-10-CM

## 2022-06-15 DIAGNOSIS — R06.02 SHORTNESS OF BREATH: ICD-10-CM

## 2022-06-15 PROCEDURE — 93000 ELECTROCARDIOGRAM COMPLETE: CPT | Performed by: INTERNAL MEDICINE

## 2022-06-15 PROCEDURE — 99214 OFFICE O/P EST MOD 30 MIN: CPT | Performed by: INTERNAL MEDICINE

## 2022-06-15 NOTE — PROGRESS NOTES
Encounter Date:06/15/2022  Last seen 12/23/2021      Patient ID: Joie Godinez is a 77 y.o. female.    Chief Complaint:    Palpitations  Tachycardia bradycardia syndrome  Loop recorder  Incomplete right bundle branch block     History of Present Illness  Lately patient has been having exertional shortness of breath and chest discomfort suggestive of angina pectoris.  Patient is having palpitations.    Since I have last seen, the patient has been without any dizziness or syncope.  Denies having any headache ,abdominal pain ,nausea, vomiting , diarrhea constipation, loss of weight or loss of appetite.  Denies having any excessive bruising ,hematuria or blood in the stool.    Review of all systems negative except as indicated.    Reviewed ROS.    Assessment and Plan         [[[[[[[[[[[[[[[[[    Impression  ================.  - Exertional shortness of breath chest discomfort and palpitations.    -History of palpitations and symptomatic bradycardia with heart rates of 30 per minute although no documentation was available.  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  No significant problems since last visit.     -status post loop recorder placement ([employer]) 05/13/2016  Battery depletion.  Patient has decided to leave loop recorder in place for now.     -incomplete right bundle-branch block and premature ventricular contractions.      - chest discomfort shortness of breath and jaw discomfort.  Possible angina pectoris.  Patient is better with isosorbide.     Cardiac catheterization  11/09/2015 revealed normal Coronary arteries and normal left ventricular function.    Echocardiogram showed mild left atrial enlargement and mild mitral regurgitation ( 10/22/2015 )      - status post cholecystectomy hysterectomy left hip screw  placement bladder sling surgery and left knee surgery .  History of hip surgery.     -allergy to Claritin Bactrim and IVP dye and Ultram  =================    Plan  =============  Exertional shortness of  breath chest discomfort and palpitations.  EKG showed sinus rhythm incomplete right bundle branch block  Stress Cardiolite test  Echocardiogram.    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    Status post loop recorder-5/13/2016.  Battery depletion.    Patient wants to leave the loop recorder in place for now.     Medications were reviewed and updated.    Followup in the office on the same day.    Further plan will depend on patient's progress.  [[[[[[[[[[[                   Diagnosis Plan   1. Chest discomfort  ECG 12 Lead    Adult Transthoracic Echo Complete W/ Cont if Necessary Per Protocol    Stress Test With Myocardial Perfusion One Day   2. Tachycardia-bradycardia (HCC)  ECG 12 Lead    Adult Transthoracic Echo Complete W/ Cont if Necessary Per Protocol    Stress Test With Myocardial Perfusion One Day   3. History of loop recorder  ECG 12 Lead    Adult Transthoracic Echo Complete W/ Cont if Necessary Per Protocol    Stress Test With Myocardial Perfusion One Day   4. Palpitations  ECG 12 Lead    Adult Transthoracic Echo Complete W/ Cont if Necessary Per Protocol    Stress Test With Myocardial Perfusion One Day   5. Essential hypertension  ECG 12 Lead    Adult Transthoracic Echo Complete W/ Cont if Necessary Per Protocol    Stress Test With Myocardial Perfusion One Day   6. Shortness of breath  Adult Transthoracic Echo Complete W/ Cont if Necessary Per Protocol    Stress Test With Myocardial Perfusion One Day   LAB RESULTS (LAST 7 DAYS)    CBC        BMP        CMP         BNP        TROPONIN        CoAg        Creatinine Clearance  CrCl cannot be calculated (Patient's most recent lab result is older than the maximum 30 days allowed.).    ABG        Radiology  No radiology results for the last day                The following portions of the patient's history were reviewed and updated as appropriate: allergies, current medications, past family history, past medical history, past social history, past surgical  "history and problem list.    Review of Systems   Constitutional: Positive for malaise/fatigue.   Cardiovascular: Positive for chest pain (\"Mild shooting pain\") and palpitations. Negative for leg swelling and syncope.   Respiratory: Positive for shortness of breath.    Skin: Negative for rash.   Gastrointestinal: Negative for nausea and vomiting.   Neurological: Positive for dizziness. Negative for light-headedness and numbness.   All other systems reviewed and are negative.        Current Outpatient Medications:   •  aspirin (aspirin) 81 MG EC tablet, PITO LOW DOSE 81 MG TBEC, Disp: , Rfl:   •  calcium carbonate (OS-RAVEN) 600 MG tablet, Take 600 mg by mouth Daily., Disp: , Rfl:   •  cholecalciferol (VITAMIN D3) 1000 units tablet, Take 1,000 Units by mouth Daily., Disp: , Rfl:   •  citalopram (CeleXA) 10 MG tablet, 1 tablet Daily., Disp: , Rfl:   •  diphenhydrAMINE (BENADRYL) 25 mg capsule, Take 25 mg by mouth Every 6 (Six) Hours As Needed for Itching., Disp: , Rfl:   •  montelukast (SINGULAIR) 10 MG tablet, , Disp: , Rfl:   •  Multiple Vitamins-Minerals (CENTRUM SILVER ADULT 50+) tablet, CENTRUM SILVER ADULT 50+ TABS, Disp: , Rfl:   •  omeprazole (priLOSEC) 40 MG capsule, , Disp: , Rfl:   •  Polyvinyl Alcohol-Povidone PF (HYPOTEARS) 1.4-0.6 % ophthalmic solution, 1-2 drops As Needed for Wound Care., Disp: , Rfl:   •  propranolol LA (INDERAL LA) 120 MG 24 hr capsule, Take 1 capsule by mouth Every Morning., Disp: 90 capsule, Rfl: 3  •  ranolazine (RANEXA) 500 MG 12 hr tablet, Take 500 mg by mouth 2 (Two) Times a Day., Disp: , Rfl:   •  RESTASIS 0.05 % ophthalmic emulsion, , Disp: , Rfl:     Allergies   Allergen Reactions   • Contrast Dye Unknown (See Comments)   • Hydrocodone Unknown (See Comments)   • Oxycodone Unknown (See Comments)   • Sulfamethoxazole-Trimethoprim Hives   • Cyclobenzaprine Other (See Comments)     Off balance   • Loratadine Other (See Comments)   • Tramadol Hcl Unknown (See Comments)       Family " "History   Problem Relation Age of Onset   • Hypertension Mother    • Asthma Mother        Past Surgical History:   Procedure Laterality Date   • CARDIAC CATHETERIZATION         Past Medical History:   Diagnosis Date   • Acid reflux    • Anemia    • Bradycardia    • Palpitations        Family History   Problem Relation Age of Onset   • Hypertension Mother    • Asthma Mother        Social History     Socioeconomic History   • Marital status:    Tobacco Use   • Smoking status: Former Smoker   • Smokeless tobacco: Never Used   Vaping Use   • Vaping Use: Never used   Substance and Sexual Activity   • Alcohol use: Yes     Comment: wine with dinner   • Drug use: Never   • Sexual activity: Defer           ECG 12 Lead    Date/Time: 6/15/2022 1:17 PM  Performed by: Otis Patel MD  Authorized by: Otis Patel MD   Comparison: compared with previous ECG   Similar to previous ECG  Comparison to previous ECG: Normal sinus rhythm incomplete right bundle branch block 60/min normal axis normal intervals no ectopy no significant change from 12/23/2021                Objective:       Physical Exam    /47 (BP Location: Left arm, Patient Position: Sitting, Cuff Size: Large Adult)   Pulse 63   Ht 167.6 cm (66\")   Wt 79.8 kg (176 lb)   SpO2 97%   BMI 28.41 kg/m²   The patient is alert, oriented and in no distress.    Vital signs as noted above.    Head and neck revealed no carotid bruits or jugular venous distension.  No thyromegaly or lymphadenopathy is present.    Lungs clear.  No wheezing.  Breath sounds are normal bilaterally.    Heart normal first and second heart sounds.  No murmur..  No pericardial rub is present.  No gallop is present.    Abdomen soft and nontender.  No organomegaly is present.    Extremities revealed good peripheral pulses without any pedal edema.    Skin warm and dry.    Musculoskeletal system is grossly normal.    CNS grossly normal.    Reviewed and updated.        "

## 2022-06-20 ENCOUNTER — TELEPHONE (OUTPATIENT)
Dept: CARDIOLOGY | Facility: CLINIC | Age: 77
End: 2022-06-20

## 2022-07-07 ENCOUNTER — HOSPITAL ENCOUNTER (OUTPATIENT)
Dept: CARDIOLOGY | Facility: HOSPITAL | Age: 77
Discharge: HOME | End: 2022-07-07

## 2022-07-07 ENCOUNTER — OFFICE VISIT (OUTPATIENT)
Dept: CARDIOLOGY | Facility: CLINIC | Age: 77
End: 2022-07-07

## 2022-07-07 VITALS — HEIGHT: 66 IN | WEIGHT: 176 LBS | BODY MASS INDEX: 28.28 KG/M2

## 2022-07-07 DIAGNOSIS — I49.5 TACHYCARDIA-BRADYCARDIA: ICD-10-CM

## 2022-07-07 DIAGNOSIS — I10 ESSENTIAL HYPERTENSION: ICD-10-CM

## 2022-07-07 DIAGNOSIS — Z98.890 HISTORY OF LOOP RECORDER: ICD-10-CM

## 2022-07-07 DIAGNOSIS — R06.02 SHORTNESS OF BREATH: ICD-10-CM

## 2022-07-07 DIAGNOSIS — I45.10 INCOMPLETE RIGHT BUNDLE BRANCH BLOCK: ICD-10-CM

## 2022-07-07 DIAGNOSIS — R00.2 PALPITATIONS: ICD-10-CM

## 2022-07-07 DIAGNOSIS — R07.89 CHEST DISCOMFORT: ICD-10-CM

## 2022-07-07 DIAGNOSIS — I49.5 TACHYCARDIA-BRADYCARDIA: Primary | ICD-10-CM

## 2022-07-07 LAB
BH CV ECHO MEAS - ACS: 2.39 CM
BH CV ECHO MEAS - AI P1/2T: 952.3 MSEC
BH CV ECHO MEAS - AO MAX PG: 8.8 MMHG
BH CV ECHO MEAS - AO MEAN PG: 4.7 MMHG
BH CV ECHO MEAS - AO ROOT DIAM: 3.4 CM
BH CV ECHO MEAS - AO V2 MAX: 148.7 CM/SEC
BH CV ECHO MEAS - AO V2 VTI: 32.1 CM
BH CV ECHO MEAS - AVA(I,D): 1.96 CM2
BH CV ECHO MEAS - EDV(CUBED): 79.7 ML
BH CV ECHO MEAS - EDV(MOD-SP4): 83.9 ML
BH CV ECHO MEAS - EF(MOD-SP4): 68.5 %
BH CV ECHO MEAS - ESV(CUBED): 33.3 ML
BH CV ECHO MEAS - ESV(MOD-SP4): 26.4 ML
BH CV ECHO MEAS - FS: 25.2 %
BH CV ECHO MEAS - IVS/LVPW: 1.32 CM
BH CV ECHO MEAS - IVSD: 1.12 CM
BH CV ECHO MEAS - LA DIMENSION: 4.3 CM
BH CV ECHO MEAS - LV DIASTOLIC VOL/BSA (35-75): 44.9 CM2
BH CV ECHO MEAS - LV MASS(C)D: 139.9 GRAMS
BH CV ECHO MEAS - LV MAX PG: 2.41 MMHG
BH CV ECHO MEAS - LV MEAN PG: 1.13 MMHG
BH CV ECHO MEAS - LV SYSTOLIC VOL/BSA (12-30): 14.1 CM2
BH CV ECHO MEAS - LV V1 MAX: 77.5 CM/SEC
BH CV ECHO MEAS - LV V1 VTI: 14.6 CM
BH CV ECHO MEAS - LVIDD: 4.3 CM
BH CV ECHO MEAS - LVIDS: 3.2 CM
BH CV ECHO MEAS - LVOT AREA: 4.3 CM2
BH CV ECHO MEAS - LVOT DIAM: 2.34 CM
BH CV ECHO MEAS - LVPWD: 0.85 CM
BH CV ECHO MEAS - MV A MAX VEL: 65.9 CM/SEC
BH CV ECHO MEAS - MV DEC SLOPE: 475.3 CM/SEC2
BH CV ECHO MEAS - MV DEC TIME: 0.22 MSEC
BH CV ECHO MEAS - MV E MAX VEL: 102.5 CM/SEC
BH CV ECHO MEAS - MV E/A: 1.56
BH CV ECHO MEAS - MV MAX PG: 4 MMHG
BH CV ECHO MEAS - MV MEAN PG: 1.28 MMHG
BH CV ECHO MEAS - MV V2 VTI: 40.8 CM
BH CV ECHO MEAS - MVA(VTI): 1.55 CM2
BH CV ECHO MEAS - PA V2 MAX: 74.9 CM/SEC
BH CV ECHO MEAS - PI END-D VEL: 126.1 CM/SEC
BH CV ECHO MEAS - PULM A REVS DUR: 0.04 SEC
BH CV ECHO MEAS - PULM A REVS VEL: 31.4 CM/SEC
BH CV ECHO MEAS - PULM DIAS VEL: 71.9 CM/SEC
BH CV ECHO MEAS - PULM S/D: 0.72
BH CV ECHO MEAS - PULM SYS VEL: 51.9 CM/SEC
BH CV ECHO MEAS - RAP SYSTOLE: 3 MMHG
BH CV ECHO MEAS - RVDD: 3 CM
BH CV ECHO MEAS - RVSP: 32.7 MMHG
BH CV ECHO MEAS - SI(MOD-SP4): 30.7 ML/M2
BH CV ECHO MEAS - SV(LVOT): 63.1 ML
BH CV ECHO MEAS - SV(MOD-SP4): 57.4 ML
BH CV ECHO MEAS - TR MAX PG: 29.7 MMHG
BH CV ECHO MEAS - TR MAX VEL: 271.6 CM/SEC
BH CV REST NUCLEAR ISOTOPE DOSE: 9.8 MCI
BH CV STRESS COMMENTS STAGE 1: NORMAL
BH CV STRESS DOSE REGADENOSON STAGE 1: 0.4
BH CV STRESS DURATION MIN STAGE 1: 0
BH CV STRESS DURATION SEC STAGE 1: 10
BH CV STRESS NUCLEAR ISOTOPE DOSE: 32.8 MCI
BH CV STRESS PROTOCOL 1: NORMAL
BH CV STRESS RECOVERY BP: NORMAL MMHG
BH CV STRESS RECOVERY HR: 68 BPM
BH CV STRESS STAGE 1: 1
LV EF 2D ECHO EST: 60 %
MAXIMAL PREDICTED HEART RATE: 141 BPM
MAXIMAL PREDICTED HEART RATE: 141 BPM
STRESS BASELINE BP: NORMAL MMHG
STRESS BASELINE HR: 66 BPM
STRESS TARGET HR: 120 BPM
STRESS TARGET HR: 120 BPM

## 2022-07-07 PROCEDURE — 0 TECHNETIUM SESTAMIBI: Performed by: INTERNAL MEDICINE

## 2022-07-07 PROCEDURE — 93306 TTE W/DOPPLER COMPLETE: CPT

## 2022-07-07 PROCEDURE — 25010000002 REGADENOSON 0.4 MG/5ML SOLUTION: Performed by: INTERNAL MEDICINE

## 2022-07-07 PROCEDURE — 78452 HT MUSCLE IMAGE SPECT MULT: CPT

## 2022-07-07 PROCEDURE — A9500 TC99M SESTAMIBI: HCPCS | Performed by: INTERNAL MEDICINE

## 2022-07-07 PROCEDURE — 93018 CV STRESS TEST I&R ONLY: CPT | Performed by: INTERNAL MEDICINE

## 2022-07-07 PROCEDURE — 93017 CV STRESS TEST TRACING ONLY: CPT

## 2022-07-07 PROCEDURE — 93306 TTE W/DOPPLER COMPLETE: CPT | Performed by: INTERNAL MEDICINE

## 2022-07-07 PROCEDURE — 78452 HT MUSCLE IMAGE SPECT MULT: CPT | Performed by: INTERNAL MEDICINE

## 2022-07-07 PROCEDURE — 99213 OFFICE O/P EST LOW 20 MIN: CPT | Performed by: INTERNAL MEDICINE

## 2022-07-07 RX ORDER — SUMATRIPTAN 50 MG/1
1 TABLET, FILM COATED ORAL AS NEEDED
COMMUNITY
Start: 2022-06-26

## 2022-07-07 RX ADMIN — TECHNETIUM TC 99M SESTAMIBI 1 DOSE: 1 INJECTION INTRAVENOUS at 13:14

## 2022-07-07 RX ADMIN — REGADENOSON 0.4 MG: 0.08 INJECTION, SOLUTION INTRAVENOUS at 13:49

## 2022-07-07 RX ADMIN — TECHNETIUM TC 99M SESTAMIBI 1 DOSE: 1 INJECTION INTRAVENOUS at 13:49

## 2022-07-07 NOTE — PROGRESS NOTES
Encounter Date:07/07/2022  Last seen 6/15/2022      Patient ID: Joie Godinez is a 77 y.o. female.    Chief Complaint:  Palpitations  Tachycardia bradycardia syndrome  Loop recorder  Incomplete right bundle branch block     History of Present Illness  Patient recently was seen with following history.  Reviewed and updated.    Lately patient has been having exertional shortness of breath and chest discomfort suggestive of angina pectoris.  Patient is having palpitations.     Since I have last seen, the patient has been without any dizziness or syncope.  Denies having any headache ,abdominal pain ,nausea, vomiting , diarrhea constipation, loss of weight or loss of appetite.  Denies having any excessive bruising ,hematuria or blood in the stool.     Review of all systems negative except as indicated.     Reviewed ROS.     Assessment and Plan         [[[[[[[[[[[[[[[[[    Impression  ================.  - Exertional shortness of breath chest discomfort and palpitations.  Echocardiogram-normal except for mild mitral regurgitation- 7/7/2022  Lexiscan Cardiolite test-mild anterior ischemia     -History of palpitations and symptomatic bradycardia with heart rates of 30 per minute although no documentation was available.  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  No significant problems since last visit.     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  Patient has decided to leave loop recorder in place for now.     -incomplete right bundle-branch block and premature ventricular contractions.      - chest discomfort shortness of breath and jaw discomfort.  Possible angina pectoris.  Patient is better with isosorbide.     Cardiac catheterization  11/09/2015 revealed normal Coronary arteries and normal left ventricular function.    Echocardiogram showed mild left atrial enlargement and mild mitral regurgitation ( 10/22/2015 )      - status post cholecystectomy hysterectomy left hip screw   placement bladder sling surgery and left knee surgery .  History of hip surgery.     -allergy to Claritin Bactrim and IVP dye and Ultram  =================    Plan  =============  Exertional shortness of breath chest discomfort and palpitations.  EKG showed sinus rhythm incomplete right bundle branch block  Stress Cardiolite test-mild anterior ischemia  Echocardiogram.-  Mild mitral regurgitation.     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     Status post loop recorder-5/13/2016.  Battery depletion.    Patient wants to leave the loop recorder in place for now.     Medications were reviewed and updated.     Followup in the office in 6 weeks.  Consideration for cardiac catheterization if patient has continued symptoms.     Further plan will depend on patient's progress.  [[[[[[[[[[[                  Diagnosis Plan   1. Tachycardia-bradycardia (HCC)     2. History of loop recorder     3. Palpitations     4. Shortness of breath     5. Chest discomfort     6. Essential hypertension     7. Incomplete right bundle branch block     LAB RESULTS (LAST 7 DAYS)    CBC        BMP        CMP         BNP        TROPONIN        CoAg        Creatinine Clearance  CrCl cannot be calculated (Patient's most recent lab result is older than the maximum 30 days allowed.).    ABG        Radiology  No radiology results for the last day                The following portions of the patient's history were reviewed and updated as appropriate: allergies, current medications, past family history, past medical history, past social history, past surgical history and problem list.    Review of Systems   Constitutional: Negative for chills, fever and malaise/fatigue.   Cardiovascular: Negative for chest pain, dyspnea on exertion, leg swelling, palpitations and syncope.   Respiratory: Negative for shortness of breath.    Skin: Negative for rash.   Neurological: Negative for dizziness, light-headedness and numbness.         Current Outpatient  Medications:   •  aspirin (aspirin) 81 MG EC tablet, PITO LOW DOSE 81 MG TBEC, Disp: , Rfl:   •  calcium carbonate (OS-RAVEN) 600 MG tablet, Take 600 mg by mouth Daily., Disp: , Rfl:   •  cholecalciferol (VITAMIN D3) 1000 units tablet, Take 1,000 Units by mouth Daily., Disp: , Rfl:   •  citalopram (CeleXA) 10 MG tablet, 1 tablet Daily., Disp: , Rfl:   •  diphenhydrAMINE (BENADRYL) 25 mg capsule, Take 25 mg by mouth Every 6 (Six) Hours As Needed for Itching., Disp: , Rfl:   •  montelukast (SINGULAIR) 10 MG tablet, , Disp: , Rfl:   •  Multiple Vitamins-Minerals (CENTRUM SILVER ADULT 50+) tablet, CENTRUM SILVER ADULT 50+ TABS, Disp: , Rfl:   •  omeprazole (priLOSEC) 40 MG capsule, , Disp: , Rfl:   •  Polyvinyl Alcohol-Povidone PF (HYPOTEARS) 1.4-0.6 % ophthalmic solution, 1-2 drops As Needed for Wound Care., Disp: , Rfl:   •  propranolol LA (INDERAL LA) 120 MG 24 hr capsule, Take 1 capsule by mouth Every Morning., Disp: 90 capsule, Rfl: 3  •  ranolazine (RANEXA) 500 MG 12 hr tablet, Take 500 mg by mouth 2 (Two) Times a Day., Disp: , Rfl:   •  RESTASIS 0.05 % ophthalmic emulsion, , Disp: , Rfl:   •  SUMAtriptan (IMITREX) 50 MG tablet, Take 1 tablet by mouth Daily., Disp: , Rfl:   No current facility-administered medications for this visit.    Allergies   Allergen Reactions   • Contrast Dye Unknown (See Comments)   • Hydrocodone Unknown (See Comments)   • Oxycodone Unknown (See Comments)   • Sulfamethoxazole-Trimethoprim Hives   • Cyclobenzaprine Other (See Comments)     Off balance   • Loratadine Other (See Comments)   • Tramadol Hcl Unknown (See Comments)       Family History   Problem Relation Age of Onset   • Hypertension Mother    • Asthma Mother        Past Surgical History:   Procedure Laterality Date   • CARDIAC CATHETERIZATION         Past Medical History:   Diagnosis Date   • Acid reflux    • Anemia    • Bradycardia    • Palpitations        Family History   Problem Relation Age of Onset   • Hypertension Mother     • Asthma Mother        Social History     Socioeconomic History   • Marital status:    Tobacco Use   • Smoking status: Former Smoker   • Smokeless tobacco: Never Used   Vaping Use   • Vaping Use: Never used   Substance and Sexual Activity   • Alcohol use: Yes     Comment: wine with dinner   • Drug use: Never   • Sexual activity: Defer         Procedures      Objective:       Physical Exam    There were no vitals taken for this visit.  The patient is alert, oriented and in no distress.    Vital signs as noted above.    Head and neck revealed no carotid bruits or jugular venous distension.  No thyromegaly or lymphadenopathy is present.    Lungs clear.  No wheezing.  Breath sounds are normal bilaterally.    Heart normal first and second heart sounds.  No murmur..  No pericardial rub is present.  No gallop is present.    Abdomen soft and nontender.  No organomegaly is present.    Extremities revealed good peripheral pulses without any pedal edema.    Skin warm and dry.    Musculoskeletal system is grossly normal.    CNS grossly normal.    Reviewed and updated.

## 2022-08-24 NOTE — PROGRESS NOTES
Encounter Date:08/25/2022    Last seen-7/7/2022      Patient ID: Joie Godinez is a 77 y.o. female.    Chief Complaint:  Palpitations  Tachycardia bradycardia syndrome  Loop recorder  Incomplete right bundle branch block     History of Present Illness  Patient recently was seen with following history.  Reviewed and updated.  Patient has continued symptoms.     Lately patient has been having exertional shortness of breath and chest discomfort suggestive of angina pectoris.  Patient is having palpitations.     Since I have last seen, the patient has been without any dizziness or syncope.  Denies having any headache ,abdominal pain ,nausea, vomiting , diarrhea constipation, loss of weight or loss of appetite.  Denies having any excessive bruising ,hematuria or blood in the stool.     Review of all systems negative except as indicated.     Reviewed ROS.     Assessment and Plan         [[[[[[[[[[[[[[[[[    Impression  ================.  - Exertional shortness of breath chest discomfort and palpitations.  Echocardiogram-normal except for mild mitral regurgitation- 7/7/2022  Lexiscan Cardiolite test-mild anterior ischemia     -History of palpitations and symptomatic bradycardia with heart rates of 30 per minute although no documentation was available.  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  No significant problems since last visit.     -status post loop recorder placement ([employer] linq) 05/13/2016  Battery depletion.  Patient has decided to leave loop recorder in place for now.     -incomplete right bundle-branch block and premature ventricular contractions.      - chest discomfort shortness of breath and jaw discomfort.  Possible angina pectoris.  Patient is better with isosorbide.     Cardiac catheterization  11/09/2015 revealed normal Coronary arteries and normal left ventricular function.    Echocardiogram showed mild left atrial enlargement and mild mitral regurgitation ( 10/22/2015 )      - status post cholecystectomy  hysterectomy left hip screw  placement bladder sling surgery and left knee surgery .  History of hip surgery.     -allergy to Claritin Bactrim and IVP dye and Ultram  =================    Plan  =============  Exertional shortness of breath chest discomfort and palpitations.  EKG showed sinus rhythm incomplete right bundle branch block  Stress Cardiolite test-mild anterior ischemia  Echocardiogram.-  Mild mitral regurgitation.     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     Status post loop recorder-5/13/2016.  Battery depletion.    Patient wants to leave the loop recorder in place for now.     Medications were reviewed and updated.     In view of continued symptoms patient was advised cardiac catheterization and coronary arteriography for definite evaluation of coronary artery status.  Patient is allergic to contrast.  Patient was given prescription for prednisone 20 mg p.o. every 6 hours 24 hours prior to the procedure.    Risks and benefits pros and cons of the procedure were discussed with patient including infection bleeding blood clot heart attack stroke allergic reaction to the dye.    Further plan will depend on patient's progress.  [[[[[[[[[[[                       Diagnosis Plan   1. Tachycardia-bradycardia (HCC)     2. History of loop recorder     3. Palpitations     4. Essential hypertension     5. Chest discomfort     6. Shortness of breath     7. Incomplete right bundle branch block     LAB RESULTS (LAST 7 DAYS)    CBC        BMP        CMP         BNP        TROPONIN        CoAg        Creatinine Clearance  CrCl cannot be calculated (Patient's most recent lab result is older than the maximum 30 days allowed.).    ABG        Radiology  No radiology results for the last day                The following portions of the patient's history were reviewed and updated as appropriate: allergies, current medications, past family history, past medical history, past social history, past surgical history and  problem list.    Review of Systems   Constitutional: Positive for malaise/fatigue.   Cardiovascular: Negative for chest pain, leg swelling, palpitations and syncope.   Respiratory: Negative for shortness of breath.    Skin: Negative for rash.   Gastrointestinal: Negative for nausea and vomiting.   Neurological: Negative for dizziness, light-headedness and numbness.   All other systems reviewed and are negative.        Current Outpatient Medications:   •  aspirin (aspirin) 81 MG EC tablet, PITO LOW DOSE 81 MG TBEC, Disp: , Rfl:   •  calcium carbonate (OS-RAVEN) 600 MG tablet, Take 600 mg by mouth Daily., Disp: , Rfl:   •  cholecalciferol (VITAMIN D3) 1000 units tablet, Take 1,000 Units by mouth Daily., Disp: , Rfl:   •  citalopram (CeleXA) 10 MG tablet, 1 tablet Daily., Disp: , Rfl:   •  diphenhydrAMINE (BENADRYL) 25 mg capsule, Take 25 mg by mouth Every 6 (Six) Hours As Needed for Itching., Disp: , Rfl:   •  montelukast (SINGULAIR) 10 MG tablet, , Disp: , Rfl:   •  Multiple Vitamins-Minerals (CENTRUM SILVER ADULT 50+) tablet, CENTRUM SILVER ADULT 50+ TABS, Disp: , Rfl:   •  omeprazole (priLOSEC) 40 MG capsule, , Disp: , Rfl:   •  Polyvinyl Alcohol-Povidone PF (HYPOTEARS) 1.4-0.6 % ophthalmic solution, 1-2 drops As Needed for Wound Care., Disp: , Rfl:   •  propranolol LA (INDERAL LA) 120 MG 24 hr capsule, Take 1 capsule by mouth Every Morning., Disp: 90 capsule, Rfl: 3  •  ranolazine (RANEXA) 500 MG 12 hr tablet, Take 500 mg by mouth 2 (Two) Times a Day., Disp: , Rfl:   •  RESTASIS 0.05 % ophthalmic emulsion, , Disp: , Rfl:   •  SUMAtriptan (IMITREX) 50 MG tablet, Take 1 tablet by mouth Daily., Disp: , Rfl:     Allergies   Allergen Reactions   • Contrast Dye Unknown (See Comments)   • Hydrocodone Unknown (See Comments)   • Oxycodone Unknown (See Comments)   • Sulfamethoxazole-Trimethoprim Hives   • Cyclobenzaprine Other (See Comments)     Off balance   • Loratadine Other (See Comments)   • Tramadol Hcl Unknown (See  "Comments)       Family History   Problem Relation Age of Onset   • Hypertension Mother    • Asthma Mother        Past Surgical History:   Procedure Laterality Date   • CARDIAC CATHETERIZATION         Past Medical History:   Diagnosis Date   • Acid reflux    • Anemia    • Bradycardia    • Palpitations        Family History   Problem Relation Age of Onset   • Hypertension Mother    • Asthma Mother        Social History     Socioeconomic History   • Marital status:    Tobacco Use   • Smoking status: Former Smoker   • Smokeless tobacco: Never Used   Vaping Use   • Vaping Use: Never used   Substance and Sexual Activity   • Alcohol use: Yes     Comment: wine with dinner   • Drug use: Never   • Sexual activity: Defer         Procedures      Objective:       Physical Exam    /71 (BP Location: Right arm, Patient Position: Sitting, Cuff Size: Large Adult)   Pulse 64   Ht 167.6 cm (66\")   Wt 79.4 kg (175 lb)   SpO2 96%   BMI 28.25 kg/m²   The patient is alert, oriented and in no distress.    Vital signs as noted above.    Head and neck revealed no carotid bruits or jugular venous distension.  No thyromegaly or lymphadenopathy is present.    Lungs clear.  No wheezing.  Breath sounds are normal bilaterally.    Heart normal first and second heart sounds.  No murmur..  No pericardial rub is present.  No gallop is present.    Abdomen soft and nontender.  No organomegaly is present.    Extremities revealed good peripheral pulses without any pedal edema.    Skin warm and dry.    Musculoskeletal system is grossly normal.    CNS grossly normal.    Reviewed and updated.        "

## 2022-08-25 ENCOUNTER — OFFICE VISIT (OUTPATIENT)
Dept: CARDIOLOGY | Facility: CLINIC | Age: 77
End: 2022-08-25

## 2022-08-25 VITALS
DIASTOLIC BLOOD PRESSURE: 71 MMHG | WEIGHT: 175 LBS | HEIGHT: 66 IN | SYSTOLIC BLOOD PRESSURE: 119 MMHG | HEART RATE: 64 BPM | BODY MASS INDEX: 28.12 KG/M2 | OXYGEN SATURATION: 96 %

## 2022-08-25 DIAGNOSIS — R94.39 ABNORMAL NUCLEAR STRESS TEST: Primary | ICD-10-CM

## 2022-08-25 DIAGNOSIS — R07.89 CHEST DISCOMFORT: ICD-10-CM

## 2022-08-25 DIAGNOSIS — I45.10 INCOMPLETE RIGHT BUNDLE BRANCH BLOCK: ICD-10-CM

## 2022-08-25 DIAGNOSIS — R00.2 PALPITATIONS: ICD-10-CM

## 2022-08-25 DIAGNOSIS — R06.02 SHORTNESS OF BREATH: ICD-10-CM

## 2022-08-25 DIAGNOSIS — I10 ESSENTIAL HYPERTENSION: ICD-10-CM

## 2022-08-25 DIAGNOSIS — Z98.890 HISTORY OF LOOP RECORDER: ICD-10-CM

## 2022-08-25 DIAGNOSIS — I49.5 TACHYCARDIA-BRADYCARDIA: ICD-10-CM

## 2022-08-25 PROCEDURE — 99214 OFFICE O/P EST MOD 30 MIN: CPT | Performed by: INTERNAL MEDICINE

## 2022-08-30 ENCOUNTER — ON CAMPUS - OUTPATIENT (AMBULATORY)
Dept: URBAN - METROPOLITAN AREA HOSPITAL 2 | Facility: HOSPITAL | Age: 77
End: 2022-08-30
Payer: MEDICARE

## 2022-08-30 ENCOUNTER — OFFICE (AMBULATORY)
Dept: URBAN - METROPOLITAN AREA PATHOLOGY 4 | Facility: PATHOLOGY | Age: 77
End: 2022-08-30
Payer: COMMERCIAL

## 2022-08-30 ENCOUNTER — OFFICE (AMBULATORY)
Dept: URBAN - METROPOLITAN AREA PATHOLOGY 4 | Facility: PATHOLOGY | Age: 77
End: 2022-08-30
Payer: MEDICARE

## 2022-08-30 VITALS
DIASTOLIC BLOOD PRESSURE: 74 MMHG | HEART RATE: 82 BPM | RESPIRATION RATE: 17 BRPM | DIASTOLIC BLOOD PRESSURE: 85 MMHG | SYSTOLIC BLOOD PRESSURE: 129 MMHG | DIASTOLIC BLOOD PRESSURE: 66 MMHG | HEART RATE: 63 BPM | HEIGHT: 65 IN | OXYGEN SATURATION: 96 % | SYSTOLIC BLOOD PRESSURE: 133 MMHG | SYSTOLIC BLOOD PRESSURE: 117 MMHG | RESPIRATION RATE: 18 BRPM | HEART RATE: 64 BPM | DIASTOLIC BLOOD PRESSURE: 72 MMHG | RESPIRATION RATE: 16 BRPM | OXYGEN SATURATION: 97 % | TEMPERATURE: 96.8 F | HEART RATE: 68 BPM | SYSTOLIC BLOOD PRESSURE: 104 MMHG | OXYGEN SATURATION: 99 % | WEIGHT: 170 LBS | SYSTOLIC BLOOD PRESSURE: 115 MMHG | DIASTOLIC BLOOD PRESSURE: 69 MMHG | SYSTOLIC BLOOD PRESSURE: 130 MMHG | SYSTOLIC BLOOD PRESSURE: 149 MMHG | HEART RATE: 60 BPM | SYSTOLIC BLOOD PRESSURE: 120 MMHG

## 2022-08-30 DIAGNOSIS — D12.0 BENIGN NEOPLASM OF CECUM: ICD-10-CM

## 2022-08-30 DIAGNOSIS — K64.1 SECOND DEGREE HEMORRHOIDS: ICD-10-CM

## 2022-08-30 DIAGNOSIS — K57.30 DIVERTICULOSIS OF LARGE INTESTINE WITHOUT PERFORATION OR ABS: ICD-10-CM

## 2022-08-30 DIAGNOSIS — Z86.010 PERSONAL HISTORY OF COLONIC POLYPS: ICD-10-CM

## 2022-08-30 PROBLEM — K63.5 POLYP OF COLON: Status: ACTIVE | Noted: 2022-08-30

## 2022-08-30 LAB
GI HISTOLOGY: A. UNSPECIFIED: (no result)
GI HISTOLOGY: PDF REPORT: (no result)

## 2022-08-30 PROCEDURE — 45385 COLONOSCOPY W/LESION REMOVAL: CPT | Mod: PT | Performed by: INTERNAL MEDICINE

## 2022-08-30 PROCEDURE — 88305 TISSUE EXAM BY PATHOLOGIST: CPT | Mod: 26 | Performed by: INTERNAL MEDICINE

## 2022-09-01 PROBLEM — I45.10 INCOMPLETE RIGHT BUNDLE BRANCH BLOCK: Status: ACTIVE | Noted: 2022-09-01

## 2022-09-01 PROBLEM — R94.39 ABNORMAL NUCLEAR STRESS TEST: Status: ACTIVE | Noted: 2022-09-01

## 2022-09-01 PROBLEM — R00.2 PALPITATIONS: Status: ACTIVE | Noted: 2022-09-01

## 2022-09-01 PROBLEM — I49.5 TACHYCARDIA-BRADYCARDIA (HCC): Status: ACTIVE | Noted: 2022-09-01

## 2022-09-01 PROBLEM — I10 ESSENTIAL HYPERTENSION: Status: ACTIVE | Noted: 2022-09-01

## 2022-09-01 PROBLEM — R07.89 CHEST DISCOMFORT: Status: ACTIVE | Noted: 2022-09-01

## 2022-09-02 ENCOUNTER — HOSPITAL ENCOUNTER (OUTPATIENT)
Facility: HOSPITAL | Age: 77
Setting detail: HOSPITAL OUTPATIENT SURGERY
Discharge: HOME | End: 2022-09-02
Attending: INTERNAL MEDICINE | Admitting: INTERNAL MEDICINE

## 2022-09-02 ENCOUNTER — HOSPITAL ENCOUNTER (OUTPATIENT)
Dept: CARDIOLOGY | Facility: HOSPITAL | Age: 77
Discharge: HOME | End: 2022-09-02

## 2022-09-02 ENCOUNTER — LAB (OUTPATIENT)
Dept: LAB | Facility: HOSPITAL | Age: 77
End: 2022-09-02

## 2022-09-02 ENCOUNTER — HOSPITAL ENCOUNTER (OUTPATIENT)
Dept: GENERAL RADIOLOGY | Facility: HOSPITAL | Age: 77
Discharge: HOME | End: 2022-09-02

## 2022-09-02 VITALS
WEIGHT: 174.6 LBS | HEART RATE: 67 BPM | RESPIRATION RATE: 20 BRPM | DIASTOLIC BLOOD PRESSURE: 45 MMHG | OXYGEN SATURATION: 94 % | SYSTOLIC BLOOD PRESSURE: 118 MMHG | BODY MASS INDEX: 29.09 KG/M2 | HEIGHT: 65 IN | TEMPERATURE: 96.9 F

## 2022-09-02 DIAGNOSIS — Z98.890 HISTORY OF LOOP RECORDER: ICD-10-CM

## 2022-09-02 DIAGNOSIS — R94.39 ABNORMAL NUCLEAR STRESS TEST: ICD-10-CM

## 2022-09-02 DIAGNOSIS — R00.2 PALPITATIONS: ICD-10-CM

## 2022-09-02 DIAGNOSIS — I10 ESSENTIAL HYPERTENSION: ICD-10-CM

## 2022-09-02 DIAGNOSIS — R06.02 SHORTNESS OF BREATH: ICD-10-CM

## 2022-09-02 DIAGNOSIS — I49.5 TACHYCARDIA-BRADYCARDIA: ICD-10-CM

## 2022-09-02 DIAGNOSIS — R07.89 CHEST DISCOMFORT: ICD-10-CM

## 2022-09-02 DIAGNOSIS — I45.10 INCOMPLETE RIGHT BUNDLE BRANCH BLOCK: ICD-10-CM

## 2022-09-02 DIAGNOSIS — Z01.818 PREOP TESTING: Primary | ICD-10-CM

## 2022-09-02 LAB
ANION GAP SERPL CALCULATED.3IONS-SCNC: 11 MMOL/L (ref 5–15)
BUN SERPL-MCNC: 12 MG/DL (ref 8–23)
BUN/CREAT SERPL: 20.7 (ref 7–25)
CALCIUM SPEC-SCNC: 9.2 MG/DL (ref 8.6–10.5)
CHLORIDE SERPL-SCNC: 103 MMOL/L (ref 98–107)
CHOLEST SERPL-MCNC: 204 MG/DL (ref 0–200)
CO2 SERPL-SCNC: 24 MMOL/L (ref 22–29)
CREAT SERPL-MCNC: 0.58 MG/DL (ref 0.57–1)
DEPRECATED RDW RBC AUTO: 43.8 FL (ref 37–54)
EGFRCR SERPLBLD CKD-EPI 2021: 92.2 ML/MIN/1.73
ERYTHROCYTE [DISTWIDTH] IN BLOOD BY AUTOMATED COUNT: 14 % (ref 12.3–15.4)
GLUCOSE SERPL-MCNC: 132 MG/DL (ref 65–99)
HCT VFR BLD AUTO: 36 % (ref 34–46.6)
HDLC SERPL-MCNC: 70 MG/DL (ref 40–60)
HGB BLD-MCNC: 11.9 G/DL (ref 12–15.9)
INR PPP: 1.04 (ref 0.93–1.1)
LDLC SERPL CALC-MCNC: 121 MG/DL (ref 0–100)
LDLC/HDLC SERPL: 1.7 {RATIO}
MCH RBC QN AUTO: 30 PG (ref 26.6–33)
MCHC RBC AUTO-ENTMCNC: 33.1 G/DL (ref 31.5–35.7)
MCV RBC AUTO: 90.7 FL (ref 79–97)
PLATELET # BLD AUTO: 237 10*3/MM3 (ref 140–450)
PMV BLD AUTO: 7.7 FL (ref 6–12)
POTASSIUM SERPL-SCNC: 4 MMOL/L (ref 3.5–5.2)
PROTHROMBIN TIME: 10.7 SECONDS (ref 9.6–11.7)
RBC # BLD AUTO: 3.98 10*6/MM3 (ref 3.77–5.28)
SARS-COV-2 RNA RESP QL NAA+PROBE: NOT DETECTED
SODIUM SERPL-SCNC: 138 MMOL/L (ref 136–145)
TRIGL SERPL-MCNC: 74 MG/DL (ref 0–150)
VLDLC SERPL-MCNC: 13 MG/DL (ref 5–40)
WBC NRBC COR # BLD: 7.5 10*3/MM3 (ref 3.4–10.8)

## 2022-09-02 PROCEDURE — U0003 INFECTIOUS AGENT DETECTION BY NUCLEIC ACID (DNA OR RNA); SEVERE ACUTE RESPIRATORY SYNDROME CORONAVIRUS 2 (SARS-COV-2) (CORONAVIRUS DISEASE [COVID-19]), AMPLIFIED PROBE TECHNIQUE, MAKING USE OF HIGH THROUGHPUT TECHNOLOGIES AS DESCRIBED BY CMS-2020-01-R: HCPCS

## 2022-09-02 PROCEDURE — 99152 MOD SED SAME PHYS/QHP 5/>YRS: CPT | Performed by: INTERNAL MEDICINE

## 2022-09-02 PROCEDURE — 93460 R&L HRT ART/VENTRICLE ANGIO: CPT | Performed by: INTERNAL MEDICINE

## 2022-09-02 PROCEDURE — 85610 PROTHROMBIN TIME: CPT

## 2022-09-02 PROCEDURE — 25010000002 FENTANYL CITRATE (PF) 100 MCG/2ML SOLUTION: Performed by: INTERNAL MEDICINE

## 2022-09-02 PROCEDURE — 71046 X-RAY EXAM CHEST 2 VIEWS: CPT

## 2022-09-02 PROCEDURE — 85027 COMPLETE CBC AUTOMATED: CPT

## 2022-09-02 PROCEDURE — 80061 LIPID PANEL: CPT

## 2022-09-02 PROCEDURE — C9803 HOPD COVID-19 SPEC COLLECT: HCPCS

## 2022-09-02 PROCEDURE — 36415 COLL VENOUS BLD VENIPUNCTURE: CPT

## 2022-09-02 PROCEDURE — C1894 INTRO/SHEATH, NON-LASER: HCPCS | Performed by: INTERNAL MEDICINE

## 2022-09-02 PROCEDURE — 0 IOPAMIDOL PER 1 ML: Performed by: INTERNAL MEDICINE

## 2022-09-02 PROCEDURE — 25010000002 MIDAZOLAM PER 1 MG: Performed by: INTERNAL MEDICINE

## 2022-09-02 PROCEDURE — C1769 GUIDE WIRE: HCPCS | Performed by: INTERNAL MEDICINE

## 2022-09-02 PROCEDURE — 80048 BASIC METABOLIC PNL TOTAL CA: CPT

## 2022-09-02 RX ORDER — LIDOCAINE HYDROCHLORIDE 20 MG/ML
INJECTION, SOLUTION INFILTRATION; PERINEURAL AS NEEDED
Status: DISCONTINUED | OUTPATIENT
Start: 2022-09-02 | End: 2022-09-02 | Stop reason: HOSPADM

## 2022-09-02 RX ORDER — FENTANYL CITRATE 50 UG/ML
INJECTION, SOLUTION INTRAMUSCULAR; INTRAVENOUS AS NEEDED
Status: DISCONTINUED | OUTPATIENT
Start: 2022-09-02 | End: 2022-09-02 | Stop reason: HOSPADM

## 2022-09-02 RX ORDER — SODIUM CHLORIDE 9 MG/ML
30 INJECTION, SOLUTION INTRAVENOUS CONTINUOUS
Status: DISCONTINUED | OUTPATIENT
Start: 2022-09-02 | End: 2022-09-03 | Stop reason: HOSPADM

## 2022-09-02 RX ORDER — SODIUM CHLORIDE 9 MG/ML
250 INJECTION, SOLUTION INTRAVENOUS ONCE AS NEEDED
Status: DISCONTINUED | OUTPATIENT
Start: 2022-09-02 | End: 2022-09-03 | Stop reason: HOSPADM

## 2022-09-02 RX ORDER — PREDNISONE 20 MG/1
20 TABLET ORAL TAKE AS DIRECTED
COMMUNITY

## 2022-09-02 RX ORDER — UREA 10 %
3 LOTION (ML) TOPICAL NIGHTLY PRN
COMMUNITY

## 2022-09-02 RX ORDER — MIDAZOLAM HYDROCHLORIDE 1 MG/ML
INJECTION INTRAMUSCULAR; INTRAVENOUS AS NEEDED
Status: DISCONTINUED | OUTPATIENT
Start: 2022-09-02 | End: 2022-09-02 | Stop reason: HOSPADM

## 2022-09-02 RX ORDER — ONDANSETRON 2 MG/ML
4 INJECTION INTRAMUSCULAR; INTRAVENOUS EVERY 6 HOURS PRN
Status: DISCONTINUED | OUTPATIENT
Start: 2022-09-02 | End: 2022-09-03 | Stop reason: HOSPADM

## 2022-09-02 RX ORDER — ONDANSETRON 4 MG/1
4 TABLET, FILM COATED ORAL EVERY 6 HOURS PRN
Status: DISCONTINUED | OUTPATIENT
Start: 2022-09-02 | End: 2022-09-03 | Stop reason: HOSPADM

## 2022-09-02 RX ORDER — ACETAMINOPHEN 325 MG/1
650 TABLET ORAL EVERY 4 HOURS PRN
Status: DISCONTINUED | OUTPATIENT
Start: 2022-09-02 | End: 2022-09-03 | Stop reason: HOSPADM

## 2022-09-02 RX ORDER — DIPHENHYDRAMINE HCL 25 MG
25 CAPSULE ORAL EVERY 6 HOURS PRN
Status: DISCONTINUED | OUTPATIENT
Start: 2022-09-02 | End: 2022-09-03 | Stop reason: HOSPADM

## 2022-09-02 RX ADMIN — SODIUM CHLORIDE 30 ML/HR: 9 INJECTION, SOLUTION INTRAVENOUS at 14:43

## 2022-09-02 NOTE — DISCHARGE INSTRUCTIONS
Post Cath Instructions      Call Dr. Patel’s office to schedule a follow up appointment in 2 weeks at [phone]    .  Specific Physician Instructions: ***    Drink plenty of fluids for the next 24 hours.  This helps to eliminate the dye used in your procedure through urination.  You may resume a normal diet; however, try to avoid foods that would cause gas or constipation.    Sedative medication given to you during your catheterization may decrease your judgement and reaction time for up to 24-48 hours.  Therefore:  DO NOT drive or operate hazardous machinery (48 hours)  DO NOT consume alcoholic beverages  DO NOT make any important/legal decisions  Have someone stay with you for at least 24 hours    To allow proper healing and prevent bleeding, the following activities are to be strictly avoided for the next 24-48 hours:  Excessive bending at wound site  Straining (anything that would tense up muscles around the affected puncture site)  Lifting objects greater than 5 pounds, pushing, or pulling for 5 days  For Groin Cases:  Refrain from sexual activity  Refrain from running or vigorous walking  No prolonged sitting or standing  Limit stair climbing as much as possible    Keep the puncture site clean and dry.  You may remove the dressing tomorrow and replace it with a band-aid for at least one additional day.  Gently clean the site with mild soap and water.  No scrubbing/rubbing and lightly pat the area dry.  Showers are acceptable; however, avoid submerging in water (tub baths, hot tubs, swimming pools, dishwater, etc…) for at least one week.  The site should be completely healed before resuming these activities to reduce the risk of infection.  Check the site often.  Watch for signs and symptoms of infection and notify your physician if any of the following occur:  Bleeding or an increase in swelling at the puncture site  Fever  Increased soreness around puncture site  Foul odor or significant drainage from the  puncture site  Swelling, redness, or warmth at the puncture site    **A bruise or small “pea sized” lump under the skin at the puncture site is not unusual.  This should disappear within 3-4 weeks.**  CONTACT YOUR PHYSICIAN OR CALL 911 IF YOU EXPERIENCE ANY OF THE FOLLOWING:  Increased angina (chest pain) or frequent sensations of pressure, burning, pain, or other discomfort in the chest, arm, jaws, or stomach  Lightheadedness, dizziness, faint feeling, sweating, or difficulty breathing  Odd sensation changes like numbness, tingling, coldness, or pain in the arm or leg in which the catheter was inserted  Limb in which the catheter was inserted becomes pale/bluish in color    IMPORTANT:  Although this occurs very rarely, if you should develop bright red or excessive bleeding, feel a “pop” inside at the insertion site, or notice a sudden increase in swelling larger than a walnut, you should call 911.  Hold continuous firm pressure to the access site until emergency personnel arrive.  It is best if someone else can do this for you.

## 2022-09-06 ENCOUNTER — TELEPHONE (OUTPATIENT)
Dept: CARDIOLOGY | Facility: CLINIC | Age: 77
End: 2022-09-06

## 2022-09-06 NOTE — TELEPHONE ENCOUNTER
NOT ABLE TO CONTACT PATIENT ON HOME NUMBER. LEFT MESSAGE ON CELL.  SHE NEEDS TO BE WORKED IN, BUT WE WILL NOT BE ABLE TO WORK HER IN ON 9/16/22. DR. COLVIN NOT IN OFFICE ON FRIDAYS.

## 2022-09-06 NOTE — TELEPHONE ENCOUNTER
Caller: Joie Godinez    Relationship to patient: Self    Best call back number: [phone]    Chief complaint: 2 WEEK HOSP F/U    Type of visit: 2 WEEK HOSP F/U    Requested date: 9/16/22 H[facility] 1ST AVAIL IS 9/29/22    If rescheduling, when is the original appointment: N/A

## 2022-09-13 ENCOUNTER — OFFICE VISIT (OUTPATIENT)
Dept: CARDIOLOGY | Facility: CLINIC | Age: 77
End: 2022-09-13

## 2022-09-13 VITALS
HEART RATE: 61 BPM | WEIGHT: 174 LBS | BODY MASS INDEX: 28.99 KG/M2 | OXYGEN SATURATION: 98 % | DIASTOLIC BLOOD PRESSURE: 76 MMHG | HEIGHT: 65 IN | SYSTOLIC BLOOD PRESSURE: 140 MMHG

## 2022-09-13 DIAGNOSIS — R07.89 CHEST DISCOMFORT: ICD-10-CM

## 2022-09-13 DIAGNOSIS — R00.2 PALPITATIONS: ICD-10-CM

## 2022-09-13 DIAGNOSIS — R06.02 SHORTNESS OF BREATH: ICD-10-CM

## 2022-09-13 DIAGNOSIS — Z98.890 HISTORY OF LOOP RECORDER: ICD-10-CM

## 2022-09-13 DIAGNOSIS — I10 ESSENTIAL HYPERTENSION: ICD-10-CM

## 2022-09-13 DIAGNOSIS — I49.5 TACHYCARDIA-BRADYCARDIA: Primary | ICD-10-CM

## 2022-09-13 PROCEDURE — 99213 OFFICE O/P EST LOW 20 MIN: CPT | Performed by: INTERNAL MEDICINE

## 2022-09-13 NOTE — PROGRESS NOTES
Encounter Date:09/13/2022  Last seen 8/25/2022      Patient ID: Joie Godinez is a 77 y.o. female.    Chief Complaint:  Palpitations  Tachycardia bradycardia syndrome  Loop recorder  Incomplete right bundle branch block     History of Present Illness  Patient recently had cardiac catheterization and coronary arteriography and was released home.    Since I have last seen, the patient has been without any chest discomfort ,shortness of breath, palpitations, dizziness or syncope.  Denies having any headache ,abdominal pain ,nausea, vomiting , diarrhea constipation, loss of weight or loss of appetite.  Denies having any excessive bruising ,hematuria or blood in the stool.    Review of all systems negative except as indicated.    Reviewed ROS.  Assessment and Plan         [[[[[[[[[[[[[[[[[    Impression  ================.  - Exertional shortness of breath chest discomfort and palpitations.  Echocardiogram-normal except for mild mitral regurgitation- 7/7/2022  Lexiscan Cardiolite test-mild anterior ischemia    Cardiac catheterization 9/2/2022  No evidence for pulmonary hypertension is present.  Left ventricle size and contractility is normal with ejection fraction of 60%.  Normal epicardial coronary arteries.     -History of palpitations and symptomatic bradycardia with heart rates of 30 per minute although no documentation was available.  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  No significant problems since last visit.     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  Patient has decided to leave loop recorder in place for now.     -incomplete right bundle-branch block and premature ventricular contractions.      - chest discomfort shortness of breath and jaw discomfort.  Possible angina pectoris.  Patient is better with isosorbide.     Cardiac catheterization  11/09/2015 revealed normal Coronary arteries and normal left ventricular function.    Echocardiogram showed mild left atrial  enlargement and mild mitral regurgitation ( 10/22/2015 )      - status post cholecystectomy hysterectomy left hip screw  placement bladder sling surgery and left knee surgery .  History of hip surgery.     -allergy to Claritin Bactrim and IVP dye and Ultram  =================    Plan  =============  Exertional shortness of breath chest discomfort and palpitations.  EKG showed sinus rhythm incomplete right bundle branch block  Stress Cardiolite test-mild anterior ischemia  Echocardiogram.-  Mild mitral regurgitation.  Cardiac catheterization 9/2/2022-as above-normal.  Patient was educated regarding the results of the testing.     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     Status post loop recorder-5/13/2016.  Battery depletion.    Patient wants to leave the loop recorder in place for now.     Medications were reviewed and updated.    Follow-up in the office at her regularly scheduled appointment.     Further plan will depend on patient's progress.  [[[[[[[[[[[                          Diagnosis Plan   1. Tachycardia-bradycardia (HCC)     2. History of loop recorder     3. Palpitations     4. Shortness of breath     5. Chest discomfort     6. Essential hypertension     LAB RESULTS (LAST 7 DAYS)    CBC        BMP        CMP         BNP        TROPONIN        CoAg        Creatinine Clearance  Estimated Creatinine Clearance: 81.7 mL/min (by C-G formula based on SCr of 0.58 mg/dL).    ABG        Radiology  No radiology results for the last day                The following portions of the patient's history were reviewed and updated as appropriate: allergies, current medications, past family history, past medical history, past social history, past surgical history and problem list.    Review of Systems   Constitutional: Negative for malaise/fatigue.   Cardiovascular: Negative for chest pain, leg swelling, palpitations and syncope.   Respiratory: Negative for shortness of breath.    Skin: Negative for rash.    Gastrointestinal: Negative for nausea and vomiting.   Neurological: Negative for dizziness, light-headedness and numbness.   All other systems reviewed and are negative.        Current Outpatient Medications:   •  aspirin (aspirin) 81 MG EC tablet, Take 81 mg by mouth Every Night., Disp: , Rfl:   •  calcium carbonate (OS-RAVEN) 600 MG tablet, Take 600 mg by mouth 2 (Two) Times a Day With Meals., Disp: , Rfl:   •  cholecalciferol (VITAMIN D3) 1000 units tablet, Take 1,000 Units by mouth Daily., Disp: , Rfl:   •  citalopram (CeleXA) 10 MG tablet, 1 tablet Daily., Disp: , Rfl:   •  diphenhydrAMINE (BENADRYL) 25 mg capsule, Take 25 mg by mouth Every 6 (Six) Hours As Needed for Itching., Disp: , Rfl:   •  melatonin 1 MG tablet, Take 3 mg by mouth At Night As Needed for Sleep., Disp: , Rfl:   •  montelukast (SINGULAIR) 10 MG tablet, Take 10 mg by mouth Every Night., Disp: , Rfl:   •  omeprazole (priLOSEC) 40 MG capsule, Take 40 mg by mouth Daily., Disp: , Rfl:   •  Polyvinyl Alcohol-Povidone PF (HYPOTEARS) 1.4-0.6 % ophthalmic solution, Administer 1-2 drops to both eyes As Needed for Wound Care., Disp: , Rfl:   •  predniSONE (DELTASONE) 20 MG tablet, Take 20 mg by mouth Take As Directed. 1 pill Thursday at 1200, 1 pill Thursday at1 800, 1 pill Friday 0000, 1 pill Friday 0600 in preparation for heart cath, Disp: , Rfl:   •  propranolol LA (INDERAL LA) 120 MG 24 hr capsule, Take 1 capsule by mouth Every Morning., Disp: 90 capsule, Rfl: 3  •  ranolazine (RANEXA) 500 MG 12 hr tablet, Take 500 mg by mouth 2 (Two) Times a Day., Disp: , Rfl:   •  RESTASIS 0.05 % ophthalmic emulsion, Administer 1 drop to both eyes Every 12 (Twelve) Hours., Disp: , Rfl:   •  SUMAtriptan (IMITREX) 50 MG tablet, Take 1 tablet by mouth As Needed for Migraine., Disp: , Rfl:     Allergies   Allergen Reactions   • Contrast Dye Unknown (See Comments)   • Hydrocodone Unknown (See Comments)   • Oxycodone Unknown (See Comments)   •  "Sulfamethoxazole-Trimethoprim Hives   • Cyclobenzaprine Other (See Comments)     Off balance   • Loratadine Other (See Comments)   • Tramadol Hcl Unknown (See Comments)       Family History   Problem Relation Age of Onset   • Hypertension Mother    • Asthma Mother        Past Surgical History:   Procedure Laterality Date   • CARDIAC CATHETERIZATION     • CARDIAC CATHETERIZATION N/A 9/2/2022    Procedure: Left and Right Heart Cath with Coronary Angiography;  Surgeon: Otis Patel MD;  Location: [facility] CATH INVASIVE LOC[facility];  Service: Cardiovascular;  Laterality: N/A;       Past Medical History:   Diagnosis Date   • Acid reflux    • Anemia    • Bradycardia    • History of loop recorder     approximately 2017 or thereabouts   • Hyperlipidemia    • Mobility poor     cane/walker PRN   • Palpitations        Family History   Problem Relation Age of Onset   • Hypertension Mother    • Asthma Mother        Social History     Socioeconomic History   • Marital status:    Tobacco Use   • Smoking status: Former Smoker   • Smokeless tobacco: Never Used   Vaping Use   • Vaping Use: Never used   Substance and Sexual Activity   • Alcohol use: Yes     Alcohol/week: 7.0 standard drinks     Types: 7 Glasses of wine per week     Comment: wine with dinner   • Drug use: Never   • Sexual activity: Defer         Procedures      Objective:       Physical Exam    /76 (BP Location: Right arm, Patient Position: Sitting, Cuff Size: Large Adult)   Pulse 61   Ht 165.1 cm (65\")   Wt 78.9 kg (174 lb)   SpO2 98%   BMI 28.96 kg/m²   The patient is alert, oriented and in no distress.    Vital signs as noted above.    Head and neck revealed no carotid bruits or jugular venous distension.  No thyromegaly or lymphadenopathy is present.    Lungs clear.  No wheezing.  Breath sounds are normal bilaterally.    Heart normal first and second heart sounds.  No murmur..  No pericardial rub is present.  No gallop is present.    Abdomen soft " and nontender.  No organomegaly is present.    Extremities revealed good peripheral pulses without any pedal edema.    Skin warm and dry.    Musculoskeletal system is grossly normal.    CNS grossly normal.    Reviewed and updated.

## 2023-01-04 ENCOUNTER — OFFICE (AMBULATORY)
Dept: URBAN - METROPOLITAN AREA CLINIC 64 | Facility: CLINIC | Age: 78
End: 2023-01-04

## 2023-01-04 VITALS
DIASTOLIC BLOOD PRESSURE: 61 MMHG | SYSTOLIC BLOOD PRESSURE: 110 MMHG | HEART RATE: 65 BPM | WEIGHT: 174 LBS | HEIGHT: 65 IN

## 2023-01-04 DIAGNOSIS — R11.0 NAUSEA: ICD-10-CM

## 2023-01-04 DIAGNOSIS — K21.9 GASTRO-ESOPHAGEAL REFLUX DISEASE WITHOUT ESOPHAGITIS: ICD-10-CM

## 2023-01-04 PROCEDURE — 99212 OFFICE O/P EST SF 10 MIN: CPT | Performed by: NURSE PRACTITIONER

## 2023-01-04 RX ORDER — ONDANSETRON 4 MG/1
12 TABLET, ORALLY DISINTEGRATING ORAL
Qty: 30 | Refills: 4 | Status: ACTIVE
Start: 2023-01-04

## 2023-01-04 RX ORDER — OMEPRAZOLE 40 MG/1
40 CAPSULE, DELAYED RELEASE ORAL
Qty: 90 | Refills: 3 | Status: ACTIVE
Start: 2021-10-20

## 2023-01-25 RX ORDER — PROPRANOLOL HYDROCHLORIDE 120 MG/1
CAPSULE, EXTENDED RELEASE ORAL
Qty: 90 CAPSULE | Refills: 3 | Status: SHIPPED | OUTPATIENT
Start: 2023-01-25

## 2023-01-25 NOTE — TELEPHONE ENCOUNTER
Rx Refill Note  Requested Prescriptions     Pending Prescriptions Disp Refills   • propranolol LA (INDERAL LA) 120 MG 24 hr capsule [Pharmacy Med Name: PROPRANOLOL  CAP 120MG ER] 90 capsule 3     Sig: TAKE 1 CAPSULE EVERY       MORNING      Last office visit with prescribing clinician: 9/13/2022      Next office visit with prescribing clinician: 3/1/2023                     Evelin Magallanes MA  01/25/23, 15:48 EST

## 2023-02-07 ENCOUNTER — TRANSCRIBE ORDERS (OUTPATIENT)
Dept: ADMINISTRATIVE | Facility: HOSPITAL | Age: 78
End: 2023-02-07
Payer: MEDICARE

## 2023-02-07 ENCOUNTER — HOSPITAL ENCOUNTER (OUTPATIENT)
Dept: CARDIOLOGY | Facility: HOSPITAL | Age: 78
Discharge: HOME | End: 2023-02-07
Admitting: FAMILY MEDICINE
Payer: MEDICARE

## 2023-02-07 DIAGNOSIS — R60.0 LOWER EXTREMITY EDEMA: Primary | ICD-10-CM

## 2023-02-07 DIAGNOSIS — R60.0 LOWER EXTREMITY EDEMA: ICD-10-CM

## 2023-02-07 LAB
BH CV LOWER VASCULAR LEFT COMMON FEMORAL AUGMENT: NORMAL
BH CV LOWER VASCULAR LEFT COMMON FEMORAL COMPETENT: NORMAL
BH CV LOWER VASCULAR LEFT COMMON FEMORAL COMPRESS: NORMAL
BH CV LOWER VASCULAR LEFT COMMON FEMORAL PHASIC: NORMAL
BH CV LOWER VASCULAR LEFT COMMON FEMORAL SPONT: NORMAL
BH CV LOWER VASCULAR RIGHT COMMON FEMORAL AUGMENT: NORMAL
BH CV LOWER VASCULAR RIGHT COMMON FEMORAL COMPETENT: NORMAL
BH CV LOWER VASCULAR RIGHT COMMON FEMORAL COMPRESS: NORMAL
BH CV LOWER VASCULAR RIGHT COMMON FEMORAL PHASIC: NORMAL
BH CV LOWER VASCULAR RIGHT COMMON FEMORAL SPONT: NORMAL
BH CV LOWER VASCULAR RIGHT DISTAL FEMORAL COMPRESS: NORMAL
BH CV LOWER VASCULAR RIGHT GASTRONEMIUS COMPRESS: NORMAL
BH CV LOWER VASCULAR RIGHT GREATER SAPH AK COMPRESS: NORMAL
BH CV LOWER VASCULAR RIGHT GREATER SAPH BK COMPRESS: NORMAL
BH CV LOWER VASCULAR RIGHT LESSER SAPH COMPRESS: NORMAL
BH CV LOWER VASCULAR RIGHT MID FEMORAL AUGMENT: NORMAL
BH CV LOWER VASCULAR RIGHT MID FEMORAL COMPETENT: NORMAL
BH CV LOWER VASCULAR RIGHT MID FEMORAL COMPRESS: NORMAL
BH CV LOWER VASCULAR RIGHT MID FEMORAL PHASIC: NORMAL
BH CV LOWER VASCULAR RIGHT MID FEMORAL SPONT: NORMAL
BH CV LOWER VASCULAR RIGHT PERONEAL COMPRESS: NORMAL
BH CV LOWER VASCULAR RIGHT POPLITEAL AUGMENT: NORMAL
BH CV LOWER VASCULAR RIGHT POPLITEAL COMPETENT: NORMAL
BH CV LOWER VASCULAR RIGHT POPLITEAL COMPRESS: NORMAL
BH CV LOWER VASCULAR RIGHT POPLITEAL PHASIC: NORMAL
BH CV LOWER VASCULAR RIGHT POPLITEAL SPONT: NORMAL
BH CV LOWER VASCULAR RIGHT POSTERIOR TIBIAL COMPRESS: NORMAL
BH CV LOWER VASCULAR RIGHT PROXIMAL FEMORAL COMPRESS: NORMAL
BH CV LOWER VASCULAR RIGHT SAPHENOFEMORAL JUNCTION COMPRESS: NORMAL
BH CV VAS PRELIMINARY FINDINGS SCRIPTING: 1
MAXIMAL PREDICTED HEART RATE: 141 BPM
STRESS TARGET HR: 120 BPM

## 2023-02-07 PROCEDURE — 93971 EXTREMITY STUDY: CPT

## 2023-03-01 ENCOUNTER — OFFICE VISIT (OUTPATIENT)
Dept: CARDIOLOGY | Facility: CLINIC | Age: 78
End: 2023-03-01
Payer: MEDICARE

## 2023-03-01 VITALS
OXYGEN SATURATION: 97 % | HEIGHT: 65 IN | DIASTOLIC BLOOD PRESSURE: 55 MMHG | WEIGHT: 174 LBS | HEART RATE: 60 BPM | SYSTOLIC BLOOD PRESSURE: 104 MMHG | BODY MASS INDEX: 28.99 KG/M2

## 2023-03-01 DIAGNOSIS — R00.2 PALPITATIONS: Primary | ICD-10-CM

## 2023-03-01 DIAGNOSIS — I45.10 INCOMPLETE RIGHT BUNDLE BRANCH BLOCK: ICD-10-CM

## 2023-03-01 DIAGNOSIS — I10 ESSENTIAL HYPERTENSION: ICD-10-CM

## 2023-03-01 DIAGNOSIS — R06.02 SHORTNESS OF BREATH: ICD-10-CM

## 2023-03-01 DIAGNOSIS — R07.89 CHEST DISCOMFORT: ICD-10-CM

## 2023-03-01 DIAGNOSIS — Z98.890 HISTORY OF LOOP RECORDER: ICD-10-CM

## 2023-03-01 DIAGNOSIS — I49.5 TACHYCARDIA-BRADYCARDIA: ICD-10-CM

## 2023-03-01 PROCEDURE — 99214 OFFICE O/P EST MOD 30 MIN: CPT | Performed by: INTERNAL MEDICINE

## 2023-03-01 PROCEDURE — 93000 ELECTROCARDIOGRAM COMPLETE: CPT | Performed by: INTERNAL MEDICINE

## 2023-05-05 ENCOUNTER — APPOINTMENT (OUTPATIENT)
Dept: GENERAL RADIOLOGY | Facility: HOSPITAL | Age: 78
End: 2023-05-05
Payer: MEDICARE

## 2023-05-05 ENCOUNTER — HOSPITAL ENCOUNTER (EMERGENCY)
Facility: HOSPITAL | Age: 78
Discharge: HOME | End: 2023-05-05
Attending: EMERGENCY MEDICINE
Payer: MEDICARE

## 2023-05-05 VITALS
TEMPERATURE: 98.1 F | RESPIRATION RATE: 13 BRPM | HEIGHT: 65 IN | OXYGEN SATURATION: 94 % | DIASTOLIC BLOOD PRESSURE: 65 MMHG | WEIGHT: 174 LBS | SYSTOLIC BLOOD PRESSURE: 147 MMHG | HEART RATE: 55 BPM | BODY MASS INDEX: 28.99 KG/M2

## 2023-05-05 DIAGNOSIS — R00.2 PALPITATIONS: Primary | ICD-10-CM

## 2023-05-05 LAB
ALBUMIN SERPL-MCNC: 4.2 G/DL (ref 3.5–5.2)
ALBUMIN/GLOB SERPL: 1.7 G/DL
ALP SERPL-CCNC: 58 U/L (ref 39–117)
ALT SERPL W P-5'-P-CCNC: 10 U/L (ref 1–33)
ANION GAP SERPL CALCULATED.3IONS-SCNC: 12 MMOL/L (ref 5–15)
AST SERPL-CCNC: 18 U/L (ref 1–32)
BASOPHILS # BLD AUTO: 0.1 10*3/MM3 (ref 0–0.2)
BASOPHILS NFR BLD AUTO: 1 % (ref 0–1.5)
BILIRUB SERPL-MCNC: 0.9 MG/DL (ref 0–1.2)
BUN SERPL-MCNC: 13 MG/DL (ref 8–23)
BUN/CREAT SERPL: 17.1 (ref 7–25)
CALCIUM SPEC-SCNC: 9.1 MG/DL (ref 8.6–10.5)
CHLORIDE SERPL-SCNC: 103 MMOL/L (ref 98–107)
CO2 SERPL-SCNC: 26 MMOL/L (ref 22–29)
CREAT SERPL-MCNC: 0.76 MG/DL (ref 0.57–1)
DEPRECATED RDW RBC AUTO: 44.6 FL (ref 37–54)
EGFRCR SERPLBLD CKD-EPI 2021: 79.3 ML/MIN/1.73
EOSINOPHIL # BLD AUTO: 0.4 10*3/MM3 (ref 0–0.4)
EOSINOPHIL NFR BLD AUTO: 6.4 % (ref 0.3–6.2)
ERYTHROCYTE [DISTWIDTH] IN BLOOD BY AUTOMATED COUNT: 14.3 % (ref 12.3–15.4)
GEN 5 2HR TROPONIN T REFLEX: 9 NG/L
GLOBULIN UR ELPH-MCNC: 2.5 GM/DL
GLUCOSE SERPL-MCNC: 102 MG/DL (ref 65–99)
HCT VFR BLD AUTO: 39.8 % (ref 34–46.6)
HGB BLD-MCNC: 13.1 G/DL (ref 12–15.9)
LYMPHOCYTES # BLD AUTO: 2 10*3/MM3 (ref 0.7–3.1)
LYMPHOCYTES NFR BLD AUTO: 33.4 % (ref 19.6–45.3)
MAGNESIUM SERPL-MCNC: 2 MG/DL (ref 1.6–2.4)
MCH RBC QN AUTO: 29.7 PG (ref 26.6–33)
MCHC RBC AUTO-ENTMCNC: 33 G/DL (ref 31.5–35.7)
MCV RBC AUTO: 90.2 FL (ref 79–97)
MONOCYTES # BLD AUTO: 0.7 10*3/MM3 (ref 0.1–0.9)
MONOCYTES NFR BLD AUTO: 11.2 % (ref 5–12)
NEUTROPHILS NFR BLD AUTO: 2.8 10*3/MM3 (ref 1.7–7)
NEUTROPHILS NFR BLD AUTO: 48 % (ref 42.7–76)
NRBC BLD AUTO-RTO: 0 /100 WBC (ref 0–0.2)
PLATELET # BLD AUTO: 213 10*3/MM3 (ref 140–450)
PMV BLD AUTO: 8 FL (ref 6–12)
POTASSIUM SERPL-SCNC: 4 MMOL/L (ref 3.5–5.2)
PROT SERPL-MCNC: 6.7 G/DL (ref 6–8.5)
RBC # BLD AUTO: 4.41 10*6/MM3 (ref 3.77–5.28)
SODIUM SERPL-SCNC: 141 MMOL/L (ref 136–145)
TROPONIN T DELTA: -1 NG/L
TROPONIN T SERPL HS-MCNC: 10 NG/L
TSH SERPL DL<=0.05 MIU/L-ACNC: 2.42 UIU/ML (ref 0.27–4.2)
WBC NRBC COR # BLD: 5.9 10*3/MM3 (ref 3.4–10.8)

## 2023-05-05 PROCEDURE — 84484 ASSAY OF TROPONIN QUANT: CPT | Performed by: EMERGENCY MEDICINE

## 2023-05-05 PROCEDURE — 80053 COMPREHEN METABOLIC PANEL: CPT | Performed by: EMERGENCY MEDICINE

## 2023-05-05 PROCEDURE — 83735 ASSAY OF MAGNESIUM: CPT | Performed by: EMERGENCY MEDICINE

## 2023-05-05 PROCEDURE — 85025 COMPLETE CBC W/AUTO DIFF WBC: CPT | Performed by: EMERGENCY MEDICINE

## 2023-05-05 PROCEDURE — 93005 ELECTROCARDIOGRAM TRACING: CPT | Performed by: EMERGENCY MEDICINE

## 2023-05-05 PROCEDURE — 71045 X-RAY EXAM CHEST 1 VIEW: CPT

## 2023-05-05 PROCEDURE — 99283 EMERGENCY DEPT VISIT LOW MDM: CPT

## 2023-05-05 PROCEDURE — 84443 ASSAY THYROID STIM HORMONE: CPT | Performed by: EMERGENCY MEDICINE

## 2023-05-05 PROCEDURE — 36415 COLL VENOUS BLD VENIPUNCTURE: CPT

## 2023-05-05 RX ORDER — ASPIRIN 81 MG/1
324 TABLET, CHEWABLE ORAL ONCE
Status: COMPLETED | OUTPATIENT
Start: 2023-05-05 | End: 2023-05-05

## 2023-05-05 RX ORDER — SODIUM CHLORIDE 0.9 % (FLUSH) 0.9 %
10 SYRINGE (ML) INJECTION AS NEEDED
Status: DISCONTINUED | OUTPATIENT
Start: 2023-05-05 | End: 2023-05-05 | Stop reason: HOSPADM

## 2023-05-05 RX ADMIN — ASPIRIN 81 MG CHEWABLE TABLET 324 MG: 81 TABLET CHEWABLE at 14:38

## 2023-05-05 NOTE — ED PROVIDER NOTES
Subjective   History of Present Illness  78-year-old female presents for palpitations.  Endorsed chest pain in triage but denies to me.  States just felt like her heart was fluttering.  Was happening last night for a long period of time and happening just every once in a while today.  No shortness of breath.  States she does feel very tired.  No nausea or vomiting.  No diaphoresis.  No shortness of breath.    Review of Systems  Positive for palpitations.  Negative for chest pain or shortness of breath.  Past Medical History:   Diagnosis Date   • Acid reflux    • Anemia    • Bradycardia    • History of loop recorder     approximately 2017 or thereabouts   • Hyperlipidemia    • Mobility poor     cane/walker PRN   • Palpitations        Allergies   Allergen Reactions   • Contrast Dye (Echo Or Unknown Ct/Mr) Unknown (See Comments)   • Hydrocodone Unknown (See Comments)   • Oxycodone Unknown (See Comments)   • Sulfamethoxazole-Trimethoprim Hives   • Cyclobenzaprine Other (See Comments)     Off balance   • Loratadine Other (See Comments)   • Tramadol Hcl Unknown (See Comments)       Past Surgical History:   Procedure Laterality Date   • CARDIAC CATHETERIZATION     • CARDIAC CATHETERIZATION N/A 9/2/2022    Procedure: Left and Right Heart Cath with Coronary Angiography;  Surgeon: Otis Patel MD;  Location: [facility] CATH INVASIVE LOCATION;  Service: Cardiovascular;  Laterality: N/A;       Family History   Problem Relation Age of Onset   • Hypertension Mother    • Asthma Mother        Social History     Socioeconomic History   • Marital status:    Tobacco Use   • Smoking status: Former     Passive exposure: Past   • Smokeless tobacco: Never   Vaping Use   • Vaping Use: Never used   Substance and Sexual Activity   • Alcohol use: Yes     Alcohol/week: 7.0 standard drinks     Types: 7 Glasses of wine per week     Comment: wine with dinner   • Drug use: Never   • Sexual activity: Defer           Objective   Physical  "Exam  Constitutional:  No acute distress.  Head:  Atraumatic.  Normocephalic.   Eyes:  No scleral icterus. Normal conjunctivae  ENT:  Moist mucosa.  No nasal discharge present.  Cardiovascular:  Well perfused.  Equal pulses.  Regular rhythm and normal rate.  Normal capillary refill.    Pulmonary/Chest:  No respiratory distress.  Airway patent.  No tachypnea.  No accessory muscle usage.    Abdominal:  Nondistended. Nontender.   Extremities:  No peripheral edema.  No Deformity  Skin:  Warm, dry  Neurological:  Alert, awake, and appropriate.  Normal speech.      Procedures           ED Course      /65   Pulse 55   Temp 98.1 °F (36.7 °C) (Oral)   Resp 13   Ht 165.1 cm (65\")   Wt 78.9 kg (174 lb)   SpO2 94%   BMI 28.96 kg/m²   Labs Reviewed   COMPREHENSIVE METABOLIC PANEL - Abnormal; Notable for the following components:       Result Value    Glucose 102 (*)     All other components within normal limits    Narrative:     GFR Normal >60  Chronic Kidney Disease <60  Kidney Failure <15    The GFR formula is only valid for adults with stable renal function between ages 18 and 70.   TROPONIN - Abnormal; Notable for the following components:    HS Troponin T 10 (*)     All other components within normal limits    Narrative:     High Sensitive Troponin T Reference Range:  <10.0 ng/L- Negative Female for AMI  <15.0 ng/L- Negative Male for AMI  >=10 - Abnormal Female indicating possible myocardial injury.  >=15 - Abnormal Male indicating possible myocardial injury.   Clinicians would have to utilize clinical acumen, EKG, Troponin, and serial changes to determine if it is an Acute Myocardial Infarction or myocardial injury due to an underlying chronic condition.        CBC WITH AUTO DIFFERENTIAL - Abnormal; Notable for the following components:    Eosinophil % 6.4 (*)     All other components within normal limits   TSH - Normal   MAGNESIUM - Normal   HIGH SENSITIVITIY TROPONIN T 2HR - Normal    Narrative:     High " Sensitive Troponin T Reference Range:  <10.0 ng/L- Negative Female for AMI  <15.0 ng/L- Negative Male for AMI  >=10 - Abnormal Female indicating possible myocardial injury.  >=15 - Abnormal Male indicating possible myocardial injury.   Clinicians would have to utilize clinical acumen, EKG, Troponin, and serial changes to determine if it is an Acute Myocardial Infarction or myocardial injury due to an underlying chronic condition.        CBC AND DIFFERENTIAL    Narrative:     The following orders were created for panel order CBC & Differential.  Procedure                               Abnormality         Status                     ---------                               -----------         ------                     CBC Auto Differential[[id number]]        Abnormal            Final result                 Please view results for these tests on the individual orders.     Medications   aspirin chewable tablet 324 mg (324 mg Oral Given 5/5/23 1438)     XR Chest 1 View    Result Date: 5/5/2023  Impression: New patchy airspace opacity in the right base may be due to pneumonia or atelectasis. Correlate clinically. Electronically Signed: Isreal Cheng  5/5/2023 3:11 PM EDT  Workstation ID: [id number]                                         [facility]   EKG # 1  Signed and interpreted by the EP.  Time Interpreted: 2:21 PM  Rate: 60  Rhythm: Normal sinus rhythm  Axis: Normal axis  Intervals: Normal intervals  ST Segments: No acute ischemic changes     Comparison: No significant change from January 19,018 EKG    Review of prior records demonstrated that patient had cardiac catheterization done on September 1, 2022 that had normal coronary arteries and ejection fraction of 60% with normal contractility and no evidence of pulmonary hypertension.  Was performed by Dr. Patel.    Patient without pneumonia symptoms.  Labs not consistent with this.  Suspect this more likely atelectasis on chest x-ray.    EKG reassuring.  Troponin negative.  Very  low suspicion for ACS based on symptoms.  Patient having somewhat frequent premature junctional beats on telemetry.  When she has 3 days in a row she seems to feel the symptoms.  Suspect this is with her symptoms are.  We will give follow-up with her cardiologist.  Return ER precautions discussed.  Final diagnoses:   Palpitations       ED Disposition  ED Disposition     ED Disposition   Discharge    Condition   Stable    Comment   --             Otis Patel MD  [address] [facility] [place] [address]  [phone]    In 3 days           Medication List      No changes were made to your prescriptions during this visit.          Wilber Sanchez MD  05/05/23 7987

## 2023-05-07 LAB — QT INTERVAL: 454 MS

## 2023-05-09 ENCOUNTER — OFFICE VISIT (OUTPATIENT)
Dept: CARDIOLOGY | Facility: CLINIC | Age: 78
End: 2023-05-09
Payer: MEDICARE

## 2023-05-09 VITALS
SYSTOLIC BLOOD PRESSURE: 126 MMHG | OXYGEN SATURATION: 97 % | WEIGHT: 172 LBS | HEART RATE: 68 BPM | BODY MASS INDEX: 28.66 KG/M2 | HEIGHT: 65 IN | DIASTOLIC BLOOD PRESSURE: 72 MMHG

## 2023-05-09 DIAGNOSIS — R07.89 CHEST DISCOMFORT: ICD-10-CM

## 2023-05-09 DIAGNOSIS — R00.2 PALPITATIONS: ICD-10-CM

## 2023-05-09 DIAGNOSIS — I10 ESSENTIAL HYPERTENSION: ICD-10-CM

## 2023-05-09 DIAGNOSIS — R94.39 ABNORMAL NUCLEAR STRESS TEST: ICD-10-CM

## 2023-05-09 DIAGNOSIS — Z98.890 HISTORY OF LOOP RECORDER: ICD-10-CM

## 2023-05-09 DIAGNOSIS — R60.0 LOWER EXTREMITY EDEMA: ICD-10-CM

## 2023-05-09 DIAGNOSIS — R06.02 SHORTNESS OF BREATH: ICD-10-CM

## 2023-05-09 DIAGNOSIS — I49.5 TACHYCARDIA-BRADYCARDIA: Primary | ICD-10-CM

## 2023-05-09 DIAGNOSIS — I45.10 INCOMPLETE RIGHT BUNDLE BRANCH BLOCK: ICD-10-CM

## 2023-05-09 PROCEDURE — 99214 OFFICE O/P EST MOD 30 MIN: CPT | Performed by: INTERNAL MEDICINE

## 2023-05-09 PROCEDURE — 3074F SYST BP LT 130 MM HG: CPT | Performed by: INTERNAL MEDICINE

## 2023-05-09 PROCEDURE — 1160F RVW MEDS BY RX/DR IN RCRD: CPT | Performed by: INTERNAL MEDICINE

## 2023-05-09 PROCEDURE — 3078F DIAST BP <80 MM HG: CPT | Performed by: INTERNAL MEDICINE

## 2023-05-09 PROCEDURE — 1159F MED LIST DOCD IN RCRD: CPT | Performed by: INTERNAL MEDICINE

## 2023-05-09 NOTE — PROGRESS NOTES
Encounter Date:05/09/2023  Last seen 3/1/2023      Patient ID: Joie Godinez is a 78 y.o. female.    Chief Complaint:    Palpitations  Tachycardia bradycardia syndrome  Loop recorder  Incomplete right bundle branch block     History of Present Illness patient recently went to emergency room with some palpitations.  Patient was evaluated and was released home.  Patient is feeling better.    Since she was released from the ER, the patient has been without any chest discomfort ,shortness of breath, palpitations, dizziness or syncope.  Denies having any headache ,abdominal pain ,nausea, vomiting , diarrhea constipation, loss of weight or loss of appetite.  Denies having any excessive bruising ,hematuria or blood in the stool.    Review of all systems negative except as indicated.    Reviewed ROS.  Assessment and Plan         [[[[[[[[[[[[[[[[[    Impression  ================.  - Exertional shortness of breath chest discomfort and palpitations.    Echocardiogram-normal except for mild mitral regurgitation- 7/7/2022  Lexiscan Cardiolite test-mild anterior ischemia     Cardiac catheterization 9/2/2022  No evidence for pulmonary hypertension is present.  Left ventricle size and contractility is normal with ejection fraction of 60%.  Normal epicardial coronary arteries.     -History of palpitations and symptomatic bradycardia with heart rates of 30 per minute although no documentation was available.  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  No significant problems since last visit.     -status post loop recorder placement (Jamplify linq) 05/13/2016  Battery depletion.  Patient has decided to leave loop recorder in place for now.     -incomplete right bundle-branch block and premature ventricular contractions.      - chest discomfort shortness of breath and jaw discomfort.  Possible angina pectoris.  Patient is better with isosorbide.     Cardiac catheterization  11/09/2015 revealed normal Coronary arteries and normal left  ventricular function.    Echocardiogram showed mild left atrial enlargement and mild mitral regurgitation ( 10/22/2015 )      - status post cholecystectomy hysterectomy left hip screw  placement bladder sling surgery and left knee surgery .  History of hip surgery.     -allergy to Claritin Bactrim and IVP dye and Ultram  =================    Plan  =============  Patient recently had palpitations prompting ER visit.  Patient was discharged home.  Patient is doing better.    Exertional shortness of breath chest discomfort and palpitations.-Improved.     EKG showed sinus rhythm incomplete right bundle branch block- 3/1/2023..  EKG was not performed today.     Ischemic work-up.  Stress Cardiolite test-mild anterior ischemia  Echocardiogram.-  Mild mitral regurgitation.  Cardiac catheterization 9/2/2022-as above-normal.  Patient was educated regarding the results of the testing.     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     Status post loop recorder-5/13/2016.  attery depletion.    Patient wants to leave the loop recorder in place for now.     Medications were reviewed and updated.     Follow-up in the office  at her regularly scheduled appointment.     Further plan will depend on patient's progress.  [[[[[[[[[[[                         Diagnosis Plan   1. Tachycardia-bradycardia        2. History of loop recorder        3. Essential hypertension        4. Shortness of breath        5. Chest discomfort        6. Lower extremity edema        7. Incomplete right bundle branch block        8. Palpitations        9. Abnormal nuclear stress test        LAB RESULTS (LAST 7 DAYS)    CBC  Results from last 7 days   Lab Units 05/05/23  1442   WBC 10*3/mm3 5.90   RBC 10*6/mm3 4.41   HEMOGLOBIN g/dL 13.1   HEMATOCRIT % 39.8   MCV fL 90.2   PLATELETS 10*3/mm3 213       BMP  Results from last 7 days   Lab Units 05/05/23  1442   SODIUM mmol/L 141   POTASSIUM mmol/L 4.0   CHLORIDE mmol/L 103   CO2 mmol/L 26.0   BUN mg/dL 13    CREATININE mg/dL 0.76   GLUCOSE mg/dL 102*   MAGNESIUM mg/dL 2.0       CMP   Results from last 7 days   Lab Units 05/05/23  1442   SODIUM mmol/L 141   POTASSIUM mmol/L 4.0   CHLORIDE mmol/L 103   CO2 mmol/L 26.0   BUN mg/dL 13   CREATININE mg/dL 0.76   GLUCOSE mg/dL 102*   ALBUMIN g/dL 4.2   BILIRUBIN mg/dL 0.9   ALK PHOS U/L 58   AST (SGOT) U/L 18   ALT (SGPT) U/L 10         BNP        TROPONIN  Results from last 7 days   Lab Units 05/05/23  1736   HSTROP T ng/L 9       CoAg        Creatinine Clearance  Estimated Creatinine Clearance: 61 mL/min (by C-G formula based on SCr of 0.76 mg/dL).    ABG        Radiology  No radiology results for the last day                The following portions of the patient's history were reviewed and updated as appropriate: allergies, current medications, past family history, past medical history, past social history, past surgical history and problem list.    ROS      Current Outpatient Medications:   •  aspirin (aspirin) 81 MG EC tablet, Take 1 tablet by mouth Every Night., Disp: , Rfl:   •  calcium carbonate (OS-RAVEN) 600 MG tablet, Take 1 tablet by mouth 2 (Two) Times a Day With Meals., Disp: , Rfl:   •  cholecalciferol (VITAMIN D3) 1000 units tablet, Take 1 tablet by mouth Daily., Disp: , Rfl:   •  citalopram (CeleXA) 10 MG tablet, 1 tablet Daily., Disp: , Rfl:   •  diphenhydrAMINE (BENADRYL) 25 mg capsule, Take 1 capsule by mouth Every 6 (Six) Hours As Needed for Itching., Disp: , Rfl:   •  melatonin 1 MG tablet, Take 3 tablets by mouth At Night As Needed for Sleep., Disp: , Rfl:   •  montelukast (SINGULAIR) 10 MG tablet, Take 1 tablet by mouth Every Night., Disp: , Rfl:   •  omeprazole (priLOSEC) 40 MG capsule, Take 1 capsule by mouth Daily., Disp: , Rfl:   •  Polyvinyl Alcohol-Povidone PF (HYPOTEARS) 1.4-0.6 % ophthalmic solution, Administer 1-2 drops to both eyes As Needed for Wound Care., Disp: , Rfl:   •  predniSONE (DELTASONE) 20 MG tablet, Take 1 tablet by mouth Take As  Directed. 1 pill Thursday at 1200, 1 pill Thursday at1 800, 1 pill Friday 0000, 1 pill Friday 0600 in preparation for heart cath, Disp: , Rfl:   •  propranolol LA (INDERAL LA) 120 MG 24 hr capsule, TAKE 1 CAPSULE EVERY       MORNING, Disp: 90 capsule, Rfl: 3  •  ranolazine (RANEXA) 500 MG 12 hr tablet, Take 1 tablet by mouth 2 (Two) Times a Day., Disp: , Rfl:   •  RESTASIS 0.05 % ophthalmic emulsion, Administer 1 drop to both eyes Every 12 (Twelve) Hours., Disp: , Rfl:   •  SUMAtriptan (IMITREX) 50 MG tablet, Take 1 tablet by mouth As Needed for Migraine., Disp: , Rfl:     Allergies   Allergen Reactions   • Contrast Dye (Echo Or Unknown Ct/Mr) Unknown (See Comments)   • Hydrocodone Unknown (See Comments)   • Oxycodone Unknown (See Comments)   • Sulfamethoxazole-Trimethoprim Hives   • Cyclobenzaprine Other (See Comments)     Off balance   • Loratadine Other (See Comments)   • Tramadol Hcl Unknown (See Comments)       Family History   Problem Relation Age of Onset   • Hypertension Mother    • Asthma Mother        Past Surgical History:   Procedure Laterality Date   • CARDIAC CATHETERIZATION     • CARDIAC CATHETERIZATION N/A 9/2/2022    Procedure: Left and Right Heart Cath with Coronary Angiography;  Surgeon: Otis Patel MD;  Location: [facility] CATH INVASIVE LOCATION;  Service: Cardiovascular;  Laterality: N/A;       Past Medical History:   Diagnosis Date   • Acid reflux    • Anemia    • Bradycardia    • History of loop recorder     approximately 2017 or thereabouts   • Hyperlipidemia    • Mobility poor     cane/walker PRN   • Palpitations        Family History   Problem Relation Age of Onset   • Hypertension Mother    • Asthma Mother        Social History     Socioeconomic History   • Marital status:    Tobacco Use   • Smoking status: Former     Passive exposure: Past   • Smokeless tobacco: Never   Vaping Use   • Vaping Use: Never used   Substance and Sexual Activity   • Alcohol use: Yes     Alcohol/week:  "7.0 standard drinks     Types: 7 Glasses of wine per week     Comment: wine with dinner   • Drug use: Never   • Sexual activity: Defer         Procedures      Objective:       Physical Exam    /72 (BP Location: Left arm, Patient Position: Sitting, Cuff Size: Large Adult)   Pulse 68   Ht 165.1 cm (65\")   Wt 78 kg (172 lb)   SpO2 97% Comment: RA  BMI 28.62 kg/m²   The patient is alert, oriented and in no distress.    Vital signs as noted above.    Head and neck revealed no carotid bruits or jugular venous distension.  No thyromegaly or lymphadenopathy is present.    Lungs clear.  No wheezing.  Breath sounds are normal bilaterally.    Heart normal first and second heart sounds.  No murmur..  No pericardial rub is present.  No gallop is present.    Abdomen soft and nontender.  No organomegaly is present.    Extremities revealed good peripheral pulses without any pedal edema.    Skin warm and dry.    Musculoskeletal system is grossly normal.    CNS grossly normal.    Reviewed and updated.        "

## 2023-05-09 NOTE — PROGRESS NOTES
"Encounter Date:05/09/2023      Patient ID: Joie Godinez is a 78 y.o. female.    Chief Complaint:      History of Present Illness      Assessment and Plan               No diagnosis found.LAB RESULTS (LAST 7 DAYS)    CBC  Results from last 7 days   Lab Units 05/05/23  1442   WBC 10*3/mm3 5.90   RBC 10*6/mm3 4.41   HEMOGLOBIN g/dL 13.1   HEMATOCRIT % 39.8   MCV fL 90.2   PLATELETS 10*3/mm3 213       BMP  Results from last 7 days   Lab Units 05/05/23  1442   SODIUM mmol/L 141   POTASSIUM mmol/L 4.0   CHLORIDE mmol/L 103   CO2 mmol/L 26.0   BUN mg/dL 13   CREATININE mg/dL 0.76   GLUCOSE mg/dL 102*   MAGNESIUM mg/dL 2.0       CMP   Results from last 7 days   Lab Units 05/05/23  1442   SODIUM mmol/L 141   POTASSIUM mmol/L 4.0   CHLORIDE mmol/L 103   CO2 mmol/L 26.0   BUN mg/dL 13   CREATININE mg/dL 0.76   GLUCOSE mg/dL 102*   ALBUMIN g/dL 4.2   BILIRUBIN mg/dL 0.9   ALK PHOS U/L 58   AST (SGOT) U/L 18   ALT (SGPT) U/L 10         BNP        TROPONIN  Results from last 7 days   Lab Units 05/05/23  1736   HSTROP T ng/L 9       CoAg        Creatinine Clearance  Estimated Creatinine Clearance: 61 mL/min (by C-G formula based on SCr of 0.76 mg/dL).    ABG        Radiology  No radiology results for the last day                The following portions of the patient's history were reviewed and updated as appropriate: {history reviewed:20406::\"allergies\",\"current medications\",\"past family history\",\"past medical history\",\"past social history\",\"past surgical history\",\"problem list\"}.    Review of Systems   Constitutional: Negative for malaise/fatigue.   Cardiovascular: Positive for palpitations (ONE EPSIODE A FEW DAYS AGO - LASTED ABOUT 5 MIN PER PT ). Negative for chest pain, leg swelling and syncope.   Respiratory: Negative for shortness of breath.    Skin: Negative for rash.   Gastrointestinal: Negative for nausea and vomiting.   Neurological: Positive for dizziness and light-headedness. Negative for numbness.   All other systems " reviewed and are negative.        Current Outpatient Medications:   •  aspirin (aspirin) 81 MG EC tablet, Take 1 tablet by mouth Every Night., Disp: , Rfl:   •  calcium carbonate (OS-RAVEN) 600 MG tablet, Take 1 tablet by mouth 2 (Two) Times a Day With Meals., Disp: , Rfl:   •  cholecalciferol (VITAMIN D3) 1000 units tablet, Take 1 tablet by mouth Daily., Disp: , Rfl:   •  citalopram (CeleXA) 10 MG tablet, 1 tablet Daily., Disp: , Rfl:   •  diphenhydrAMINE (BENADRYL) 25 mg capsule, Take 1 capsule by mouth Every 6 (Six) Hours As Needed for Itching., Disp: , Rfl:   •  melatonin 1 MG tablet, Take 3 tablets by mouth At Night As Needed for Sleep., Disp: , Rfl:   •  montelukast (SINGULAIR) 10 MG tablet, Take 1 tablet by mouth Every Night., Disp: , Rfl:   •  omeprazole (priLOSEC) 40 MG capsule, Take 1 capsule by mouth Daily., Disp: , Rfl:   •  Polyvinyl Alcohol-Povidone PF (HYPOTEARS) 1.4-0.6 % ophthalmic solution, Administer 1-2 drops to both eyes As Needed for Wound Care., Disp: , Rfl:   •  predniSONE (DELTASONE) 20 MG tablet, Take 1 tablet by mouth Take As Directed. 1 pill Thursday at 1200, 1 pill Thursday at1 800, 1 pill Friday 0000, 1 pill Friday 0600 in preparation for heart cath, Disp: , Rfl:   •  propranolol LA (INDERAL LA) 120 MG 24 hr capsule, TAKE 1 CAPSULE EVERY       MORNING, Disp: 90 capsule, Rfl: 3  •  ranolazine (RANEXA) 500 MG 12 hr tablet, Take 1 tablet by mouth 2 (Two) Times a Day., Disp: , Rfl:   •  RESTASIS 0.05 % ophthalmic emulsion, Administer 1 drop to both eyes Every 12 (Twelve) Hours., Disp: , Rfl:   •  SUMAtriptan (IMITREX) 50 MG tablet, Take 1 tablet by mouth As Needed for Migraine., Disp: , Rfl:     Allergies   Allergen Reactions   • Contrast Dye (Echo Or Unknown Ct/Mr) Unknown (See Comments)   • Hydrocodone Unknown (See Comments)   • Oxycodone Unknown (See Comments)   • Sulfamethoxazole-Trimethoprim Hives   • Cyclobenzaprine Other (See Comments)     Off balance   • Loratadine Other (See  "Comments)   • Tramadol Hcl Unknown (See Comments)       Family History   Problem Relation Age of Onset   • Hypertension Mother    • Asthma Mother        Past Surgical History:   Procedure Laterality Date   • CARDIAC CATHETERIZATION     • CARDIAC CATHETERIZATION N/A 9/2/2022    Procedure: Left and Right Heart Cath with Coronary Angiography;  Surgeon: Otis Patel MD;  Location: [facility] CATH INVASIVE LOCATION;  Service: Cardiovascular;  Laterality: N/A;       Past Medical History:   Diagnosis Date   • Acid reflux    • Anemia    • Bradycardia    • History of loop recorder     approximately 2017 or thereabouts   • Hyperlipidemia    • Mobility poor     cane/walker PRN   • Palpitations        Family History   Problem Relation Age of Onset   • Hypertension Mother    • Asthma Mother        Social History     Socioeconomic History   • Marital status:    Tobacco Use   • Smoking status: Former     Passive exposure: Past   • Smokeless tobacco: Never   Vaping Use   • Vaping Use: Never used   Substance and Sexual Activity   • Alcohol use: Yes     Alcohol/week: 7.0 standard drinks     Types: 7 Glasses of wine per week     Comment: wine with dinner   • Drug use: Never   • Sexual activity: Defer         Procedures      Objective:       Physical Exam    /72 (BP Location: Left arm, Patient Position: Sitting, Cuff Size: Large Adult)   Pulse 68   Ht 165.1 cm (65\")   Wt 78 kg (172 lb)   SpO2 97% Comment: RA  BMI 28.62 kg/m²   The patient is alert, oriented and in no distress.    Vital signs as noted above.    Head and neck revealed no carotid bruits or jugular venous distension.  No thyromegaly or lymphadenopathy is present.    Lungs clear.  No wheezing.  Breath sounds are normal bilaterally.    Heart normal first and second heart sounds.  No murmur..  No pericardial rub is present.  No gallop is present.    Abdomen soft and nontender.  No organomegaly is present.    Extremities revealed good peripheral pulses " without any pedal edema.    Skin warm and dry.    Musculoskeletal system is grossly normal.    CNS grossly normal.

## 2023-10-30 ENCOUNTER — TRANSCRIBE ORDERS (OUTPATIENT)
Dept: ADMINISTRATIVE | Facility: HOSPITAL | Age: 78
End: 2023-10-30
Payer: MEDICARE

## 2023-10-30 DIAGNOSIS — R00.2 PALPITATIONS: Primary | ICD-10-CM

## 2023-11-06 ENCOUNTER — HOSPITAL ENCOUNTER (OUTPATIENT)
Dept: RESPIRATORY THERAPY | Facility: HOSPITAL | Age: 78
Discharge: HOME | End: 2023-11-06
Admitting: FAMILY MEDICINE
Payer: MEDICARE

## 2023-11-06 DIAGNOSIS — R00.2 PALPITATIONS: ICD-10-CM

## 2023-11-06 PROCEDURE — 93242 EXT ECG>48HR<7D RECORDING: CPT

## 2023-11-13 ENCOUNTER — HOSPITAL ENCOUNTER (EMERGENCY)
Facility: HOSPITAL | Age: 78
Discharge: HOME | End: 2023-11-13
Attending: EMERGENCY MEDICINE | Admitting: EMERGENCY MEDICINE
Payer: MEDICARE

## 2023-11-13 ENCOUNTER — APPOINTMENT (OUTPATIENT)
Dept: GENERAL RADIOLOGY | Facility: HOSPITAL | Age: 78
End: 2023-11-13
Payer: MEDICARE

## 2023-11-13 VITALS
TEMPERATURE: 97.9 F | HEIGHT: 65 IN | WEIGHT: 170.19 LBS | SYSTOLIC BLOOD PRESSURE: 112 MMHG | HEART RATE: 57 BPM | BODY MASS INDEX: 28.36 KG/M2 | RESPIRATION RATE: 18 BRPM | OXYGEN SATURATION: 99 % | DIASTOLIC BLOOD PRESSURE: 57 MMHG

## 2023-11-13 DIAGNOSIS — R07.81 RIB PAIN: ICD-10-CM

## 2023-11-13 DIAGNOSIS — M79.601 PAIN OF RIGHT UPPER EXTREMITY: ICD-10-CM

## 2023-11-13 DIAGNOSIS — S20.211A CONTUSION OF RIB ON RIGHT SIDE, INITIAL ENCOUNTER: ICD-10-CM

## 2023-11-13 DIAGNOSIS — S51.811A SKIN TEAR OF FOREARM WITHOUT COMPLICATION, RIGHT, INITIAL ENCOUNTER: ICD-10-CM
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
## 2023-11-13 PROCEDURE — 73070 X-RAY EXAM OF ELBOW: CPT

## 2023-11-13 PROCEDURE — 99283 EMERGENCY DEPT VISIT LOW MDM: CPT

## 2023-11-13 PROCEDURE — 71101 X-RAY EXAM UNILAT RIBS/CHEST: CPT

## 2023-11-13 PROCEDURE — 99282 EMERGENCY DEPT VISIT SF MDM: CPT

## 2023-11-14 NOTE — ED PROVIDER NOTES
Subjective   History of Present Illness    Review of Systems   Constitutional: Negative.    Respiratory: Negative.     Gastrointestinal:  Negative for abdominal pain, nausea and vomiting.   Skin:  Positive for wound.   Neurological:  Negative for dizziness, seizures, syncope, weakness, numbness and headaches.       Past Medical History:   Diagnosis Date    Acid reflux     Anemia     Bradycardia     History of loop recorder     approximately 2017 or thereabouts    Hyperlipidemia     Mobility poor     cane/walker PRN    Palpitations        Allergies   Allergen Reactions    Contrast Dye (Echo Or Unknown Ct/Mr) Unknown (See Comments)    Hydrocodone Unknown (See Comments)    Oxycodone Unknown (See Comments)    Sulfamethoxazole-Trimethoprim Hives    Cyclobenzaprine Other (See Comments)     Off balance    Loratadine Other (See Comments)    Tramadol Hcl Unknown (See Comments)       Past Surgical History:   Procedure Laterality Date    CARDIAC CATHETERIZATION      CARDIAC CATHETERIZATION N/A 9/2/2022    Procedure: Left and Right Heart Cath with Coronary Angiography;  Surgeon: Otis Patel MD;  Location: [facility] CATH INVASIVE LOCATION;  Service: Cardiovascular;  Laterality: N/A;       Family History   Problem Relation Age of Onset    Hypertension Mother     Asthma Mother        Social History     Socioeconomic History    Marital status:    Tobacco Use    Smoking status: Former     Passive exposure: Past    Smokeless tobacco: Never   Vaping Use    Vaping Use: Never used   Substance and Sexual Activity    Alcohol use: Yes     Alcohol/week: 7.0 standard drinks of alcohol     Types: 7 Glasses of wine per week     Comment: wine with dinner    Drug use: Never    Sexual activity: Defer           Objective   Physical Exam  Vitals and nursing note reviewed.   Constitutional:       General: She is not in acute distress.     Appearance: Normal appearance. She is well-developed. She is not ill-appearing, toxic-appearing or  diaphoretic.   HENT:      Head: Normocephalic and atraumatic.      Mouth/Throat:      Mouth: Mucous membranes are moist.      Pharynx: Oropharynx is clear.   Eyes:      General: No scleral icterus.     Extraocular Movements: Extraocular movements intact.      Pupils: Pupils are equal, round, and reactive to light.   Cardiovascular:      Rate and Rhythm: Normal rate and regular rhythm.      Pulses: Normal pulses.      Heart sounds: No murmur heard.     No friction rub. No gallop.   Pulmonary:      Effort: Pulmonary effort is normal. No respiratory distress.      Breath sounds: Normal breath sounds. No stridor. No wheezing, rhonchi or rales.   Chest:      Chest wall: No tenderness.      Comments: Tenderness to palpation along the lateral chest wall  Abdominal:      General: Bowel sounds are normal. There is no distension. There are no signs of injury.      Palpations: Abdomen is soft.      Tenderness: There is no abdominal tenderness. There is no guarding or rebound.   Musculoskeletal:      Right shoulder: Normal.      Right upper arm: No bony tenderness.      Right elbow: Normal range of motion. No tenderness.      Right forearm: No bony tenderness.      Right wrist: Normal.      Right hand: Normal.        Arms:       Cervical back: No bony tenderness.      Thoracic back: No bony tenderness.      Lumbar back: No bony tenderness.   Skin:     General: Skin is warm.      Capillary Refill: Capillary refill takes less than 2 seconds.      Coloration: Skin is not cyanotic, jaundiced or pale.      Findings: No rash.   Neurological:      General: No focal deficit present.      Mental Status: She is alert and oriented to person, place, and time.      GCS: GCS eye subscore is 4. GCS verbal subscore is 5. GCS motor subscore is 6.   Psychiatric:         Mood and Affect: Mood normal.         Behavior: Behavior normal.         Procedures           ED Course    /57   Pulse 57   Temp 97.9 °F (36.6 °C) (Oral)   Resp 18   Ht  "165.1 cm (65\")   Wt 77.2 kg (170 lb 3.1 oz)   SpO2 99%   BMI 28.32 kg/m²   Medications - No data to display  Labs Reviewed - No data to display  XR Ribs Right With PA Chest   Final Result   Impression:   No acute displaced rib fracture. Clear lungs.         Electronically Signed: Derrick Hercules MD     11/13/2023 7:54 PM EST     Workstation ID: [id number]      XR Elbow 2 View Right   Final Result   Impression:   No evidence of acute fracture or malalignment.         Electronically Signed: Hoang Olivia MD     11/13/2023 7:56 PM EST     Workstation ID: [id number]                                               Medical Decision Making  Chart Review: Progress note reviewed with cardiology from 5/9/2023 follow-up from recent hospitalization    Comorbidity: As per past medical history  Differentials: Fracture, contusion, skin tear, abrasion, pneumothorax     ;this list is not all inclusive and does not constitute the entirety of considered causes  Radiology: My interpretation shows no obvious fracture correlated with radiologist interpretation as below  XR Ribs Right With PA Chest   Final Result    Impression:    No acute displaced rib fracture. Clear lungs.            Electronically Signed: Derrick Hercules MD      11/13/2023 7:54 PM EST      Workstation ID: [id number]     XR Elbow 2 View Right   Final Result    Impression:    No evidence of acute fracture or malalignment.        Electronically Signed: Hoang Olivia MD      11/13/2023 7:56 PM EST      Workstation ID: [id number]     Disposition/Treatment:  Appropriate PPE was worn during exam and throughout all encounters with the patient.  When the ED patient was afebrile and appeared nontoxic presented after mechanical fall no reports of loss of consciousness lightheadedness or dizziness to cause the fall.  She did have 3 skin tears noted on her right elbow/forearm that were cleaned and Steri-Strips were applied while in the ED.  Patient tolerated well.  No need for " additional laceration repair including sutures.  Images were obtained which showed no acute osseous abnormalities or acute cardiopulmonary abnormalities as above.  Findings were discussed with the patient and family at bedside who voiced understanding of discharge along with signs and symptoms to return.  She declined pain medicine throughout her ED stay.  She was in agreement with plan of discharge all questions were answered at bedside.     This document is intended for medical expert use only. Reading of this document by patients and/or patient's family without participating medical staff guidance may result in misinterpretation and unintended morbidity.  Any interpretation of such data is the responsibility of the patient and/or family member responsible for the patient in concert with their primary or specialist providers, not to be left for sources of online searches such as Twined, Breather or similar queries. Relying on these approaches to knowledge may result in misinterpretation, misguided goals of care and even death should patients or family members try recommendations outside of the realm of professional medical care in a supervised inpatient environment.       Amount and/or Complexity of Data Reviewed  Radiology: ordered.        Final diagnoses:   Fall, initial encounter   Contusion of rib on right side, initial encounter   Rib pain   Pain of right upper extremity   Skin tear of forearm without complication, right, initial encounter       ED Disposition  ED Disposition       ED Disposition   Discharge    Condition   Stable    Comment   --               Pedro Rivera MD  [address] [place] [address] [phone]    Schedule an appointment as soon as possible for a visit in 3 days      [facility] EMERGENCY DEPARTMENT  [address] [place] [id number] [phone]  Go to   If symptoms worsen         Medication List      No changes were made to your prescriptions  during this visit.            Damaris Case PA  11/13/23 2029

## 2023-11-14 NOTE — DISCHARGE INSTRUCTIONS
Clean daily with soap and water.  Pat dry.  Leave Steri-Strips on for 10 days.  If they have not fallen off after 10 days you may use a small amount of Vaseline to help remove them.  You do NOT need to add any antibiotic ointment over the Steri-Strips.    Follow-up with your primary care provider in 3-5 days.  If you do not have a primary care provider call [phone] for help in finding one, or you may follow up with [facility] at [phone].    Return to ED for any new or worsening symptoms.    Tylenol as needed for pain.

## 2023-11-21 ENCOUNTER — APPOINTMENT (OUTPATIENT)
Dept: GENERAL RADIOLOGY | Facility: HOSPITAL | Age: 78
End: 2023-11-21
Payer: MEDICARE

## 2023-11-21 ENCOUNTER — HOSPITAL ENCOUNTER (OUTPATIENT)
Facility: HOSPITAL | Age: 78
Setting detail: OBSERVATION
Discharge: HOME | End: 2023-11-22
Attending: EMERGENCY MEDICINE | Admitting: EMERGENCY MEDICINE
Payer: MEDICARE

## 2023-11-21 DIAGNOSIS — R00.0 TACHYCARDIA: Primary | ICD-10-CM

## 2023-11-21 DIAGNOSIS — R00.2 PALPITATION: ICD-10-CM

## 2023-11-21 LAB
ALBUMIN SERPL-MCNC: 4.4 G/DL (ref 3.5–5.2)
ALBUMIN/GLOB SERPL: 1.5 G/DL
ALP SERPL-CCNC: 67 U/L (ref 39–117)
ALT SERPL W P-5'-P-CCNC: 14 U/L (ref 1–33)
ANION GAP SERPL CALCULATED.3IONS-SCNC: 15 MMOL/L (ref 5–15)
AST SERPL-CCNC: 20 U/L (ref 1–32)
BASOPHILS # BLD AUTO: 0.1 10*3/MM3 (ref 0–0.2)
BASOPHILS NFR BLD AUTO: 1.2 % (ref 0–1.5)
BILIRUB SERPL-MCNC: 0.3 MG/DL (ref 0–1.2)
BUN SERPL-MCNC: 13 MG/DL (ref 8–23)
BUN/CREAT SERPL: 17.1 (ref 7–25)
CALCIUM SPEC-SCNC: 9.8 MG/DL (ref 8.6–10.5)
CHLORIDE SERPL-SCNC: 102 MMOL/L (ref 98–107)
CO2 SERPL-SCNC: 23 MMOL/L (ref 22–29)
CREAT SERPL-MCNC: 0.76 MG/DL (ref 0.57–1)
DEPRECATED RDW RBC AUTO: 43.3 FL (ref 37–54)
EGFRCR SERPLBLD CKD-EPI 2021: 79.3 ML/MIN/1.73
EOSINOPHIL # BLD AUTO: 0.5 10*3/MM3 (ref 0–0.4)
EOSINOPHIL NFR BLD AUTO: 7.1 % (ref 0.3–6.2)
ERYTHROCYTE [DISTWIDTH] IN BLOOD BY AUTOMATED COUNT: 13.9 % (ref 12.3–15.4)
GLOBULIN UR ELPH-MCNC: 3 GM/DL
GLUCOSE SERPL-MCNC: 122 MG/DL (ref 65–99)
HCT VFR BLD AUTO: 40.9 % (ref 34–46.6)
HGB BLD-MCNC: 13.6 G/DL (ref 12–15.9)
HOLD SPECIMEN: NORMAL
INR PPP: 0.94 (ref 0.93–1.1)
LYMPHOCYTES # BLD AUTO: 2.2 10*3/MM3 (ref 0.7–3.1)
LYMPHOCYTES NFR BLD AUTO: 30.6 % (ref 19.6–45.3)
MCH RBC QN AUTO: 30 PG (ref 26.6–33)
MCHC RBC AUTO-ENTMCNC: 33.1 G/DL (ref 31.5–35.7)
MCV RBC AUTO: 90.7 FL (ref 79–97)
MONOCYTES # BLD AUTO: 0.6 10*3/MM3 (ref 0.1–0.9)
MONOCYTES NFR BLD AUTO: 9.1 % (ref 5–12)
NEUTROPHILS NFR BLD AUTO: 3.7 10*3/MM3 (ref 1.7–7)
NEUTROPHILS NFR BLD AUTO: 52 % (ref 42.7–76)
NRBC BLD AUTO-RTO: 0 /100 WBC (ref 0–0.2)
NT-PROBNP SERPL-MCNC: 171.6 PG/ML (ref 0–1800)
PLATELET # BLD AUTO: 230 10*3/MM3 (ref 140–450)
PMV BLD AUTO: 7.7 FL (ref 6–12)
POTASSIUM SERPL-SCNC: 4 MMOL/L (ref 3.5–5.2)
PROT SERPL-MCNC: 7.4 G/DL (ref 6–8.5)
PROTHROMBIN TIME: 10.3 SECONDS (ref 9.6–11.7)
QT INTERVAL: 409 MS
QTC INTERVAL: 481 MS
RBC # BLD AUTO: 4.52 10*6/MM3 (ref 3.77–5.28)
SODIUM SERPL-SCNC: 140 MMOL/L (ref 136–145)
TROPONIN T SERPL HS-MCNC: 9 NG/L
WBC NRBC COR # BLD AUTO: 7.1 10*3/MM3 (ref 3.4–10.8)
WHOLE BLOOD HOLD COAG: NORMAL
WHOLE BLOOD HOLD SPECIMEN: NORMAL

## 2023-11-21 PROCEDURE — 99284 EMERGENCY DEPT VISIT MOD MDM: CPT

## 2023-11-21 PROCEDURE — 85610 PROTHROMBIN TIME: CPT | Performed by: PHYSICIAN ASSISTANT

## 2023-11-21 PROCEDURE — 93005 ELECTROCARDIOGRAM TRACING: CPT | Performed by: PHYSICIAN ASSISTANT

## 2023-11-21 PROCEDURE — 25810000003 SODIUM CHLORIDE 0.9 % SOLUTION: Performed by: PHYSICIAN ASSISTANT

## 2023-11-21 PROCEDURE — 96374 THER/PROPH/DIAG INJ IV PUSH: CPT

## 2023-11-21 PROCEDURE — 93005 ELECTROCARDIOGRAM TRACING: CPT

## 2023-11-21 PROCEDURE — G0378 HOSPITAL OBSERVATION PER HR: HCPCS

## 2023-11-21 PROCEDURE — 84484 ASSAY OF TROPONIN QUANT: CPT | Performed by: EMERGENCY MEDICINE

## 2023-11-21 PROCEDURE — 71045 X-RAY EXAM CHEST 1 VIEW: CPT

## 2023-11-21 PROCEDURE — 83880 ASSAY OF NATRIURETIC PEPTIDE: CPT | Performed by: PHYSICIAN ASSISTANT

## 2023-11-21 PROCEDURE — 85025 COMPLETE CBC W/AUTO DIFF WBC: CPT | Performed by: PHYSICIAN ASSISTANT

## 2023-11-21 PROCEDURE — 93005 ELECTROCARDIOGRAM TRACING: CPT | Performed by: EMERGENCY MEDICINE

## 2023-11-21 PROCEDURE — 80053 COMPREHEN METABOLIC PANEL: CPT | Performed by: PHYSICIAN ASSISTANT

## 2023-11-21 RX ORDER — SODIUM CHLORIDE 0.9 % (FLUSH) 0.9 %
10 SYRINGE (ML) INJECTION AS NEEDED
Status: DISCONTINUED | OUTPATIENT
Start: 2023-11-21 | End: 2023-11-22 | Stop reason: HOSPADM

## 2023-11-21 RX ORDER — METOPROLOL TARTRATE 1 MG/ML
5 INJECTION, SOLUTION INTRAVENOUS ONCE
Status: COMPLETED | OUTPATIENT
Start: 2023-11-21 | End: 2023-11-21

## 2023-11-21 RX ADMIN — METOPROLOL TARTRATE 5 MG: 1 INJECTION, SOLUTION INTRAVENOUS at 22:24

## 2023-11-21 RX ADMIN — SODIUM CHLORIDE 500 ML: 9 INJECTION, SOLUTION INTRAVENOUS at 22:28

## 2023-11-22 ENCOUNTER — PREP FOR SURGERY (OUTPATIENT)
Dept: OTHER | Facility: HOSPITAL | Age: 78
End: 2023-11-22
Payer: MEDICARE

## 2023-11-22 VITALS
HEART RATE: 66 BPM | BODY MASS INDEX: 28.69 KG/M2 | RESPIRATION RATE: 16 BRPM | DIASTOLIC BLOOD PRESSURE: 57 MMHG | TEMPERATURE: 97.9 F | OXYGEN SATURATION: 96 % | WEIGHT: 172.18 LBS | SYSTOLIC BLOOD PRESSURE: 100 MMHG | HEIGHT: 65 IN

## 2023-11-22 DIAGNOSIS — R00.2 PALPITATION: ICD-10-CM

## 2023-11-22 DIAGNOSIS — I48.3 TYPICAL ATRIAL FLUTTER: ICD-10-CM

## 2023-11-22 DIAGNOSIS — I48.92 ATRIAL FLUTTER WITH RAPID VENTRICULAR RESPONSE: ICD-10-CM

## 2023-11-22 DIAGNOSIS — R00.0 TACHYCARDIA: Primary | ICD-10-CM

## 2023-11-22 LAB
ANION GAP SERPL CALCULATED.3IONS-SCNC: 9 MMOL/L (ref 5–15)
BASOPHILS # BLD AUTO: 0.1 10*3/MM3 (ref 0–0.2)
BASOPHILS NFR BLD AUTO: 0.7 % (ref 0–1.5)
BUN SERPL-MCNC: 14 MG/DL (ref 8–23)
BUN/CREAT SERPL: 18.4 (ref 7–25)
CALCIUM SPEC-SCNC: 8.9 MG/DL (ref 8.6–10.5)
CHLORIDE SERPL-SCNC: 104 MMOL/L (ref 98–107)
CO2 SERPL-SCNC: 28 MMOL/L (ref 22–29)
CREAT SERPL-MCNC: 0.76 MG/DL (ref 0.57–1)
DEPRECATED RDW RBC AUTO: 44.2 FL (ref 37–54)
EGFRCR SERPLBLD CKD-EPI 2021: 79.3 ML/MIN/1.73
EOSINOPHIL # BLD AUTO: 0.4 10*3/MM3 (ref 0–0.4)
EOSINOPHIL NFR BLD AUTO: 6.3 % (ref 0.3–6.2)
ERYTHROCYTE [DISTWIDTH] IN BLOOD BY AUTOMATED COUNT: 13.6 % (ref 12.3–15.4)
GEN 5 2HR TROPONIN T REFLEX: 13 NG/L
GLUCOSE SERPL-MCNC: 105 MG/DL (ref 65–99)
HCT VFR BLD AUTO: 34.2 % (ref 34–46.6)
HGB BLD-MCNC: 11.5 G/DL (ref 12–15.9)
LYMPHOCYTES # BLD AUTO: 2.4 10*3/MM3 (ref 0.7–3.1)
LYMPHOCYTES NFR BLD AUTO: 35.8 % (ref 19.6–45.3)
MCH RBC QN AUTO: 29.9 PG (ref 26.6–33)
MCHC RBC AUTO-ENTMCNC: 33.7 G/DL (ref 31.5–35.7)
MCV RBC AUTO: 88.7 FL (ref 79–97)
MONOCYTES # BLD AUTO: 0.7 10*3/MM3 (ref 0.1–0.9)
MONOCYTES NFR BLD AUTO: 10.3 % (ref 5–12)
NEUTROPHILS NFR BLD AUTO: 3.2 10*3/MM3 (ref 1.7–7)
NEUTROPHILS NFR BLD AUTO: 46.9 % (ref 42.7–76)
NRBC BLD AUTO-RTO: 0 /100 WBC (ref 0–0.2)
PLATELET # BLD AUTO: 193 10*3/MM3 (ref 140–450)
PMV BLD AUTO: 7.7 FL (ref 6–12)
POTASSIUM SERPL-SCNC: 3.9 MMOL/L (ref 3.5–5.2)
QT INTERVAL: 311 MS
QTC INTERVAL: 463 MS
RBC # BLD AUTO: 3.86 10*6/MM3 (ref 3.77–5.28)
SODIUM SERPL-SCNC: 141 MMOL/L (ref 136–145)
TROPONIN T DELTA: 4 NG/L
WBC NRBC COR # BLD AUTO: 6.8 10*3/MM3 (ref 3.4–10.8)

## 2023-11-22 PROCEDURE — G0378 HOSPITAL OBSERVATION PER HR: HCPCS

## 2023-11-22 PROCEDURE — 84484 ASSAY OF TROPONIN QUANT: CPT | Performed by: EMERGENCY MEDICINE

## 2023-11-22 PROCEDURE — 85025 COMPLETE CBC W/AUTO DIFF WBC: CPT | Performed by: PHYSICIAN ASSISTANT

## 2023-11-22 PROCEDURE — 80048 BASIC METABOLIC PNL TOTAL CA: CPT | Performed by: PHYSICIAN ASSISTANT

## 2023-11-22 PROCEDURE — 99204 OFFICE O/P NEW MOD 45 MIN: CPT | Performed by: INTERNAL MEDICINE

## 2023-11-22 RX ORDER — ONDANSETRON 2 MG/ML
4 INJECTION INTRAMUSCULAR; INTRAVENOUS EVERY 6 HOURS PRN
Status: DISCONTINUED | OUTPATIENT
Start: 2023-11-22 | End: 2023-11-22 | Stop reason: HOSPADM

## 2023-11-22 RX ORDER — RANOLAZINE 500 MG/1
500 TABLET, EXTENDED RELEASE ORAL 2 TIMES DAILY
Status: DISCONTINUED | OUTPATIENT
Start: 2023-11-22 | End: 2023-11-22 | Stop reason: HOSPADM

## 2023-11-22 RX ORDER — POLYETHYLENE GLYCOL 3350 17 G/17G
17 POWDER, FOR SOLUTION ORAL DAILY PRN
Status: DISCONTINUED | OUTPATIENT
Start: 2023-11-22 | End: 2023-11-22 | Stop reason: HOSPADM

## 2023-11-22 RX ORDER — SODIUM CHLORIDE 9 MG/ML
40 INJECTION, SOLUTION INTRAVENOUS AS NEEDED
OUTPATIENT
Start: 2023-11-22

## 2023-11-22 RX ORDER — BISACODYL 10 MG
10 SUPPOSITORY, RECTAL RECTAL DAILY PRN
Status: DISCONTINUED | OUTPATIENT
Start: 2023-11-22 | End: 2023-11-22 | Stop reason: HOSPADM

## 2023-11-22 RX ORDER — SODIUM CHLORIDE 0.9 % (FLUSH) 0.9 %
3 SYRINGE (ML) INJECTION EVERY 12 HOURS SCHEDULED
OUTPATIENT
Start: 2023-11-22

## 2023-11-22 RX ORDER — CHOLECALCIFEROL (VITAMIN D3) 125 MCG
5 CAPSULE ORAL NIGHTLY PRN
Status: DISCONTINUED | OUTPATIENT
Start: 2023-11-22 | End: 2023-11-22 | Stop reason: HOSPADM

## 2023-11-22 RX ORDER — PREDNISONE 20 MG/1
20 TABLET ORAL TAKE AS DIRECTED
Qty: 2 TABLET | Refills: 0 | Status: SHIPPED | OUTPATIENT
Start: 2023-11-22

## 2023-11-22 RX ORDER — SODIUM CHLORIDE 0.9 % (FLUSH) 0.9 %
3-10 SYRINGE (ML) INJECTION AS NEEDED
OUTPATIENT
Start: 2023-11-22

## 2023-11-22 RX ORDER — MONTELUKAST SODIUM 10 MG/1
10 TABLET ORAL NIGHTLY
Status: DISCONTINUED | OUTPATIENT
Start: 2023-11-22 | End: 2023-11-22 | Stop reason: HOSPADM

## 2023-11-22 RX ORDER — AMOXICILLIN 250 MG
2 CAPSULE ORAL 2 TIMES DAILY
Status: DISCONTINUED | OUTPATIENT
Start: 2023-11-22 | End: 2023-11-22 | Stop reason: HOSPADM

## 2023-11-22 RX ORDER — BISACODYL 5 MG/1
5 TABLET, DELAYED RELEASE ORAL DAILY PRN
Status: DISCONTINUED | OUTPATIENT
Start: 2023-11-22 | End: 2023-11-22 | Stop reason: HOSPADM

## 2023-11-22 RX ORDER — ACETAMINOPHEN 325 MG/1
650 TABLET ORAL EVERY 4 HOURS PRN
Status: DISCONTINUED | OUTPATIENT
Start: 2023-11-22 | End: 2023-11-22 | Stop reason: HOSPADM

## 2023-11-22 RX ORDER — SODIUM CHLORIDE 9 MG/ML
80 INJECTION, SOLUTION INTRAVENOUS CONTINUOUS
OUTPATIENT
Start: 2023-11-22

## 2023-11-22 RX ORDER — ASPIRIN 81 MG/1
81 TABLET ORAL NIGHTLY
Status: DISCONTINUED | OUTPATIENT
Start: 2023-11-22 | End: 2023-11-22

## 2023-11-22 RX ORDER — NITROGLYCERIN 0.4 MG/1
0.4 TABLET SUBLINGUAL
Status: DISCONTINUED | OUTPATIENT
Start: 2023-11-22 | End: 2023-11-22 | Stop reason: HOSPADM

## 2023-11-22 RX ADMIN — APIXABAN 5 MG: 5 TABLET, FILM COATED ORAL at 08:53

## 2023-11-22 RX ADMIN — DOCUSATE SODIUM 50MG AND SENNOSIDES 8.6MG 2 TABLET: 8.6; 5 TABLET, FILM COATED ORAL at 08:54

## 2023-11-22 RX ADMIN — RANOLAZINE 500 MG: 500 TABLET, EXTENDED RELEASE ORAL at 08:53

## 2023-11-22 NOTE — CONSULTS
CC--- intermittent  atrial arrhythmias    Sub  78-year-old pleasant patient started experiencing sudden palpitations yesterday.  Associated with some mild dyspnea and lightheadedness and came to the hospital.  Patient was given Lopressor with resolution of symptoms and overnight observation was done by consultation requested.  She denies any chest pain or any symptoms at this point.  Prior work-up with Holter revealed nonsustained atrial tachycardia.  Echocardiography with normal EF and cardiac catheterization without any significant coronary artery stenosis.  TSH is normal in the past.    With history of acid reflux and history of anemia and denies any history of stroke  Denies any bleeding diathesis and she has remote history of loop recorder with battery depletion.      Past Medical History:   Diagnosis Date    Acid reflux     Anemia     Bradycardia     History of loop recorder     approximately 2017 or thereabouts    Hyperlipidemia     Mobility poor     cane/walker PRN    Palpitations      Past Surgical History:   Procedure Laterality Date    CARDIAC CATHETERIZATION      CARDIAC CATHETERIZATION N/A 9/2/2022    Procedure: Left and Right Heart Cath with Coronary Angiography;  Surgeon: Otis Patel MD;  Location: [facility] CATH INVASIVE LOCATION;  Service: Cardiovascular;  Laterality: N/A;     Family History   Problem Relation Age of Onset    Hypertension Mother     Asthma Mother      Social History     Tobacco Use    Smoking status: Former     Passive exposure: Past    Smokeless tobacco: Never   Vaping Use    Vaping Use: Never used   Substance Use Topics    Alcohol use: Yes     Alcohol/week: 7.0 standard drinks of alcohol     Types: 7 Glasses of wine per week     Comment: wine with dinner    Drug use: Never     Medications Prior to Admission   Medication Sig Dispense Refill Last Dose    aspirin (aspirin) 81 MG EC tablet Take 1 tablet by mouth Every Night.   11/21/2023    calcium carbonate (OS-RAVEN) 600 MG tablet  Take 1 tablet by mouth 2 (Two) Times a Day With Meals.   11/21/2023    cholecalciferol (VITAMIN D3) 1000 units tablet Take 1 tablet by mouth Daily.   11/21/2023    citalopram (CeleXA) 10 MG tablet 1 tablet Daily.   11/21/2023    melatonin 1 MG tablet Take 3 tablets by mouth At Night As Needed for Sleep.   11/21/2023    montelukast (SINGULAIR) 10 MG tablet Take 1 tablet by mouth Every Night.   11/21/2023    omeprazole (priLOSEC) 40 MG capsule Take 1 capsule by mouth Daily.   11/21/2023    propranolol LA (INDERAL LA) 120 MG 24 hr capsule TAKE 1 CAPSULE EVERY       MORNING 90 capsule 3 11/21/2023    ranolazine (RANEXA) 500 MG 12 hr tablet Take 1 tablet by mouth 2 (Two) Times a Day.   11/21/2023    RESTASIS 0.05 % ophthalmic emulsion Administer 1 drop to both eyes Every 12 (Twelve) Hours.   11/21/2023    diphenhydrAMINE (BENADRYL) 25 mg capsule Take 1 capsule by mouth Every 6 (Six) Hours As Needed for Itching.   Unknown    Polyvinyl Alcohol-Povidone PF (HYPOTEARS) 1.4-0.6 % ophthalmic solution Administer 1-2 drops to both eyes As Needed for Wound Care.   Unknown    SUMAtriptan (IMITREX) 50 MG tablet Take 1 tablet by mouth As Needed for Migraine.   Unknown     Allergies:  Contrast dye (echo or unknown ct/mr), Hydrocodone, Oxycodone, Sulfamethoxazole-trimethoprim, Cyclobenzaprine, Loratadine, and Tramadol hcl    Review of Systems   General:  positive for fatigue and tiredness  Eyes: No redness  Cardiovascular: No chest pain, no palpitations      Physical Exam    General:      well developed, well nourished, in no acute distress.    Head:      normocephalic and atraumatic.    Eyes:      PERRL/EOM intact, conjunctivae and sclerae clear without nystagmus.    Neck:      no  thyromegaly, trachea central with normal respiratory effort  Lungs:      clear bilaterally to auscultation.    Heart:       regular rate and rhythm, S1, S2 without murmurs, rubs, or gallops  Skin:      intact without lesions or rashes.    Psych:      alert  and cooperative; normal mood and affect; normal attention span and concentration.          CBC    Results from last 7 days   Lab Units 11/22/23  0112 11/21/23 2215   WBC 10*3/mm3 6.80 7.10   HEMOGLOBIN g/dL 11.5* 13.6   PLATELETS 10*3/mm3 193 230     BMP   Results from last 7 days   Lab Units 11/22/23  0112 11/21/23 2215   SODIUM mmol/L 141 140   POTASSIUM mmol/L 3.9 4.0   CHLORIDE mmol/L 104 102   CO2 mmol/L 28.0 23.0   BUN mg/dL 14 13   CREATININE mg/dL 0.76 0.76   GLUCOSE mg/dL 105* 122*     Radiology(recent) XR Chest 1 View    Result Date: 11/21/2023  Impression: No acute abnormality Electronically Signed: Anmol Goncalves DO  11/21/2023 10:38 PM EST  Workstation ID: [id number]                 Assessment plan    Intermittent symptoms of palpitations with sustained arrhythmia and was noted to be in atrial flutter and converted to sinus rhythm  Stop aspirin  Start Eliquis 5 mg twice daily  Patient educated about options for arrhythmia and patient to be scheduled for an outpatient EP study and ablation and orders are placed  Right bundle branch block  Normal EF without ischemia  Normal TSH  Prior cardiac catheterization without stenosis  History of loop recorder with battery depletion      Discussed with the discharging team and the patient    Electronically signed by Branden Hernandez MD, 11/22/23, 8:42 AM EST.

## 2023-11-22 NOTE — DISCHARGE SUMMARY
[facility]    Pedro Rivera MD    CHIEF COMPLAINT:     Palpitations    HISTORY OF PRESENT ILLNESS:    Obtained from ED provider HPI on 11/21/2023:  Patient is a 78-year-old female presenting to the ED with complaints of palpitations that started around 8 PM this evening when she was watching TV.  She reports some slight chest discomfort dyspnea and lightheadedness.  She denies any recent travel, surgeries, or immobilization.  No unilateral leg swelling.  Patient states she has had similar symptoms in the past and recently had a Holter monitor that was unremarkable.  She denies any cough congestion fever or chills.  No abdominal pain nausea or vomiting.  Patient denies being on any blood thinners.  Does report taking propanolol daily    11/22/23:  Patient confirms the HPI noted above including palpitations which are short in duration but occurring with increasing frequency with some dyspnea noted when symptoms are present.  She had a recurrent episode the previous evening and noted some mild chest discomfort at that time but has had no further episodes since her admission.  She denies any nausea, vomiting or peripheral edema.  No cough or fevers been present.  She is compliant with her nighttime oxygen.            Past Medical History:   Diagnosis Date    Acid reflux     Anemia     Bradycardia     History of loop recorder     approximately 2017 or thereabouts    Hyperlipidemia     Mobility poor     cane/walker PRN    Palpitations      Past Surgical History:   Procedure Laterality Date    CARDIAC CATHETERIZATION      CARDIAC CATHETERIZATION N/A 9/2/2022    Procedure: Left and Right Heart Cath with Coronary Angiography;  Surgeon: Otis Patel MD;  Location: [facility] CATH INVASIVE LOCATION;  Service: Cardiovascular;  Laterality: N/A;     Family History   Problem Relation Age of Onset    Hypertension Mother     Asthma Mother      Social History     Tobacco Use    Smoking status: Former      Passive exposure: Past    Smokeless tobacco: Never   Vaping Use    Vaping Use: Never used   Substance Use Topics    Alcohol use: Yes     Alcohol/week: 7.0 standard drinks of alcohol     Types: 7 Glasses of wine per week     Comment: wine with dinner    Drug use: Never     Medications Prior to Admission   Medication Sig Dispense Refill Last Dose    aspirin (aspirin) 81 MG EC tablet Take 1 tablet by mouth Every Night.   11/21/2023    calcium carbonate (OS-RAVEN) 600 MG tablet Take 1 tablet by mouth 2 (Two) Times a Day With Meals.   11/21/2023    cholecalciferol (VITAMIN D3) 1000 units tablet Take 1 tablet by mouth Daily.   11/21/2023    citalopram (CeleXA) 10 MG tablet 1 tablet Daily.   11/21/2023    melatonin 1 MG tablet Take 3 tablets by mouth At Night As Needed for Sleep.   11/21/2023    montelukast (SINGULAIR) 10 MG tablet Take 1 tablet by mouth Every Night.   11/21/2023    omeprazole (priLOSEC) 40 MG capsule Take 1 capsule by mouth Daily.   11/21/2023    propranolol LA (INDERAL LA) 120 MG 24 hr capsule TAKE 1 CAPSULE EVERY       MORNING 90 capsule 3 11/21/2023    ranolazine (RANEXA) 500 MG 12 hr tablet Take 1 tablet by mouth 2 (Two) Times a Day.   11/21/2023    RESTASIS 0.05 % ophthalmic emulsion Administer 1 drop to both eyes Every 12 (Twelve) Hours.   11/21/2023    diphenhydrAMINE (BENADRYL) 25 mg capsule Take 1 capsule by mouth Every 6 (Six) Hours As Needed for Itching.   Unknown    Polyvinyl Alcohol-Povidone PF (HYPOTEARS) 1.4-0.6 % ophthalmic solution Administer 1-2 drops to both eyes As Needed for Wound Care.   Unknown    SUMAtriptan (IMITREX) 50 MG tablet Take 1 tablet by mouth As Needed for Migraine.   Unknown     Allergies:  Contrast dye (echo or unknown ct/mr), Hydrocodone, Oxycodone, Sulfamethoxazole-trimethoprim, Cyclobenzaprine, Loratadine, and Tramadol hcl    Immunization History   Administered Date(s) Administered    Tdap 09/07/2021           REVIEW OF SYSTEMS:    Review of Systems   Constitutional:  Negative.   HENT: Negative.     Eyes: Negative.    Cardiovascular:  Positive for chest pain and palpitations.   Respiratory:  Positive for shortness of breath.    Skin: Negative.    Musculoskeletal: Negative.    Gastrointestinal: Negative.    Genitourinary: Negative.    Neurological:  Positive for light-headedness.   Psychiatric/Behavioral: Negative.       Vital Signs  Temp:  [98.2 °F (36.8 °C)-98.9 °F (37.2 °C)] 98.2 °F (36.8 °C)  Heart Rate:  [] 67  Resp:  [15-17] 15  BP: (106-184)/(61-88) 117/62       Physical Exam:  Physical Exam  Constitutional:       General: She is not in acute distress.     Appearance: Normal appearance. She is not ill-appearing, toxic-appearing or diaphoretic.   HENT:      Head: Normocephalic.      Right Ear: External ear normal.      Left Ear: External ear normal.      Nose: Nose normal.      Mouth/Throat:      Mouth: Mucous membranes are moist.   Eyes:      Extraocular Movements: Extraocular movements intact.      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      Pulses: Normal pulses.   Pulmonary:      Effort: Pulmonary effort is normal.      Breath sounds: Normal breath sounds.   Abdominal:      General: Bowel sounds are normal.      Palpations: Abdomen is soft.   Musculoskeletal:      Cervical back: Normal range of motion.      Right lower leg: No edema.      Left lower leg: No edema.   Skin:     General: Skin is warm and dry.      Capillary Refill: Capillary refill takes less than 2 seconds.   Neurological:      General: No focal deficit present.      Mental Status: She is alert.   Psychiatric:         Mood and Affect: Mood normal.         Behavior: Behavior normal.         Thought Content: Thought content normal.         Judgment: Judgment normal.           Emotional Behavior:   Normal   Debilities:  None  Results Review:    I reviewed the patient's new clinical  results.  Lab Results (most recent)       Procedure Component Value Units Date/Time    High Sensitivity Troponin T 2Hr [[id number]]  (Abnormal) Collected: 11/22/23 0112    Specimen: Blood from Arm, Left Updated: 11/22/23 0151     HS Troponin T 13 ng/L      Troponin T Delta 4 ng/L     Narrative:      High Sensitive Troponin T Reference Range:  <14.0 ng/L- Negative Female for AMI  <22.0 ng/L- Negative Male for AMI  >=14 - Abnormal Female indicating possible myocardial injury.  >=22 - Abnormal Male indicating possible myocardial injury.   Clinicians would have to utilize clinical acumen, EKG, Troponin, and serial changes to determine if it is an Acute Myocardial Infarction or myocardial injury due to an underlying chronic condition.         Basic Metabolic Panel [[id number]]  (Abnormal) Collected: 11/22/23 0112    Specimen: Blood from Arm, Left Updated: 11/22/23 0150     Glucose 105 mg/dL      BUN 14 mg/dL      Creatinine 0.76 mg/dL      Sodium 141 mmol/L      Potassium 3.9 mmol/L      Chloride 104 mmol/L      CO2 28.0 mmol/L      Calcium 8.9 mg/dL      BUN/Creatinine Ratio 18.4     Anion Gap 9.0 mmol/L      eGFR 79.3 mL/min/1.73     Narrative:      GFR Normal >60  Chronic Kidney Disease <60  Kidney Failure <15    The GFR formula is only valid for adults with stable renal function between ages 18 and 70.    CBC Auto Differential [[id number]]  (Abnormal) Collected: 11/22/23 0112    Specimen: Blood from Arm, Left Updated: 11/22/23 0144     WBC 6.80 10*3/mm3      RBC 3.86 10*6/mm3      Hemoglobin 11.5 g/dL      Hematocrit 34.2 %      MCV 88.7 fL      MCH 29.9 pg      MCHC 33.7 g/dL      RDW 13.6 %      RDW-SD 44.2 fl      MPV 7.7 fL      Platelets 193 10*3/mm3      Neutrophil % 46.9 %      Lymphocyte % 35.8 %      Monocyte % 10.3 %      Eosinophil % 6.3 %      Basophil % 0.7 %      Neutrophils, Absolute 3.20 10*3/mm3      Lymphocytes, Absolute 2.40 10*3/mm3      Monocytes, Absolute 0.70 10*3/mm3      Eosinophils, Absolute  0.40 10*3/mm3      Basophils, Absolute 0.10 10*3/mm3      nRBC 0.0 /100 WBC     [place] Draw [[id number]] Collected: 11/21/23 2215    Specimen: Blood Updated: 11/21/23 2331    Narrative:      The following orders were created for panel order [place] Draw.  Procedure                               Abnormality         Status                     ---------                               -----------         ------                     Green Top (Gel)[[id number]]                                  Final result               Lavender Top[[id number]]                                     Final result               Gold Top - SST[[id number]]                                   Final result               Light Blue Top[[id number]]                                   Final result                 Please view results for these tests on the individual orders.    Lavender Top [[id number]] Collected: 11/21/23 2215    Specimen: Blood Updated: 11/21/23 2331     Extra Tube hold for add-on     Comment: Auto resulted       Gold Top - SST [[id number]] Collected: 11/21/23 2215    Specimen: Blood Updated: 11/21/23 2331     Extra Tube Hold for add-ons.     Comment: Auto resulted.       Light Blue Top [[id number]] Collected: 11/21/23 2215    Specimen: Blood Updated: 11/21/23 2331     Extra Tube Hold for add-ons.     Comment: Auto resulted       Green Top (Gel) [[id number]] Collected: 11/21/23 2215    Specimen: Blood Updated: 11/21/23 2311    Comprehensive Metabolic Panel [[id number]]  (Abnormal) Collected: 11/21/23 2215    Specimen: Blood Updated: 11/21/23 2253     Glucose 122 mg/dL      BUN 13 mg/dL      Creatinine 0.76 mg/dL      Sodium 140 mmol/L      Potassium 4.0 mmol/L      Comment: Slight hemolysis detected by analyzer. Result may be falsely elevated.        Chloride 102 mmol/L      CO2 23.0 mmol/L      Calcium 9.8 mg/dL      Total Protein 7.4 g/dL      Albumin 4.4 g/dL      ALT (SGPT) 14 U/L      AST (SGOT) 20 U/L      Comment: Slight hemolysis detected  by analyzer. Result may be falsely elevated.        Alkaline Phosphatase 67 U/L      Total Bilirubin 0.3 mg/dL      Globulin 3.0 gm/dL      A/G Ratio 1.5 g/dL      BUN/Creatinine Ratio 17.1     Anion Gap 15.0 mmol/L      eGFR 79.3 mL/min/1.73     Narrative:      GFR Normal >60  Chronic Kidney Disease <60  Kidney Failure <15    The GFR formula is only valid for adults with stable renal function between ages 18 and 70.    High Sensitivity Troponin T [[id number]]  (Normal) Collected: 11/21/23 2215    Specimen: Blood Updated: 11/21/23 2250     HS Troponin T 9 ng/L     Narrative:      High Sensitive Troponin T Reference Range:  <14.0 ng/L- Negative Female for AMI  <22.0 ng/L- Negative Male for AMI  >=14 - Abnormal Female indicating possible myocardial injury.  >=22 - Abnormal Male indicating possible myocardial injury.   Clinicians would have to utilize clinical acumen, EKG, Troponin, and serial changes to determine if it is an Acute Myocardial Infarction or myocardial injury due to an underlying chronic condition.         BNP [[id number]]  (Normal) Collected: 11/21/23 2215    Specimen: Blood Updated: 11/21/23 2250     proBNP 171.6 pg/mL     Narrative:      This assay is used as an aid in the diagnosis of individuals suspected of having heart failure. It can be used as an aid in the diagnosis of acute decompensated heart failure (ADHF) in patients presenting with signs and symptoms of ADHF to the emergency department (ED). In addition, NT-proBNP of <300 pg/mL indicates ADHF is not likely.    Age Range Result Interpretation  NT-proBNP Concentration (pg/mL:      <50             Positive            >450                   Gray                 300-450                    Negative             <300    50-75           Positive            >900                  Gray                300-900                  Negative            <300      >75             Positive            >1800                  Gray                300-1800                   Negative            <300    Protime-INR [[id number]]  (Normal) Collected: 11/21/23 2215    Specimen: Blood Updated: 11/21/23 2231     Protime 10.3 Seconds      INR 0.94    CBC & Differential [[id number]]  (Abnormal) Collected: 11/21/23 2215    Specimen: Blood Updated: 11/21/23 2220    Narrative:      The following orders were created for panel order CBC & Differential.  Procedure                               Abnormality         Status                     ---------                               -----------         ------                     CBC Auto Differential[[id number]]        Abnormal            Final result                 Please view results for these tests on the individual orders.    CBC Auto Differential [[id number]]  (Abnormal) Collected: 11/21/23 2215    Specimen: Blood Updated: 11/21/23 2220     WBC 7.10 10*3/mm3      RBC 4.52 10*6/mm3      Hemoglobin 13.6 g/dL      Hematocrit 40.9 %      MCV 90.7 fL      MCH 30.0 pg      MCHC 33.1 g/dL      RDW 13.9 %      RDW-SD 43.3 fl      MPV 7.7 fL      Platelets 230 10*3/mm3      Neutrophil % 52.0 %      Lymphocyte % 30.6 %      Monocyte % 9.1 %      Eosinophil % 7.1 %      Basophil % 1.2 %      Neutrophils, Absolute 3.70 10*3/mm3      Lymphocytes, Absolute 2.20 10*3/mm3      Monocytes, Absolute 0.60 10*3/mm3      Eosinophils, Absolute 0.50 10*3/mm3      Basophils, Absolute 0.10 10*3/mm3      nRBC 0.0 /100 WBC             Imaging Results (Most Recent)       Procedure Component Value Units Date/Time    XR Chest 1 View [[id number]] Collected: 11/21/23 2237     Updated: 11/21/23 2240    Narrative:      XR CHEST 1 VW    Date of Exam: 11/21/2023 10:33 PM EST    Indication: Chest Pain Protocol  Chest Pain Protocol    Comparison: 11/13/2023    Findings:  Lines: None    Lungs: Poor respiratory effort accentuates the pulmonary vasculature and cardiomediastinal silhouette. No definite consolidation.  Pleura: No pleural effusion or pneumothorax.    Cardiomediastinum: The  cardiomediastinal silhouette is normal    Soft Tissues: Unremarkable. Left chest wall loop recorder, unchanged.    Bones: No acute osseous abnormality.      Impression:      Impression:  No acute abnormality      Electronically Signed: Anmol Goncalves DO    11/21/2023 10:38 PM EST    Workstation ID: [id number]          reviewed    ECG/EMG Results (most recent)       Procedure Component Value Units Date/Time    ECG 12 Lead Rhythm Change [[id number]] Collected: 11/21/23 2158     Updated: 11/22/23 0711     QT Interval 311 ms      QTC Interval 463 ms     Narrative:      HEART RATE= 133  bpm  RR Interval= 452  ms  AZ Interval= 106  ms  P Horizontal Axis=   deg  P Front Axis= 82  deg  QRSD Interval= 101  ms  QT Interval= 311  ms  QTcB= 463  ms  QRS Axis= 84  deg  T Wave Axis= -55  deg  - ABNORMAL ECG -  Sinus tachycardia  Repolarization abnormality, prob rate related  When compared with ECG of 05-May-2023 14:21:14,  Significant rate increase  Significant repolarization change  Electronically Signed By: Daniel Quezada ([facility]) 22-Nov-2023 07:11:31  Date and Time of Study: 2023-11-21 21:58:35          reviewed    Results for orders placed during the hospital encounter of 02/07/23    Duplex venous lower extremity right CAR    Interpretation Summary    Normal right lower extremity venous duplex scan.      Results for orders placed during the hospital encounter of 07/07/22    Adult Transthoracic Echo Complete W/ Cont if Necessary Per Protocol    Interpretation Summary  · Estimated left ventricular EF = 60% Left ventricular systolic function is normal.    Indications  Chest pain  Dyspnea  Palpitations    Technically satisfactory study.  Mitral valve is structurally normal.  Mild mitral regurgitation.  Tricuspid valve is structurally normal.  Aortic valve is structurally normal.  Pulmonic valve could not be well visualized.  No evidence for tricuspid or aortic regurgitation is seen by Doppler study.  Left atrium is normal in  size.  Right atrium is normal in size.  Left ventricle is normal in size and contractility with ejection fraction of 60%.  Right ventricle is normal in size.  Atrial septum is intact.  Aorta is normal.  No pericardial effusion or intracardiac thrombus is seen.    Impression  Structurally and functionally normal cardiac valves except for mild mitral regurgitation.  Left ventricular size and contractility is normal with ejection fraction of 60%.      Microbiology Results (last 10 days)       ** No results found for the last 240 hours. **            Assessment & Plan     Tachycardia     Palpitation  Lab Results   Component Value Date    TROPONINT 13 11/22/2023    TROPONINT 9 11/21/2023    TROPONINT 9 05/05/2023   -proBNP: 171.6  -Chest X-ray: No acute abnormality  -EKG: Sinus tachycardia 133 bpm with a QTc of 463 ms  -In the ED pt given IV metoprolol with improvement in heart rate  -Electrophysiologist consulted who noted patient appears to be in a flutter recommending initiation of Eliquis and plan for scheduled outpatient ablation  -Telemetry  -Resume propranolol and Ranexa and continue aspirin    GERD  -PPI    Depression  -Celexa      I discussed the patients findings and my recommendations with patient and nursing staff.     Discharge Diagnosis:      Tachycardia      Hospital Course  Patient is a 78 y.o. female presented with palpitations with some dyspnea and chest pain and HPI noted above.  Serial troponins were assessed and found to be 9, 13 with a proBNP within normal limits at 171.6.  EKG showed sinus tach at 133 and chest x-ray showed no acute abnormalities.  Patient was given IV metoprolol in the ED with improvement in rate noted and no further symptoms reported.  Electrophysiologist was consulted who evaluated patient noting she appears to be in a flutter recommending initiation of Eliquis at discharge and plans to schedule outpatient ablation procedure.  At this time patient felt to be in good condition  for discharge with close follow-up with her PCP as well as cardiology on an outpatient basis.  Her full testing/results and plan were discussed with patient along with concerning/alarm symptoms for which to call 911/return to the ED.  Eliquis will be prescribed at discharge.  All questions were answered and she verbalizes her understanding and agreement.    Past Medical History:     Past Medical History:   Diagnosis Date    Acid reflux     Anemia     Bradycardia     History of loop recorder     approximately 2017 or thereabouts    Hyperlipidemia     Mobility poor     cane/walker PRN    Palpitations        Past Surgical History:     Past Surgical History:   Procedure Laterality Date    CARDIAC CATHETERIZATION      CARDIAC CATHETERIZATION N/A 9/2/2022    Procedure: Left and Right Heart Cath with Coronary Angiography;  Surgeon: Otis Patel MD;  Location: [facility] CATH INVASIVE LOCATION;  Service: Cardiovascular;  Laterality: N/A;       Social History:   Social History     Socioeconomic History    Marital status:    Tobacco Use    Smoking status: Former     Passive exposure: Past    Smokeless tobacco: Never   Vaping Use    Vaping Use: Never used   Substance and Sexual Activity    Alcohol use: Yes     Alcohol/week: 7.0 standard drinks of alcohol     Types: 7 Glasses of wine per week     Comment: wine with dinner    Drug use: Never    Sexual activity: Defer       Procedures Performed         Consults:   Consults       Date and Time Order Name Status Description    11/22/2023 12:55 AM Inpatient Cardiology Consult              Condition on Discharge:     Stable    Discharge Disposition  Home or Self Care    Discharge Medications     Discharge Medications        New Medications        Instructions Start Date   apixaban 5 MG tablet tablet  Commonly known as: ELIQUIS   5 mg, Oral, Every 12 Hours Scheduled      predniSONE 20 MG tablet  Commonly known as: DELTASONE   20 mg, Oral, Take As Directed, Take 1 tablet (50mg)  13 hours and 7 hours prior to exam             Continue These Medications        Instructions Start Date   aspirin 81 MG EC tablet   81 mg, Oral, Nightly      calcium carbonate 600 MG tablet  Commonly known as: OS-RAVEN   600 mg, Oral, 2 Times Daily With Meals      cholecalciferol 25 MCG (1000 UT) tablet  Commonly known as: VITAMIN D3   1,000 Units, Oral, Daily      citalopram 10 MG tablet  Commonly known as: CeleXA   1 tablet, Daily      diphenhydrAMINE 25 mg capsule  Commonly known as: BENADRYL   25 mg, Oral, Every 6 Hours PRN      melatonin 1 MG tablet   3 mg, Oral, Nightly PRN      montelukast 10 MG tablet  Commonly known as: SINGULAIR   10 mg, Oral, Nightly      omeprazole 40 MG capsule  Commonly known as: priLOSEC   40 mg, Oral, Daily      Polyvinyl Alcohol-Povidone PF 1.4-0.6 % ophthalmic solution  Commonly known as: HYPOTEARS   1-2 drops, Both Eyes, As Needed      propranolol  MG 24 hr capsule  Commonly known as: INDERAL LA   TAKE 1 CAPSULE EVERY       MORNING      ranolazine 500 MG 12 hr tablet  Commonly known as: RANEXA   500 mg, Oral, 2 Times Daily      Restasis 0.05 % ophthalmic emulsion  Generic drug: cycloSPORINE   1 drop, Both Eyes, Every 12 Hours      SUMAtriptan 50 MG tablet  Commonly known as: IMITREX   1 tablet, Oral, As Needed               Discharge Diet:     Activity at Discharge:     Follow-up Appointments  No future appointments.  Additional Instructions for the Follow-ups that You Need to Schedule       Discharge Follow-up with PCP   As directed       Currently Documented PCP:    Pedro Rivera MD    PCP Phone Number:    [phone]     Follow Up Details: 5 to 7 days        Discharge Follow-up with Specified Provider: Electrophysiology   As directed      To: Electrophysiology                Test Results Pending at Discharge       Risk for Readmission (LACE) Score: 2 (11/22/2023  6:00 AM)      Greater than 30 minutes spent in discharge activities for this patient    Robbie Arguelles  BHARAT  11/22/23  09:45 EST

## 2023-11-22 NOTE — ED NOTES
Pt presented to the ED with complaints of SOB and the feeling of her heart racing. Pt states this came about around 2100 today. Pt states she feels like she can not catch her breath. Pt is alert and oriented x4. Pt denies hx of afib. Pt states she takes baby aspirin daily. Pt's family is at bedside. Pt placed in gown. Pt placed on continuous cardiac and oxygen saturation monitoring. Pt is in ER stretcher with call light within reach with instructions to use. Pt is free from needs at this time.

## 2023-11-22 NOTE — PLAN OF CARE
Problem: Adult Inpatient Plan of Care  Goal: Plan of Care Review  Outcome: Ongoing, Progressing  Flowsheets (Taken 11/22/2023 0206)  Progress: improving  Plan of Care Reviewed With: patient  Outcome Evaluation: HR stable at this time, no tachycardia noted. No complaints of chest pain. Will continue to monitor  Goal: Patient-Specific Goal (Individualized)  Outcome: Ongoing, Progressing  Goal: Absence of Hospital-Acquired Illness or Injury  Outcome: Ongoing, Progressing  Intervention: Identify and Manage Fall Risk  Recent Flowsheet Documentation  Taken 11/22/2023 0153 by Екатерина Lieberman RN  Safety Promotion/Fall Prevention: safety round/check completed  Intervention: Prevent Skin Injury  Recent Flowsheet Documentation  Taken 11/22/2023 0153 by Екатерина Lieberman RN  Body Position: position changed independently  Intervention: Prevent and Manage VTE (Venous Thromboembolism) Risk  Recent Flowsheet Documentation  Taken 11/22/2023 0153 by Екатерина Lieberman RN  Activity Management: ambulated to bathroom  VTE Prevention/Management: sequential compression devices off  Range of Motion: active ROM (range of motion) encouraged  Intervention: Prevent Infection  Recent Flowsheet Documentation  Taken 11/22/2023 0153 by Екатерина Lieberman RN  Infection Prevention:   hand hygiene promoted   rest/sleep promoted   single patient room provided  Goal: Optimal Comfort and Wellbeing  Outcome: Ongoing, Progressing  Intervention: Provide Person-Centered Care  Recent Flowsheet Documentation  Taken 11/22/2023 0153 by Екатерина Lieberman RN  Trust Relationship/Rapport:   care explained   choices provided   questions answered   questions encouraged   reassurance provided   thoughts/feelings acknowledged  Goal: Readiness for Transition of Care  Outcome: Ongoing, Progressing  Intervention: Mutually Develop Transition Plan  Recent Flowsheet Documentation  Taken 11/22/2023 0058 by Екатерина Lieberman RN  Transportation  Anticipated: family or friend will provide  Patient/Family Anticipated Services at Transition: none  Patient/Family Anticipates Transition to: home  Taken 11/22/2023 0057 by Екатерина Lieberman RN  Equipment Currently Used at Home: none     Problem: Chest Pain  Goal: Resolution of Chest Pain Symptoms  Outcome: Ongoing, Progressing     Problem: Fall Injury Risk  Goal: Absence of Fall and Fall-Related Injury  Outcome: Ongoing, Progressing  Intervention: Identify and Manage Contributors  Recent Flowsheet Documentation  Taken 11/22/2023 0153 by Екатерина Lieberman RN  Medication Review/Management: medications reviewed  Intervention: Promote Injury-Free Environment  Recent Flowsheet Documentation  Taken 11/22/2023 0153 by Екатерина Lieberman RN  Safety Promotion/Fall Prevention: safety round/check completed     Problem: Dysrhythmia  Goal: Normalized Cardiac Rhythm  Outcome: Ongoing, Progressing  Intervention: Monitor and Manage Cardiac Rhythm Effect  Recent Flowsheet Documentation  Taken 11/22/2023 0153 by Екатерина Lieberman RN  VTE Prevention/Management: sequential compression devices off   Goal Outcome Evaluation:  Plan of Care Reviewed With: patient        Progress: improving  Outcome Evaluation: HR stable at this time, no tachycardia noted. No complaints of chest pain. Will continue to monitor

## 2023-11-22 NOTE — ED PROVIDER NOTES
Subjective   History of Present Illness  Patient is a 78-year-old female presenting to the ED with complaints of palpitations that started around 8 PM this evening when she was watching TV.  She reports some slight chest discomfort dyspnea and lightheadedness.  She denies any recent travel, surgeries, or immobilization.  No unilateral leg swelling.  Patient states she has had similar symptoms in the past and recently had a Holter monitor that was unremarkable.  She denies any cough congestion fever or chills.  No abdominal pain nausea or vomiting.  Patient denies being on any blood thinners.  Does report taking propanolol daily        Review of Systems   Constitutional: Negative.    HENT: Negative.     Eyes:  Negative for photophobia and visual disturbance.   Respiratory:  Positive for shortness of breath. Negative for apnea, cough, choking, chest tightness, wheezing and stridor.    Cardiovascular:  Positive for chest pain and palpitations.   Gastrointestinal:  Negative for abdominal distention, abdominal pain, nausea and vomiting.   Genitourinary:  Negative for difficulty urinating, dysuria, flank pain, hematuria and urgency.   Musculoskeletal:  Negative for back pain, neck pain and neck stiffness.   Skin: Negative.    Neurological:  Positive for dizziness and light-headedness. Negative for tremors, seizures, syncope, facial asymmetry, speech difficulty, weakness, numbness and headaches.   Hematological: Negative.        Past Medical History:   Diagnosis Date    Acid reflux     Anemia     Bradycardia     History of loop recorder     approximately 2017 or thereabouts    Hyperlipidemia     Mobility poor     cane/walker PRN    Palpitations        Allergies   Allergen Reactions    Contrast Dye (Echo Or Unknown Ct/Mr) Unknown (See Comments)    Hydrocodone Unknown (See Comments)    Oxycodone Unknown (See Comments)    Sulfamethoxazole-Trimethoprim Hives    Cyclobenzaprine Other (See Comments)     Off balance    Loratadine  Other (See Comments)    Tramadol Hcl Unknown (See Comments)       Past Surgical History:   Procedure Laterality Date    CARDIAC CATHETERIZATION      CARDIAC CATHETERIZATION N/A 9/2/2022    Procedure: Left and Right Heart Cath with Coronary Angiography;  Surgeon: Otis Patel MD;  Location: [facility] CATH INVASIVE LOC[facility];  Service: Cardiovascular;  Laterality: N/A;       Family History   Problem Relation Age of Onset    Hypertension Mother     Asthma Mother        Social History     Socioeconomic History    Marital status:    Tobacco Use    Smoking status: Former     Passive exposure: Past    Smokeless tobacco: Never   Vaping Use    Vaping Use: Never used   Substance and Sexual Activity    Alcohol use: Yes     Alcohol/week: 7.0 standard drinks of alcohol     Types: 7 Glasses of wine per week     Comment: wine with dinner    Drug use: Never    Sexual activity: Defer           Objective   Physical Exam  Vitals and nursing note reviewed.   Constitutional:       General: She is not in acute distress.     Appearance: Normal appearance. She is well-developed. She is not ill-appearing, toxic-appearing or diaphoretic.   HENT:      Head: Normocephalic and atraumatic.      Mouth/Throat:      Mouth: Mucous membranes are moist.      Pharynx: Oropharynx is clear.   Eyes:      General: No scleral icterus.     Extraocular Movements: Extraocular movements intact.      Pupils: Pupils are equal, round, and reactive to light.   Cardiovascular:      Rate and Rhythm: Regular rhythm. Tachycardia present.      Pulses: Normal pulses.      Heart sounds: No murmur heard.     No friction rub. No gallop.   Pulmonary:      Effort: Pulmonary effort is normal. No respiratory distress.      Breath sounds: Normal breath sounds. No stridor. No wheezing, rhonchi or rales.   Chest:      Chest wall: No tenderness.   Abdominal:      General: Bowel sounds are normal. There is no distension. There are no signs of injury.      Palpations: Abdomen  "is soft.      Tenderness: There is no abdominal tenderness. There is no guarding or rebound.   Skin:     General: Skin is warm.      Capillary Refill: Capillary refill takes less than 2 seconds.      Coloration: Skin is not cyanotic, jaundiced or pale.      Findings: No rash.   Neurological:      General: No focal deficit present.      Mental Status: She is alert and oriented to person, place, and time.      GCS: GCS eye subscore is 4. GCS verbal subscore is 5. GCS motor subscore is 6.   Psychiatric:         Mood and Affect: Mood normal.         Behavior: Behavior normal.         Procedures           ED Course    /70   Pulse 84   Temp 98.4 °F (36.9 °C) (Oral)   Resp 17   Ht 165.1 cm (65\")   Wt 77.8 kg (171 lb 8.3 oz)   SpO2 94%   BMI 28.54 kg/m²   Medications   sodium chloride 0.9 % flush 10 mL (has no administration in time range)   sodium chloride 0.9 % bolus 500 mL (500 mL Intravenous New Bag 11/21/23 2228)   metoprolol tartrate (LOPRESSOR) injection 5 mg (5 mg Intravenous Given 11/21/23 2224)     Labs Reviewed   COMPREHENSIVE METABOLIC PANEL - Abnormal; Notable for the following components:       Result Value    Glucose 122 (*)     All other components within normal limits    Narrative:     GFR Normal >60  Chronic Kidney Disease <60  Kidney Failure <15    The GFR formula is only valid for adults with stable renal function between ages 18 and 70.   CBC WITH AUTO DIFFERENTIAL - Abnormal; Notable for the following components:    Eosinophil % 7.1 (*)     Eosinophils, Absolute 0.50 (*)     All other components within normal limits   TROPONIN - Normal    Narrative:     High Sensitive Troponin T Reference Range:  <14.0 ng/L- Negative Female for AMI  <22.0 ng/L- Negative Male for AMI  >=14 - Abnormal Female indicating possible myocardial injury.  >=22 - Abnormal Male indicating possible myocardial injury.   Clinicians would have to utilize clinical acumen, EKG, Troponin, and serial changes to determine if " it is an Acute Myocardial Infarction or myocardial injury due to an underlying chronic condition.        BNP (IN-HOUSE) - Normal    Narrative:     This assay is used as an aid in the diagnosis of individuals suspected of having heart failure. It can be used as an aid in the diagnosis of acute decompensated heart failure (ADHF) in patients presenting with signs and symptoms of ADHF to the emergency department (ED). In addition, NT-proBNP of <300 pg/mL indicates ADHF is not likely.    Age Range Result Interpretation  NT-proBNP Concentration (pg/mL:      <50             Positive            >450                   Gray                 300-450                    Negative             <300    50-75           Positive            >900                  Gray                300-900                  Negative            <300      >75             Positive            >1800                  Gray                300-1800                  Negative            <300   PROTIME-INR - Normal   RAINBOW DRAW    Narrative:     The following orders were created for panel order Gary Draw.  Procedure                               Abnormality         Status                     ---------                               -----------         ------                     Green Top (Gel)[[id number]]                                  Final result               Lavender Top[[id number]]                                     In process                 Gold Top - SST[[id number]]                                   In process                 Light Blue Top[[id number]]                                   In process                   Please view results for these tests on the individual orders.   HIGH SENSITIVITIY TROPONIN T 2HR   GREEN TOP   CBC AND DIFFERENTIAL    Narrative:     The following orders were created for panel order CBC & Differential.  Procedure                               Abnormality         Status                     ---------                                -----------         ------                     CBC Auto Differential[[id number]]        Abnormal            Final result                 Please view results for these tests on the individual orders.   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  Repeat EKG after Lopressor showed sinus rhythm rate 83 normal EKG  Labs: As above  Radiology: My interpretation shows no acute infiltrate or pneumothorax correlated with radiologist interpretation  XR Chest 1 View   Final Result    Impression:    No acute abnormality            Electronically Signed: Anmol Goncalves,       11/21/2023 10:38 PM EST      Workstation ID: [id number]         Disposition/Treatment:  Appropriate PPE was worn during exam and throughout all encounters with the patient.  While in the ED IV was placed and labs were obtained patient was placed on proper monitors presents with complaints of palpitations initial EKG showed sinus tachycardia rate in the 130s she was given a dose of Lopressor while in the ED repeat EKG showed sinus rhythm rate 83.  Labs and imaging were obtained.    CBC shows no leukocytosis or anemia.  Metabolic panel unremarkable.  Troponin and BNP normal.  Chest x-ray showed no acute cardiopulmonary abnormalities.  She is currently  asymptomatic.  Findings were discussed with the patient at bedside voiced understanding placement observation unit for further cardiac work-up she was in agreement with plan.  Patient requested to see Dr. Hernandez with cardiology.  Consult was placed.  She has seen Dr. Patel in the past last in May was having a work-up for palpitations and possible tachybradycardia syndrome    Amount and/or Complexity of Data Reviewed  External Data Reviewed: notes.  Labs: ordered. Decision-making details documented in ED Course.  Radiology: ordered. Decision-making details documented in ED Course.  ECG/medicine tests: ordered.    Risk  Prescription drug management.        Final diagnoses:   Tachycardia   Palpitation       ED Disposition  ED Disposition       ED Disposition   Decision to Admit    Condition   --    Comment   --               No follow-up provider specified.       Medication List      No changes were made to your prescriptions during this visit.            Damaris Case PA  11/21/23 3345

## 2023-11-24 NOTE — CASE MANAGEMENT/SOCIAL WORK
Case Management Discharge Note      Final Note: Routine home         Selected Continued Care - Discharged on 11/22/2023 Admission date: 11/21/2023 - Discharge disposition: Home or Self Care         Transportation Services  Private: Car    Final Discharge Disposition Code: 01 - home or self-care

## 2023-11-28 PROBLEM — R00.2 PALPITATION: Status: ACTIVE | Noted: 2023-11-22

## 2023-11-28 PROBLEM — I48.92 ATRIAL FLUTTER WITH RAPID VENTRICULAR RESPONSE: Status: ACTIVE | Noted: 2023-11-22

## 2023-12-08 LAB
QT INTERVAL: 409 MS
QTC INTERVAL: 481 MS

## 2024-01-22 ENCOUNTER — LAB (OUTPATIENT)
Dept: LAB | Facility: HOSPITAL | Age: 79
End: 2024-01-22
Payer: MEDICARE

## 2024-01-22 DIAGNOSIS — I48.92 ATRIAL FLUTTER WITH RAPID VENTRICULAR RESPONSE: ICD-10-CM

## 2024-01-22 DIAGNOSIS — R00.0 TACHYCARDIA: ICD-10-CM

## 2024-01-22 DIAGNOSIS — R00.2 PALPITATION: ICD-10-CM

## 2024-01-22 LAB
ALBUMIN SERPL-MCNC: 3.9 G/DL (ref 3.5–5.2)
ALBUMIN/GLOB SERPL: 1.7 G/DL
ALP SERPL-CCNC: 46 U/L (ref 39–117)
ALT SERPL W P-5'-P-CCNC: 22 U/L (ref 1–33)
ANION GAP SERPL CALCULATED.3IONS-SCNC: 9.8 MMOL/L (ref 5–15)
AST SERPL-CCNC: 20 U/L (ref 1–32)
BASOPHILS # BLD AUTO: 0.04 10*3/MM3 (ref 0–0.2)
BASOPHILS NFR BLD AUTO: 0.6 % (ref 0–1.5)
BILIRUB SERPL-MCNC: 1.1 MG/DL (ref 0–1.2)
BUN SERPL-MCNC: 13 MG/DL (ref 8–23)
BUN/CREAT SERPL: 16 (ref 7–25)
CALCIUM SPEC-SCNC: 9.5 MG/DL (ref 8.6–10.5)
CHLORIDE SERPL-SCNC: 102 MMOL/L (ref 98–107)
CO2 SERPL-SCNC: 28.2 MMOL/L (ref 22–29)
CREAT SERPL-MCNC: 0.81 MG/DL (ref 0.57–1)
DEPRECATED RDW RBC AUTO: 42.2 FL (ref 37–54)
EGFRCR SERPLBLD CKD-EPI 2021: 73.5 ML/MIN/1.73
EOSINOPHIL # BLD AUTO: 0.34 10*3/MM3 (ref 0–0.4)
EOSINOPHIL NFR BLD AUTO: 4.8 % (ref 0.3–6.2)
ERYTHROCYTE [DISTWIDTH] IN BLOOD BY AUTOMATED COUNT: 12.9 % (ref 12.3–15.4)
GLOBULIN UR ELPH-MCNC: 2.3 GM/DL
GLUCOSE SERPL-MCNC: 80 MG/DL (ref 65–99)
HCT VFR BLD AUTO: 38.2 % (ref 34–46.6)
HGB BLD-MCNC: 12.3 G/DL (ref 12–15.9)
IMM GRANULOCYTES # BLD AUTO: 0.06 10*3/MM3 (ref 0–0.05)
IMM GRANULOCYTES NFR BLD AUTO: 0.8 % (ref 0–0.5)
LYMPHOCYTES # BLD AUTO: 1.66 10*3/MM3 (ref 0.7–3.1)
LYMPHOCYTES NFR BLD AUTO: 23.5 % (ref 19.6–45.3)
MAGNESIUM SERPL-MCNC: 2 MG/DL (ref 1.6–2.4)
MCH RBC QN AUTO: 29.1 PG (ref 26.6–33)
MCHC RBC AUTO-ENTMCNC: 32.2 G/DL (ref 31.5–35.7)
MCV RBC AUTO: 90.3 FL (ref 79–97)
MONOCYTES # BLD AUTO: 0.7 10*3/MM3 (ref 0.1–0.9)
MONOCYTES NFR BLD AUTO: 9.9 % (ref 5–12)
NEUTROPHILS NFR BLD AUTO: 4.27 10*3/MM3 (ref 1.7–7)
NEUTROPHILS NFR BLD AUTO: 60.4 % (ref 42.7–76)
NRBC BLD AUTO-RTO: 0 /100 WBC (ref 0–0.2)
PLATELET # BLD AUTO: 198 10*3/MM3 (ref 140–450)
PMV BLD AUTO: 9.9 FL (ref 6–12)
POTASSIUM SERPL-SCNC: 4.2 MMOL/L (ref 3.5–5.2)
PROT SERPL-MCNC: 6.2 G/DL (ref 6–8.5)
RBC # BLD AUTO: 4.23 10*6/MM3 (ref 3.77–5.28)
SODIUM SERPL-SCNC: 140 MMOL/L (ref 136–145)
WBC NRBC COR # BLD AUTO: 7.07 10*3/MM3 (ref 3.4–10.8)

## 2024-01-22 PROCEDURE — 36415 COLL VENOUS BLD VENIPUNCTURE: CPT

## 2024-01-22 PROCEDURE — 85025 COMPLETE CBC W/AUTO DIFF WBC: CPT

## 2024-01-22 PROCEDURE — 83735 ASSAY OF MAGNESIUM: CPT

## 2024-01-22 PROCEDURE — 80053 COMPREHEN METABOLIC PANEL: CPT

## 2024-01-24 ENCOUNTER — ANESTHESIA (OUTPATIENT)
Dept: CARDIOLOGY | Facility: HOSPITAL | Age: 79
End: 2024-01-24
Payer: MEDICARE

## 2024-01-24 ENCOUNTER — ANESTHESIA EVENT (OUTPATIENT)
Dept: CARDIOLOGY | Facility: HOSPITAL | Age: 79
End: 2024-01-24
Payer: MEDICARE

## 2024-01-24 ENCOUNTER — HOSPITAL ENCOUNTER (OUTPATIENT)
Dept: CARDIOLOGY | Facility: HOSPITAL | Age: 79
Discharge: HOME | End: 2024-01-24
Payer: MEDICARE

## 2024-01-24 ENCOUNTER — HOSPITAL ENCOUNTER (OUTPATIENT)
Facility: HOSPITAL | Age: 79
Setting detail: HOSPITAL OUTPATIENT SURGERY
Discharge: HOME | End: 2024-01-24
Attending: INTERNAL MEDICINE | Admitting: INTERNAL MEDICINE
Payer: MEDICARE

## 2024-01-24 VITALS
TEMPERATURE: 97.5 F | BODY MASS INDEX: 27.7 KG/M2 | HEART RATE: 98 BPM | SYSTOLIC BLOOD PRESSURE: 143 MMHG | DIASTOLIC BLOOD PRESSURE: 99 MMHG | RESPIRATION RATE: 20 BRPM | HEIGHT: 65 IN | WEIGHT: 166.23 LBS | OXYGEN SATURATION: 94 %

## 2024-01-24 DIAGNOSIS — I48.92 ATRIAL FLUTTER WITH RAPID VENTRICULAR RESPONSE: ICD-10-CM

## 2024-01-24 DIAGNOSIS — R00.0 TACHYCARDIA: ICD-10-CM

## 2024-01-24 DIAGNOSIS — R00.2 PALPITATION: ICD-10-CM

## 2024-01-24 DIAGNOSIS — I48.3 TYPICAL ATRIAL FLUTTER: ICD-10-CM

## 2024-01-24 LAB
BH CV ECHO SHUNT ASSESSMENT PERFORMED (HIDDEN SCRIPTING): 1
LV EF 2D ECHO EST: 60 %

## 2024-01-24 PROCEDURE — 85347 COAGULATION TIME ACTIVATED: CPT

## 2024-01-24 PROCEDURE — C1732 CATH, EP, DIAG/ABL, 3D/VECT: HCPCS | Performed by: INTERNAL MEDICINE

## 2024-01-24 PROCEDURE — 25510000001 IOPAMIDOL PER 1 ML: Performed by: INTERNAL MEDICINE

## 2024-01-24 PROCEDURE — 25010000002 DEXAMETHASONE PER 1 MG: Performed by: ANESTHESIOLOGIST ASSISTANT

## 2024-01-24 PROCEDURE — 93655 ICAR CATH ABLTJ DSCRT ARRHYT: CPT | Performed by: INTERNAL MEDICINE

## 2024-01-24 PROCEDURE — C1894 INTRO/SHEATH, NON-LASER: HCPCS | Performed by: INTERNAL MEDICINE

## 2024-01-24 PROCEDURE — 25810000003 SODIUM CHLORIDE 0.9 % SOLUTION: Performed by: ANESTHESIOLOGIST ASSISTANT

## 2024-01-24 PROCEDURE — 25010000002 ONDANSETRON PER 1 MG: Performed by: ANESTHESIOLOGIST ASSISTANT

## 2024-01-24 PROCEDURE — C1766 INTRO/SHEATH,STRBLE,NON-PEEL: HCPCS | Performed by: INTERNAL MEDICINE

## 2024-01-24 PROCEDURE — 25010000002 PROTAMINE SULFATE PER 10 MG: Performed by: ANESTHESIOLOGIST ASSISTANT

## 2024-01-24 PROCEDURE — C1733 CATH, EP, OTHR THAN COOL-TIP: HCPCS | Performed by: INTERNAL MEDICINE

## 2024-01-24 PROCEDURE — C1769 GUIDE WIRE: HCPCS | Performed by: INTERNAL MEDICINE

## 2024-01-24 PROCEDURE — 93656 COMPRE EP EVAL ABLTJ ATR FIB: CPT | Performed by: INTERNAL MEDICINE

## 2024-01-24 PROCEDURE — 25810000003 SODIUM CHLORIDE 0.9 % SOLUTION 250 ML FLEX CONT: Performed by: ANESTHESIOLOGIST ASSISTANT

## 2024-01-24 PROCEDURE — 25010000002 DIPHENHYDRAMINE PER 50 MG: Performed by: ANESTHESIOLOGIST ASSISTANT

## 2024-01-24 PROCEDURE — 25010000002 LIDOCAINE 1 % SOLUTION: Performed by: INTERNAL MEDICINE

## 2024-01-24 PROCEDURE — C1893 INTRO/SHEATH, FIXED,NON-PEEL: HCPCS | Performed by: INTERNAL MEDICINE

## 2024-01-24 PROCEDURE — 93312 ECHO TRANSESOPHAGEAL: CPT

## 2024-01-24 PROCEDURE — 25010000002 FENTANYL CITRATE (PF) 100 MCG/2ML SOLUTION: Performed by: ANESTHESIOLOGIST ASSISTANT

## 2024-01-24 PROCEDURE — 25010000002 HYDROCORTISONE SOD SUC (PF) 100 MG RECONSTITUTED SOLUTION: Performed by: ANESTHESIOLOGIST ASSISTANT

## 2024-01-24 PROCEDURE — C1759 CATH, INTRA ECHOCARDIOGRAPHY: HCPCS | Performed by: INTERNAL MEDICINE

## 2024-01-24 PROCEDURE — 25010000002 PROPOFOL 1000 MG/100ML EMULSION: Performed by: ANESTHESIOLOGIST ASSISTANT

## 2024-01-24 PROCEDURE — 93320 DOPPLER ECHO COMPLETE: CPT

## 2024-01-24 PROCEDURE — 25010000002 HEPARIN (PORCINE) PER 1000 UNITS: Performed by: ANESTHESIOLOGIST ASSISTANT

## 2024-01-24 PROCEDURE — C1730 CATH, EP, 19 OR FEW ELECT: HCPCS | Performed by: INTERNAL MEDICINE

## 2024-01-24 PROCEDURE — 25010000002 PHENYLEPHRINE 10 MG/ML SOLUTION 5 ML VIAL: Performed by: ANESTHESIOLOGIST ASSISTANT

## 2024-01-24 PROCEDURE — 93325 DOPPLER ECHO COLOR FLOW MAPG: CPT

## 2024-01-24 RX ORDER — FENTANYL CITRATE 50 UG/ML
50 INJECTION, SOLUTION INTRAMUSCULAR; INTRAVENOUS
Status: DISCONTINUED | OUTPATIENT
Start: 2024-01-24 | End: 2024-01-24 | Stop reason: HOSPADM

## 2024-01-24 RX ORDER — ACETAMINOPHEN 650 MG/1
650 SUPPOSITORY RECTAL EVERY 4 HOURS PRN
Status: DISCONTINUED | OUTPATIENT
Start: 2024-01-24 | End: 2024-01-24 | Stop reason: HOSPADM

## 2024-01-24 RX ORDER — DEXAMETHASONE SODIUM PHOSPHATE 4 MG/ML
INJECTION, SOLUTION INTRA-ARTICULAR; INTRALESIONAL; INTRAMUSCULAR; INTRAVENOUS; SOFT TISSUE AS NEEDED
Status: DISCONTINUED | OUTPATIENT
Start: 2024-01-24 | End: 2024-01-24 | Stop reason: SURG

## 2024-01-24 RX ORDER — PROPOFOL 10 MG/ML
INJECTION, EMULSION INTRAVENOUS AS NEEDED
Status: DISCONTINUED | OUTPATIENT
Start: 2024-01-24 | End: 2024-01-24 | Stop reason: SURG

## 2024-01-24 RX ORDER — ONDANSETRON 2 MG/ML
INJECTION INTRAMUSCULAR; INTRAVENOUS AS NEEDED
Status: DISCONTINUED | OUTPATIENT
Start: 2024-01-24 | End: 2024-01-24 | Stop reason: SURG

## 2024-01-24 RX ORDER — LIDOCAINE HYDROCHLORIDE 10 MG/ML
INJECTION, SOLUTION INFILTRATION; PERINEURAL
Status: DISCONTINUED | OUTPATIENT
Start: 2024-01-24 | End: 2024-01-24 | Stop reason: HOSPADM

## 2024-01-24 RX ORDER — PROPRANOLOL HYDROCHLORIDE 120 MG/1
120 CAPSULE, EXTENDED RELEASE ORAL DAILY
COMMUNITY
End: 2024-01-25

## 2024-01-24 RX ORDER — DIPHENHYDRAMINE HYDROCHLORIDE 50 MG/ML
12.5 INJECTION INTRAMUSCULAR; INTRAVENOUS ONCE AS NEEDED
Status: DISCONTINUED | OUTPATIENT
Start: 2024-01-24 | End: 2024-01-24 | Stop reason: HOSPADM

## 2024-01-24 RX ORDER — PROTAMINE SULFATE 10 MG/ML
INJECTION, SOLUTION INTRAVENOUS AS NEEDED
Status: DISCONTINUED | OUTPATIENT
Start: 2024-01-24 | End: 2024-01-24 | Stop reason: SURG

## 2024-01-24 RX ORDER — EPHEDRINE SULFATE 5 MG/ML
5 INJECTION INTRAVENOUS ONCE AS NEEDED
Status: DISCONTINUED | OUTPATIENT
Start: 2024-01-24 | End: 2024-01-24 | Stop reason: HOSPADM

## 2024-01-24 RX ORDER — DIPHENHYDRAMINE HYDROCHLORIDE 50 MG/ML
12.5 INJECTION INTRAMUSCULAR; INTRAVENOUS
Status: DISCONTINUED | OUTPATIENT
Start: 2024-01-24 | End: 2024-01-24 | Stop reason: HOSPADM

## 2024-01-24 RX ORDER — FENTANYL CITRATE 50 UG/ML
INJECTION, SOLUTION INTRAMUSCULAR; INTRAVENOUS AS NEEDED
Status: DISCONTINUED | OUTPATIENT
Start: 2024-01-24 | End: 2024-01-24 | Stop reason: SURG

## 2024-01-24 RX ORDER — LABETALOL HYDROCHLORIDE 5 MG/ML
5 INJECTION, SOLUTION INTRAVENOUS
Status: DISCONTINUED | OUTPATIENT
Start: 2024-01-24 | End: 2024-01-24 | Stop reason: HOSPADM

## 2024-01-24 RX ORDER — ACETAMINOPHEN 325 MG/1
650 TABLET ORAL ONCE AS NEEDED
Status: DISCONTINUED | OUTPATIENT
Start: 2024-01-24 | End: 2024-01-24 | Stop reason: HOSPADM

## 2024-01-24 RX ORDER — PHENYLEPHRINE HCL IN 0.9% NACL 1 MG/10 ML
SYRINGE (ML) INTRAVENOUS AS NEEDED
Status: DISCONTINUED | OUTPATIENT
Start: 2024-01-24 | End: 2024-01-24 | Stop reason: SURG

## 2024-01-24 RX ORDER — HEPARIN SODIUM 1000 [USP'U]/ML
INJECTION, SOLUTION INTRAVENOUS; SUBCUTANEOUS AS NEEDED
Status: DISCONTINUED | OUTPATIENT
Start: 2024-01-24 | End: 2024-01-24 | Stop reason: SURG

## 2024-01-24 RX ORDER — LIDOCAINE HYDROCHLORIDE 20 MG/ML
INJECTION, SOLUTION INFILTRATION; PERINEURAL
Status: DISCONTINUED | OUTPATIENT
Start: 2024-01-24 | End: 2024-01-24 | Stop reason: HOSPADM

## 2024-01-24 RX ORDER — DIPHENHYDRAMINE HCL 25 MG
25 CAPSULE ORAL
Status: DISCONTINUED | OUTPATIENT
Start: 2024-01-24 | End: 2024-01-24 | Stop reason: HOSPADM

## 2024-01-24 RX ORDER — ONDANSETRON 2 MG/ML
4 INJECTION INTRAMUSCULAR; INTRAVENOUS ONCE AS NEEDED
Status: DISCONTINUED | OUTPATIENT
Start: 2024-01-24 | End: 2024-01-24 | Stop reason: HOSPADM

## 2024-01-24 RX ORDER — DIPHENHYDRAMINE HYDROCHLORIDE 50 MG/ML
INJECTION INTRAMUSCULAR; INTRAVENOUS AS NEEDED
Status: DISCONTINUED | OUTPATIENT
Start: 2024-01-24 | End: 2024-01-24 | Stop reason: SURG

## 2024-01-24 RX ORDER — IPRATROPIUM BROMIDE AND ALBUTEROL SULFATE 2.5; .5 MG/3ML; MG/3ML
3 SOLUTION RESPIRATORY (INHALATION) ONCE AS NEEDED
Status: DISCONTINUED | OUTPATIENT
Start: 2024-01-24 | End: 2024-01-24 | Stop reason: HOSPADM

## 2024-01-24 RX ORDER — PROMETHAZINE HYDROCHLORIDE 25 MG/1
25 SUPPOSITORY RECTAL ONCE AS NEEDED
Status: DISCONTINUED | OUTPATIENT
Start: 2024-01-24 | End: 2024-01-24 | Stop reason: HOSPADM

## 2024-01-24 RX ORDER — ONDANSETRON 2 MG/ML
4 INJECTION INTRAMUSCULAR; INTRAVENOUS EVERY 6 HOURS PRN
Status: DISCONTINUED | OUTPATIENT
Start: 2024-01-24 | End: 2024-01-24 | Stop reason: HOSPADM

## 2024-01-24 RX ORDER — ACETAMINOPHEN 325 MG/1
650 TABLET ORAL EVERY 4 HOURS PRN
Status: DISCONTINUED | OUTPATIENT
Start: 2024-01-24 | End: 2024-01-24 | Stop reason: HOSPADM

## 2024-01-24 RX ORDER — LORAZEPAM 1 MG/1
1 TABLET ORAL EVERY 6 HOURS PRN
Status: DISCONTINUED | OUTPATIENT
Start: 2024-01-24 | End: 2024-01-24 | Stop reason: HOSPADM

## 2024-01-24 RX ORDER — PROMETHAZINE HYDROCHLORIDE 25 MG/1
25 TABLET ORAL ONCE AS NEEDED
Status: DISCONTINUED | OUTPATIENT
Start: 2024-01-24 | End: 2024-01-24 | Stop reason: HOSPADM

## 2024-01-24 RX ORDER — NALOXONE HCL 0.4 MG/ML
0.4 VIAL (ML) INJECTION
Status: DISCONTINUED | OUTPATIENT
Start: 2024-01-24 | End: 2024-01-24 | Stop reason: HOSPADM

## 2024-01-24 RX ORDER — SODIUM CHLORIDE 9 MG/ML
INJECTION, SOLUTION INTRAVENOUS CONTINUOUS PRN
Status: DISCONTINUED | OUTPATIENT
Start: 2024-01-24 | End: 2024-01-24 | Stop reason: SURG

## 2024-01-24 RX ORDER — PANTOPRAZOLE SODIUM 40 MG/10ML
INJECTION, POWDER, LYOPHILIZED, FOR SOLUTION INTRAVENOUS AS NEEDED
Status: DISCONTINUED | OUTPATIENT
Start: 2024-01-24 | End: 2024-01-24 | Stop reason: SURG

## 2024-01-24 RX ORDER — MORPHINE SULFATE 2 MG/ML
1 INJECTION, SOLUTION INTRAMUSCULAR; INTRAVENOUS EVERY 4 HOURS PRN
Status: DISCONTINUED | OUTPATIENT
Start: 2024-01-24 | End: 2024-01-24 | Stop reason: HOSPADM

## 2024-01-24 RX ORDER — HYDRALAZINE HYDROCHLORIDE 20 MG/ML
5 INJECTION INTRAMUSCULAR; INTRAVENOUS
Status: DISCONTINUED | OUTPATIENT
Start: 2024-01-24 | End: 2024-01-24 | Stop reason: HOSPADM

## 2024-01-24 RX ORDER — FLUMAZENIL 0.1 MG/ML
0.5 INJECTION INTRAVENOUS AS NEEDED
Status: DISCONTINUED | OUTPATIENT
Start: 2024-01-24 | End: 2024-01-24 | Stop reason: HOSPADM

## 2024-01-24 RX ORDER — FENTANYL CITRATE 50 UG/ML
25 INJECTION, SOLUTION INTRAMUSCULAR; INTRAVENOUS
Status: DISCONTINUED | OUTPATIENT
Start: 2024-01-24 | End: 2024-01-24 | Stop reason: HOSPADM

## 2024-01-24 RX ORDER — FAMOTIDINE 10 MG/ML
INJECTION, SOLUTION INTRAVENOUS AS NEEDED
Status: DISCONTINUED | OUTPATIENT
Start: 2024-01-24 | End: 2024-01-24 | Stop reason: SURG

## 2024-01-24 RX ORDER — MIDAZOLAM HYDROCHLORIDE 1 MG/ML
1 INJECTION INTRAMUSCULAR; INTRAVENOUS
Status: DISCONTINUED | OUTPATIENT
Start: 2024-01-24 | End: 2024-01-24 | Stop reason: HOSPADM

## 2024-01-24 RX ORDER — NALOXONE HCL 0.4 MG/ML
0.4 VIAL (ML) INJECTION AS NEEDED
Status: DISCONTINUED | OUTPATIENT
Start: 2024-01-24 | End: 2024-01-24 | Stop reason: HOSPADM

## 2024-01-24 RX ADMIN — FENTANYL CITRATE 25 MCG: 50 INJECTION, SOLUTION INTRAMUSCULAR; INTRAVENOUS at 09:41

## 2024-01-24 RX ADMIN — SODIUM CHLORIDE: 9 INJECTION, SOLUTION INTRAVENOUS at 09:12

## 2024-01-24 RX ADMIN — Medication 150 MCG: at 09:16

## 2024-01-24 RX ADMIN — PROPOFOL INJECTABLE EMULSION 30 MG: 10 INJECTION, EMULSION INTRAVENOUS at 09:06

## 2024-01-24 RX ADMIN — HEPARIN SODIUM 3000 UNITS: 1000 INJECTION INTRAVENOUS; SUBCUTANEOUS at 10:23

## 2024-01-24 RX ADMIN — PROTAMINE SULFATE 50 MG: 10 INJECTION, SOLUTION INTRAVENOUS at 11:19

## 2024-01-24 RX ADMIN — PROPOFOL INJECTABLE EMULSION 40 MG: 10 INJECTION, EMULSION INTRAVENOUS at 10:43

## 2024-01-24 RX ADMIN — PROPOFOL INJECTABLE EMULSION 50 MG: 10 INJECTION, EMULSION INTRAVENOUS at 11:06

## 2024-01-24 RX ADMIN — FENTANYL CITRATE 25 MCG: 50 INJECTION, SOLUTION INTRAMUSCULAR; INTRAVENOUS at 09:48

## 2024-01-24 RX ADMIN — PROPOFOL INJECTABLE EMULSION 30 MG: 10 INJECTION, EMULSION INTRAVENOUS at 08:54

## 2024-01-24 RX ADMIN — FENTANYL CITRATE 25 MCG: 50 INJECTION, SOLUTION INTRAMUSCULAR; INTRAVENOUS at 09:05

## 2024-01-24 RX ADMIN — HEPARIN SODIUM 8000 UNITS: 1000 INJECTION INTRAVENOUS; SUBCUTANEOUS at 10:22

## 2024-01-24 RX ADMIN — PROPOFOL INJECTABLE EMULSION 140 MCG/KG/MIN: 10 INJECTION, EMULSION INTRAVENOUS at 10:37

## 2024-01-24 RX ADMIN — PROPOFOL INJECTABLE EMULSION 30 MG: 10 INJECTION, EMULSION INTRAVENOUS at 09:01

## 2024-01-24 RX ADMIN — LIDOCAINE HYDROCHLORIDE 50 MG: 20 INJECTION, SOLUTION EPIDURAL; INFILTRATION; INTRACAUDAL; PERINEURAL at 08:50

## 2024-01-24 RX ADMIN — HEPARIN SODIUM 1000 UNITS: 1000 INJECTION INTRAVENOUS; SUBCUTANEOUS at 11:01

## 2024-01-24 RX ADMIN — FENTANYL CITRATE 25 MCG: 50 INJECTION, SOLUTION INTRAMUSCULAR; INTRAVENOUS at 10:38

## 2024-01-24 RX ADMIN — Medication 100 MCG: at 08:59

## 2024-01-24 RX ADMIN — FENTANYL CITRATE 25 MCG: 50 INJECTION, SOLUTION INTRAMUSCULAR; INTRAVENOUS at 08:50

## 2024-01-24 RX ADMIN — FENTANYL CITRATE 50 MCG: 50 INJECTION, SOLUTION INTRAMUSCULAR; INTRAVENOUS at 10:29

## 2024-01-24 RX ADMIN — PANTOPRAZOLE SODIUM 40 MG: 40 INJECTION, POWDER, FOR SOLUTION INTRAVENOUS at 10:43

## 2024-01-24 RX ADMIN — HYDROCORTISONE SODIUM SUCCINATE 100 MG: 100 INJECTION, POWDER, FOR SOLUTION INTRAMUSCULAR; INTRAVENOUS at 09:17

## 2024-01-24 RX ADMIN — ONDANSETRON 4 MG: 2 INJECTION INTRAMUSCULAR; INTRAVENOUS at 11:19

## 2024-01-24 RX ADMIN — PROPOFOL INJECTABLE EMULSION 80 MCG/KG/MIN: 10 INJECTION, EMULSION INTRAVENOUS at 08:52

## 2024-01-24 RX ADMIN — FENTANYL CITRATE 25 MCG: 50 INJECTION, SOLUTION INTRAMUSCULAR; INTRAVENOUS at 09:57

## 2024-01-24 RX ADMIN — SODIUM CHLORIDE: 9 INJECTION, SOLUTION INTRAVENOUS at 08:43

## 2024-01-24 RX ADMIN — FAMOTIDINE 20 MG: 10 INJECTION INTRAVENOUS at 10:18

## 2024-01-24 RX ADMIN — PHENYLEPHRINE HYDROCHLORIDE 0.5 MCG/KG/MIN: 10 INJECTION INTRAVENOUS at 09:24

## 2024-01-24 RX ADMIN — PROPOFOL INJECTABLE EMULSION 50 MG: 10 INJECTION, EMULSION INTRAVENOUS at 10:13

## 2024-01-24 RX ADMIN — DIPHENHYDRAMINE HYDROCHLORIDE 25 MG: 50 INJECTION, SOLUTION INTRAMUSCULAR; INTRAVENOUS at 09:16

## 2024-01-24 RX ADMIN — DEXAMETHASONE SODIUM PHOSPHATE 8 MG: 4 INJECTION, SOLUTION INTRAMUSCULAR; INTRAVENOUS at 10:35

## 2024-01-24 RX ADMIN — PROPOFOL INJECTABLE EMULSION 80 MG: 10 INJECTION, EMULSION INTRAVENOUS at 08:51

## 2024-01-24 NOTE — DISCHARGE INSTRUCTIONS
Do not eat spicy food for 1 month.          Drink plenty of fluids for the next 24 hours.  This helps to eliminate the dye used in your procedure through urination.  You may resume a normal diet; however, try to avoid foods that would cause gas or constipation.    Sedative medication given to you during your catheterization may decrease your judgement and reaction time for up to 24-48 hours.  Therefore:  DO NOT drive or operate hazardous machinery (48 hours)  DO NOT consume alcoholic beverages  DO NOT make any important/legal decisions  Have someone stay with you for at least 24 hours    To allow proper healing and prevent bleeding, the following activities are to be strictly avoided for the next 24-48 hours:  Excessive bending at wound site  Straining (anything that would tense up muscles around the affected puncture site)  Lifting objects greater than 5 pounds, pushing, or pulling for 5 days    For Groin Cases:  Refrain from sexual activity  Refrain from running or vigorous walking  No prolonged sitting or standing  Limit stair climbing as much as possible    Keep the puncture site clean and dry.  You may remove the dressing tomorrow and replace it with a band-aid for at least one additional day.  Gently clean the site with mild soap and water.  No scrubbing/rubbing and lightly pat the area dry.  Showers are acceptable; however, avoid submerging in water (tub baths, hot tubs, swimming pools, dishwater, etc…) for at least one week.  The site should be completely healed before resuming these activities to reduce the risk of infection.  Check the site often.  Watch for signs and symptoms of infection and notify your physician if any of the following occur:  Bleeding or an increase in swelling at the puncture site  Fever  Increased soreness around puncture site  Foul odor or significant drainage from the puncture site  Swelling, redness, or warmth at the puncture site    **A bruise or small “pea sized” lump under  the skin at the puncture site is not unusual.  This should disappear within 3-4 weeks.**  CONTACT YOUR PHYSICIAN OR CALL 911 IF YOU EXPERIENCE ANY OF THE FOLLOWING:  Increased angina (chest pain) or frequent sensations of pressure, burning, pain, or other discomfort in the chest, arm, jaws, or stomach  Lightheadedness, dizziness, faint feeling, sweating, or difficulty breathing  Odd sensation changes like numbness, tingling, coldness, or pain in the arm or leg in which the catheter was inserted  Limb in which the catheter was inserted becomes pale/bluish in color    IMPORTANT:  Although this occurs very rarely, if you should develop bright red or excessive bleeding, feel a “pop” inside at the insertion site, or notice a sudden increase in swelling larger than a walnut, you should call 911.  Hold continuous firm pressure to the access site until emergency personnel arrive.  It is best if someone else can do this for you.

## 2024-01-24 NOTE — ANESTHESIA POSTPROCEDURE EVALUATION
Patient: Joie Godinez    Procedure Summary       Date: 01/24/24 Room / Location: [facility] CATH [facility] 3 / [facility] CATH INVASIVE LOCATION    Anesthesia Start: 0843 Anesthesia Stop: 1137    Procedure: Ablation atrial flutter (Right: Groin) Diagnosis:       Tachycardia      Palpitation      Atrial flutter with rapid ventricular response      (atrial Flutter)    Providers: Branden Hernandez MD Provider: Donovan Benson MD    Anesthesia Type: general ASA Status: 3            Anesthesia Type: general    Vitals  Vitals Value Taken Time   /87 01/24/24 1237   Temp 97.5 °F (36.4 °C) 01/24/24 1235   Pulse 80 01/24/24 1238   Resp 20 01/24/24 1235   SpO2 93 % 01/24/24 1238   Vitals shown include unfiled device data.        Post Anesthesia Care and Evaluation    Patient location during evaluation: PACU  Patient participation: complete - patient participated  Level of consciousness: awake  Pain score: 0  Pain management: adequate  Anesthetic complications: No anesthetic complications  PONV Status: none  Cardiovascular status: acceptable  Respiratory status: acceptable  Hydration status: acceptable

## 2024-01-24 NOTE — Clinical Note
Hemostasis started on the right femoral vein. Figure 8 suturing was used in achieving hemostasis. Closure device deployed in the vessel. Hemostasis achieved successfully. Closure device additional comment: Both sheaths removed by Dr Hernandez using stopcock and suture.

## 2024-01-24 NOTE — H&P
CC--- intermittent  atrial arrhythmias     Sub  78-year-old pleasant patient started experiencing sudden palpitations yesterday.  Associated with some mild dyspnea and lightheadedness and came to the hospital.  Patient was given Lopressor with resolution of symptoms and overnight observation was done by consultation requested.  She denies any chest pain or any symptoms at this point.  Prior work-up with Holter revealed nonsustained atrial tachycardia.  Echocardiography with normal EF and cardiac catheterization without any significant coronary artery stenosis.  TSH is normal in the past.     With history of acid reflux and history of anemia and denies any history of stroke  Denies any bleeding diathesis and she has remote history of loop recorder with battery depletion.        Medical History        Past Medical History:   Diagnosis Date    Acid reflux      Anemia      Bradycardia      History of loop recorder       approximately 2017 or thereabouts    Hyperlipidemia      Mobility poor       cane/walker PRN    Palpitations           Surgical History         Past Surgical History:   Procedure Laterality Date    CARDIAC CATHETERIZATION        CARDIAC CATHETERIZATION N/A 9/2/2022     Procedure: Left and Right Heart Cath with Coronary Angiography;  Surgeon: Otis Patel MD;  Location: [facility] CATH INVASIVE LOCATION;  Service: Cardiovascular;  Laterality: N/A;               Family History   Problem Relation Age of Onset    Hypertension Mother      Asthma Mother        Social History            Tobacco Use    Smoking status: Former       Passive exposure: Past    Smokeless tobacco: Never   Vaping Use    Vaping Use: Never used   Substance Use Topics    Alcohol use: Yes       Alcohol/week: 7.0 standard drinks of alcohol       Types: 7 Glasses of wine per week       Comment: wine with dinner    Drug use: Never      Prescriptions Prior to Admission           Medications Prior to Admission   Medication Sig Dispense Refill  Last Dose    aspirin (aspirin) 81 MG EC tablet Take 1 tablet by mouth Every Night.     11/21/2023    calcium carbonate (OS-RAVEN) 600 MG tablet Take 1 tablet by mouth 2 (Two) Times a Day With Meals.     11/21/2023    cholecalciferol (VITAMIN D3) 1000 units tablet Take 1 tablet by mouth Daily.     11/21/2023    citalopram (CeleXA) 10 MG tablet 1 tablet Daily.     11/21/2023    melatonin 1 MG tablet Take 3 tablets by mouth At Night As Needed for Sleep.     11/21/2023    montelukast (SINGULAIR) 10 MG tablet Take 1 tablet by mouth Every Night.     11/21/2023    omeprazole (priLOSEC) 40 MG capsule Take 1 capsule by mouth Daily.     11/21/2023    propranolol LA (INDERAL LA) 120 MG 24 hr capsule TAKE 1 CAPSULE EVERY       MORNING 90 capsule 3 11/21/2023    ranolazine (RANEXA) 500 MG 12 hr tablet Take 1 tablet by mouth 2 (Two) Times a Day.     11/21/2023    RESTASIS 0.05 % ophthalmic emulsion Administer 1 drop to both eyes Every 12 (Twelve) Hours.     11/21/2023    diphenhydrAMINE (BENADRYL) 25 mg capsule Take 1 capsule by mouth Every 6 (Six) Hours As Needed for Itching.     Unknown    Polyvinyl Alcohol-Povidone PF (HYPOTEARS) 1.4-0.6 % ophthalmic solution Administer 1-2 drops to both eyes As Needed for Wound Care.     Unknown    SUMAtriptan (IMITREX) 50 MG tablet Take 1 tablet by mouth As Needed for Migraine.     Unknown         Allergies:  Contrast dye (echo or unknown ct/mr), Hydrocodone, Oxycodone, Sulfamethoxazole-trimethoprim, Cyclobenzaprine, Loratadine, and Tramadol hcl     Review of Systems   General:  positive for fatigue and tiredness  Eyes: No redness  Cardiovascular: No chest pain, no palpitations        Physical Exam     General:      well developed, well nourished, in no acute distress.    Head:      normocephalic and atraumatic.    Eyes:      PERRL/EOM intact, conjunctivae and sclerae clear without nystagmus.    Neck:      no  thyromegaly, trachea central with normal respiratory effort  Lungs:      clear  bilaterally to auscultation.    Heart:       regular rate and rhythm, S1, S2 without murmurs, rubs, or gallops  Skin:      intact without lesions or rashes.    Psych:      alert and cooperative; normal mood and affect; normal attention span and concentration.             CBC          Results from last 7 days   Lab Units 11/22/23  0112 11/21/23 2215   WBC 10*3/mm3 6.80 7.10   HEMOGLOBIN g/dL 11.5* 13.6   PLATELETS 10*3/mm3 193 230      BMP         Results from last 7 days   Lab Units 11/22/23  0112 11/21/23  2215   SODIUM mmol/L 141 140   POTASSIUM mmol/L 3.9 4.0   CHLORIDE mmol/L 104 102   CO2 mmol/L 28.0 23.0   BUN mg/dL 14 13   CREATININE mg/dL 0.76 0.76   GLUCOSE mg/dL 105* 122*      Radiology(recent) XR Chest 1 View     Result Date: 11/21/2023  Impression: No acute abnormality Electronically Signed: Anmol Goncalves DO  11/21/2023 10:38 PM EST  Workstation ID: [id number]                       Assessment plan     Intermittent symptoms of palpitations with sustained arrhythmia and was noted to be in atrial flutter and converted to sinus rhythm  Stop aspirin  Start Eliquis 5 mg twice daily  Patient educated about options for arrhythmia and patient to be scheduled for an outpatient EP study and ablation and orders are placed  Right bundle branch block  Normal EF without ischemia  Normal TSH  Prior cardiac catheterization without stenosis  History of loop recorder with battery depletion        Discussed with the discharging team and the patient      Electronically signed by Branden Hernandez MD, 01/24/24, 8:40 AM EST.

## 2024-01-24 NOTE — Clinical Note
The left DP pulse is +1. The right DP pulse is +2. The left PT pulse is +2. The right PT pulse is +1.

## 2024-01-25 LAB
ACT BLD: 147 SECONDS (ref 89–137)
ACT BLD: 304 SECONDS (ref 89–137)
ACT BLD: 314 SECONDS (ref 89–137)
ACT BLD: 320 SECONDS (ref 89–137)

## 2024-01-25 RX ORDER — PROPRANOLOL HYDROCHLORIDE 120 MG/1
120 CAPSULE, EXTENDED RELEASE ORAL EVERY MORNING
Qty: 90 CAPSULE | Refills: 3 | Status: SHIPPED | OUTPATIENT
Start: 2024-01-25

## 2024-01-25 NOTE — TELEPHONE ENCOUNTER
Rx Refill Note  Requested Prescriptions     Pending Prescriptions Disp Refills    propranolol LA (INDERAL LA) 120 MG 24 hr capsule [Pharmacy Med Name: PROPRANOLOL  CAP 120MG ER] 90 capsule 3     Sig: TAKE 1 CAPSULE EVERY       MORNING      Last office visit with prescribing clinician: 5/9/2023   Last telemedicine visit with prescribing clinician: Visit date not found   Next office visit with prescribing clinician: Visit date not found                         Would you like a call back once the refill request has been completed: [] Yes [] No    If the office needs to give you a call back, can they leave a voicemail: [] Yes [] No    Sandra Corrales MA  01/25/24, 07:43 EST

## 2024-01-26 NOTE — PROGRESS NOTES
Cardiology Office Follow Up Visit      Primary Care Provider:  Pedro Rivera MD    Reason for f/u:     Hospital Follow-Up AF      Subjective       History of Present Illness       Joie Godinez is a 78 y.o. female seen in clinic today for hospital follow-up.    Patient has past cardiac history of paroxysmal atrial fibrillation and aflutter s/p recent aflutter ablation 1/2024 (anticoagulated with Eliquis).  Preop MARIA ISABEL showed an EF of 56 to 60% with grade 1 diastolic dysfunction and mild MR/TR.  Cath 9/2022 showed normal coronaries.  She is also s/p loop recorder in 2017 with battery depletion.  PMH includes HTN, HLD, and GERD.    Patient has no complaints today.  She reports that her groin site has healed okay and she has returned to her normal activities.  She has had no further palpitations.  She denies postop shortness of breath or chest pain.      ASSESSMENT/PLAN:      Diagnoses and all orders for this visit:    1. Atrial flutter, paroxysmal (Primary)  -     ECG 12 Lead    Other orders  -     apixaban (ELIQUIS) 5 MG tablet tablet; Take 1 tablet by mouth Every 12 (Twelve) Hours. Indications: Atrial Fibrillation  Dispense: 180 tablet; Refill: 3            MEDICAL DECISION MAKING:    EKG shows sinus rhythm today on beta-blocker.  Continue on Eliquis for anticoagulation.      RTC in 1 month with Dr. Hernandez.    Past Medical History:   Diagnosis Date    Acid reflux     Anemia     Bradycardia     History of loop recorder     approximately 2017 or thereabouts    Hyperlipidemia     Macular degeneration     Mobility poor     cane/walker PRN    Palpitations        Past Surgical History:   Procedure Laterality Date    CARDIAC CATHETERIZATION      CARDIAC CATHETERIZATION N/A 09/02/2022    Procedure: Left and Right Heart Cath with Coronary Angiography;  Surgeon: Otis Patel MD;  Location: [facility] CATH INVASIVE LOCATION;  Service: Cardiovascular;  Laterality: N/A;    CARDIAC ELECTROPHYSIOLOGY PROCEDURE Right 1/24/2024     Procedure: Ablation atrial flutter;  Surgeon: Branden Hernandez MD;  Location: [facility] INVASIVE LOCATION;  Service: Cardiovascular;  Laterality: Right;    FOOT SURGERY      GALLBLADDER SURGERY      HYSTERECTOMY      TOTAL HIP ARTHROPLASTY Bilateral          Current Outpatient Medications:     apixaban (ELIQUIS) 5 MG tablet tablet, Take 1 tablet by mouth Every 12 (Twelve) Hours. Indications: Atrial Fibrillation, Disp: 180 tablet, Rfl: 3    calcium carbonate (OS-RAVEN) 600 MG tablet, Take 1 tablet by mouth 2 (Two) Times a Day With Meals., Disp: , Rfl:     cholecalciferol (VITAMIN D3) 1000 units tablet, Take 1 tablet by mouth Daily., Disp: , Rfl:     citalopram (CeleXA) 10 MG tablet, Take 1 tablet by mouth Daily., Disp: , Rfl:     diphenhydrAMINE (BENADRYL) 25 mg capsule, Take 1 capsule by mouth Every 6 (Six) Hours As Needed for Itching., Disp: , Rfl:     melatonin 1 MG tablet, Take 3 tablets by mouth At Night As Needed for Sleep., Disp: , Rfl:     montelukast (SINGULAIR) 10 MG tablet, Take 1 tablet by mouth Every Night., Disp: , Rfl:     omeprazole (priLOSEC) 40 MG capsule, Take 1 capsule by mouth Daily., Disp: , Rfl:     Polyvinyl Alcohol-Povidone PF (HYPOTEARS) 1.4-0.6 % ophthalmic solution, Administer 1-2 drops to both eyes As Needed for Wound Care., Disp: , Rfl:     propranolol LA (INDERAL LA) 120 MG 24 hr capsule, TAKE 1 CAPSULE EVERY       MORNING, Disp: 90 capsule, Rfl: 3    ranolazine (RANEXA) 500 MG 12 hr tablet, Take 1 tablet by mouth 2 (Two) Times a Day., Disp: , Rfl:     RESTASIS 0.05 % ophthalmic emulsion, Administer 1 drop to both eyes Every 12 (Twelve) Hours., Disp: , Rfl:     SUMAtriptan (IMITREX) 50 MG tablet, Take 1 tablet by mouth As Needed for Migraine., Disp: , Rfl:     Social History     Socioeconomic History    Marital status:    Tobacco Use    Smoking status: Former     Passive exposure: Past    Smokeless tobacco: Former   Vaping Use    Vaping Use: Never used   Substance and  "Sexual Activity    Alcohol use: Yes     Alcohol/week: 7.0 standard drinks of alcohol     Types: 7 Glasses of wine per week     Comment: wine with dinner    Drug use: Never    Sexual activity: Defer       Family History   Problem Relation Age of Onset    Hypertension Mother     Asthma Mother        The following portions of the patient's history were reviewed and updated as appropriate: allergies, current medications, past family history, past medical history, past social history, past surgical history and problem list.    ROS  /66   Pulse 72   Ht 165.1 cm (65\")   Wt 76.7 kg (169 lb)   SpO2 97%   BMI 28.12 kg/m² .  Objective     Physical Exam    Physical Exam:  Neuro:  CV:  Resp:  GI:  Ext:  Pysch: AAOx3, no gross deficits  S1S2 RRR, no murmur  Non-labored, CTA  BS+, abd soft  Pedal pulses palp, no edema  Calm and cooperative       Procedures    EKG ordered by and reviewed by me in office  EKG shows sinus rhythm with an incomplete right bundle branch block.         "

## 2024-02-06 ENCOUNTER — OFFICE VISIT (OUTPATIENT)
Dept: CARDIOLOGY | Facility: CLINIC | Age: 79
End: 2024-02-06
Payer: MEDICARE

## 2024-02-06 VITALS
SYSTOLIC BLOOD PRESSURE: 107 MMHG | BODY MASS INDEX: 28.16 KG/M2 | DIASTOLIC BLOOD PRESSURE: 66 MMHG | HEART RATE: 72 BPM | OXYGEN SATURATION: 97 % | WEIGHT: 169 LBS | HEIGHT: 65 IN

## 2024-02-06 DIAGNOSIS — I48.92 ATRIAL FLUTTER, PAROXYSMAL: Primary | ICD-10-CM

## 2024-02-06 PROCEDURE — 93000 ELECTROCARDIOGRAM COMPLETE: CPT | Performed by: NURSE PRACTITIONER

## 2024-02-06 PROCEDURE — 3078F DIAST BP <80 MM HG: CPT | Performed by: NURSE PRACTITIONER

## 2024-02-06 PROCEDURE — 3074F SYST BP LT 130 MM HG: CPT | Performed by: NURSE PRACTITIONER

## 2024-02-06 PROCEDURE — 99213 OFFICE O/P EST LOW 20 MIN: CPT | Performed by: NURSE PRACTITIONER

## 2024-03-22 ENCOUNTER — OFFICE VISIT (OUTPATIENT)
Dept: CARDIOLOGY | Facility: CLINIC | Age: 79
End: 2024-03-22
Payer: MEDICARE

## 2024-03-22 VITALS
SYSTOLIC BLOOD PRESSURE: 136 MMHG | BODY MASS INDEX: 27.82 KG/M2 | WEIGHT: 167 LBS | DIASTOLIC BLOOD PRESSURE: 74 MMHG | HEART RATE: 70 BPM | HEIGHT: 65 IN | OXYGEN SATURATION: 96 %

## 2024-03-22 DIAGNOSIS — R00.2 PALPITATION: ICD-10-CM

## 2024-03-22 DIAGNOSIS — Z98.890 STATUS POST ABLATION OF ATRIAL FIBRILLATION: ICD-10-CM

## 2024-03-22 DIAGNOSIS — I48.0 PAROXYSMAL ATRIAL FIBRILLATION: ICD-10-CM

## 2024-03-22 DIAGNOSIS — I48.3 TYPICAL ATRIAL FLUTTER: Primary | ICD-10-CM

## 2024-03-22 DIAGNOSIS — I10 ESSENTIAL HYPERTENSION: ICD-10-CM

## 2024-03-22 DIAGNOSIS — Z86.79 STATUS POST ABLATION OF ATRIAL FIBRILLATION: ICD-10-CM

## 2024-03-22 PROCEDURE — 1159F MED LIST DOCD IN RCRD: CPT | Performed by: INTERNAL MEDICINE

## 2024-03-22 PROCEDURE — 99214 OFFICE O/P EST MOD 30 MIN: CPT | Performed by: INTERNAL MEDICINE

## 2024-03-22 PROCEDURE — 93000 ELECTROCARDIOGRAM COMPLETE: CPT | Performed by: INTERNAL MEDICINE

## 2024-03-22 PROCEDURE — 3075F SYST BP GE 130 - 139MM HG: CPT | Performed by: INTERNAL MEDICINE

## 2024-03-22 PROCEDURE — 1160F RVW MEDS BY RX/DR IN RCRD: CPT | Performed by: INTERNAL MEDICINE

## 2024-03-22 PROCEDURE — 3078F DIAST BP <80 MM HG: CPT | Performed by: INTERNAL MEDICINE

## 2024-03-22 RX ORDER — OXYBUTYNIN CHLORIDE 5 MG/1
TABLET, EXTENDED RELEASE ORAL
COMMUNITY
Start: 2024-01-11

## 2024-03-22 RX ORDER — AZELASTINE HYDROCHLORIDE 137 UG/1
SPRAY, METERED NASAL
COMMUNITY
Start: 2023-12-26

## 2024-03-22 NOTE — PROGRESS NOTES
CC--- intermittent  atrial arrhythmias     Sub  79-year-old pleasant patient underwent EP study in January 2024 and had right atrial flutter which was ablated followed by induction of AF needing AF cryoablation.  She comes in for follow-up.  Previous cardiac catheterization without significant coronary artery stenosis.  She has prior history of loop recorder implantation which has battery depletion.  Patient has history of acid reflux and history of anemia and no prior history of stroke.  TSH is normal in the past.  MARIA ISABEL revealed normal EF with mild MR with mild to moderate LVH with mild left atrial enlargement          Past Medical History:   Diagnosis Date    Acid reflux     Anemia     Bradycardia     History of loop recorder     approximately 2017 or thereabouts    Hyperlipidemia     Macular degeneration     Mobility poor     cane/walker PRN    Palpitations      Past Surgical History:   Procedure Laterality Date    CARDIAC CATHETERIZATION      CARDIAC CATHETERIZATION N/A 09/02/2022    Procedure: Left and Right Heart Cath with Coronary Angiography;  Surgeon: Otis Patel MD;  Location: [facility] CATH INVASIVE LOCATION;  Service: Cardiovascular;  Laterality: N/A;    CARDIAC ELECTROPHYSIOLOGY PROCEDURE Right 1/24/2024    Procedure: Ablation atrial flutter;  Surgeon: Branden Hernandez MD;  Location: [facility] CATH INVASIVE LOCATION;  Service: Cardiovascular;  Laterality: Right;    FOOT SURGERY      GALLBLADDER SURGERY      HYSTERECTOMY      TOTAL HIP ARTHROPLASTY Bilateral            Physical Exam    General:      well developed, well nourished, in no acute distress.    Head:      normocephalic and atraumatic.    Eyes:      PERRL/EOM intact, conjunctivae and sclerae clear without nystagmus.    Neck:      no  thyromegaly, trachea central with normal respiratory effort  Lungs:      clear bilaterally to auscultation.    Heart:       regular rate and rhythm, S1, S2 without murmurs, rubs, or gallops  Skin:      intact  without lesions or rashes.    Psych:      alert and cooperative; normal mood and affect; normal attention span and concentration.        Assessment and plan    Recurrent symptomatic atrial flutter and atrial fibrillation post ablation currently in sinus rhythm on apixaban  Hypertension with LVH currently treated with propranolol  History of acid reflux on omeprazole  Mild MR and mild left atrial enlargement  Medications reviewed and follow-up appointments made  Eliquis refilled  Stop ranexa    Continue current dosing of propranolol since patient has mild to moderate LVH and hypertension and atrial fibrillation.  Patient to record blood pressure regularly at home and if he Starts having low blood pressure with symptoms, then propranolol dose can be titrated.    Plenty of  oral hydration educated to avoid any orthostatic symptoms      ECG 12 Lead    Date/Time: 3/22/2024 2:00 PM  Performed by: Branden Hernandez MD    Authorized by: Branden Hernandez MD  Comparison: compared with previous ECG   Similar to previous ECG  Rhythm: sinus rhythm  Rate: normal  Conduction: right bundle branch block        Electronically signed by Branden Hernandez MD, 03/22/24, 2:03 PM EDT.

## 2024-07-03 ENCOUNTER — APPOINTMENT (OUTPATIENT)
Dept: GENERAL RADIOLOGY | Facility: HOSPITAL | Age: 79
End: 2024-07-03
Payer: MEDICARE

## 2024-07-03 ENCOUNTER — APPOINTMENT (OUTPATIENT)
Dept: CT IMAGING | Facility: HOSPITAL | Age: 79
End: 2024-07-03
Payer: MEDICARE

## 2024-07-03 ENCOUNTER — HOSPITAL ENCOUNTER (EMERGENCY)
Facility: HOSPITAL | Age: 79
Discharge: HOME | End: 2024-07-04
Attending: EMERGENCY MEDICINE
Payer: MEDICARE

## 2024-07-03 VITALS
RESPIRATION RATE: 15 BRPM | TEMPERATURE: 98.6 F | SYSTOLIC BLOOD PRESSURE: 142 MMHG | DIASTOLIC BLOOD PRESSURE: 62 MMHG | BODY MASS INDEX: 26.66 KG/M2 | OXYGEN SATURATION: 97 % | HEART RATE: 70 BPM | HEIGHT: 65 IN | WEIGHT: 160 LBS

## 2024-07-03 DIAGNOSIS — S63.501A SPRAIN OF RIGHT WRIST, INITIAL ENCOUNTER: ICD-10-CM

## 2024-07-03 DIAGNOSIS — S09.90XA MINOR CLOSED HEAD INJURY: Primary | ICD-10-CM

## 2024-07-03 DIAGNOSIS — S00.83XA CONTUSION OF FACE, INITIAL ENCOUNTER: ICD-10-CM

## 2024-07-03 LAB
GLUCOSE BLDC GLUCOMTR-MCNC: 93 MG/DL (ref 70–105)
HOLD SPECIMEN: NORMAL
HOLD SPECIMEN: NORMAL
WHOLE BLOOD HOLD COAG: NORMAL
WHOLE BLOOD HOLD SPECIMEN: NORMAL

## 2024-07-03 PROCEDURE — 70450 CT HEAD/BRAIN W/O DYE: CPT

## 2024-07-03 PROCEDURE — 73110 X-RAY EXAM OF WRIST: CPT

## 2024-07-03 PROCEDURE — 99284 EMERGENCY DEPT VISIT MOD MDM: CPT

## 2024-07-03 PROCEDURE — 70486 CT MAXILLOFACIAL W/O DYE: CPT

## 2024-07-03 PROCEDURE — 82948 REAGENT STRIP/BLOOD GLUCOSE: CPT

## 2024-07-04 NOTE — DISCHARGE INSTRUCTIONS
Rest tonight.  Follow-up Dr. Rivera, may use Tylenol for pain.  May use ice to sore areas.  Return any new or further symptoms

## 2024-07-04 NOTE — ED PROVIDER NOTES
Subjective   History of Present Illness  79-year-old female presents after she had tripped and fell.  She apparently drank half a bottle of wine tonight, she was reported to have some confusion.   reports she did not have loss of consciousness but was confused as to what happened when she first fell.  This has resolved now.  She complains of some pain to her head and face.  She complains of pain to her right wrist.  No complaints of neck or back pain no chest pain no abdominal pain no hip or leg pain.  No left arm pain.  Review of Systems    Past Medical History:   Diagnosis Date    Acid reflux     Anemia     Bradycardia     History of loop recorder     approximately 2017 or thereabouts    Hyperlipidemia     Macular degeneration     Mobility poor     cane/walker PRN    Palpitations        Allergies   Allergen Reactions    Contrast Dye (Echo Or Unknown Ct/Mr) Unknown (See Comments)    Hydrocodone Unknown (See Comments)    Oxycodone Unknown (See Comments)    Sulfamethoxazole-Trimethoprim Hives    Cyclobenzaprine Other (See Comments)     Off balance    Loratadine Other (See Comments)    Tramadol Hcl Unknown (See Comments)       Past Surgical History:   Procedure Laterality Date    CARDIAC CATHETERIZATION      CARDIAC CATHETERIZATION N/A 09/02/2022    Procedure: Left and Right Heart Cath with Coronary Angiography;  Surgeon: Otis Patel MD;  Location: [facility] CATH INVASIVE LOCATION;  Service: Cardiovascular;  Laterality: N/A;    CARDIAC ELECTROPHYSIOLOGY PROCEDURE Right 1/24/2024    Procedure: Ablation atrial flutter;  Surgeon: Branden Hernandez MD;  Location: [facility] CATH INVASIVE LOCATION;  Service: Cardiovascular;  Laterality: Right;    FOOT SURGERY      GALLBLADDER SURGERY      HYSTERECTOMY      TOTAL HIP ARTHROPLASTY Bilateral        Family History   Problem Relation Age of Onset    Hypertension Mother     Asthma Mother        Social History     Socioeconomic History    Marital status:    Tobacco  Use    Smoking status: Former     Passive exposure: Past    Smokeless tobacco: Former   Vaping Use    Vaping status: Never Used   Substance and Sexual Activity    Alcohol use: Yes     Alcohol/week: 7.0 standard drinks of alcohol     Types: 7 Glasses of wine per week     Comment: wine with dinner    Drug use: Never    Sexual activity: Defer     Prior to Admission medications    Medication Sig Start Date End Date Taking? Authorizing Provider   apixaban (ELIQUIS) 5 MG tablet tablet Take 1 tablet by mouth Every 12 (Twelve) Hours. Indications: Atrial Fibrillation 3/22/24   Branden Hernandez MD   Azelastine HCl 137 MCG/SPRAY solution  12/26/23   Stanford Hernandez MD   calcium carbonate (OS-RAVEN) 600 MG tablet Take 1 tablet by mouth 2 (Two) Times a Day With Meals.    Stanford Hernandez MD   cholecalciferol (VITAMIN D3) 1000 units tablet Take 1 tablet by mouth Daily.    Stanford Hernandez MD   citalopram (CeleXA) 10 MG tablet Take 1 tablet by mouth Daily. 4/10/20   Stanford Hernandez MD   diphenhydrAMINE (BENADRYL) 25 mg capsule Take 1 capsule by mouth Every 6 (Six) Hours As Needed for Itching.  Patient not taking: Reported on 3/22/2024    Stanford Hernandez MD   melatonin 1 MG tablet Take 3 tablets by mouth At Night As Needed for Sleep.    Stanford Hernandez MD   montelukast (SINGULAIR) 10 MG tablet Take 1 tablet by mouth Every Night. 8/29/19   Stanford Hernandez MD   omeprazole (priLOSEC) 40 MG capsule Take 1 capsule by mouth Daily. 8/31/19   Stanford Hernandez MD   oxybutynin XL (DITROPAN-XL) 5 MG 24 hr tablet  1/11/24   Stanford Hernandez MD   Polyvinyl Alcohol-Povidone PF (HYPOTEARS) 1.4-0.6 % ophthalmic solution Administer 1-2 drops to both eyes As Needed for Wound Care.    Stanford Hernandez MD   propranolol LA (INDERAL LA) 120 MG 24 hr capsule TAKE 1 CAPSULE EVERY       MORNING 1/25/24   Otis Patel MD   RESTASIS 0.05 % ophthalmic emulsion Administer 1 drop to both eyes Every 12  (Twelve) Hours. 8/27/19   ProviderStanford MD   SUMAtriptan (IMITREX) 50 MG tablet Take 1 tablet by mouth As Needed for Migraine. 6/26/22   Provider, MD Stanford           Objective   Physical Exam  79-year-old female awake alert.  Generally well-developed well-nourished.  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  She has some bruising to right side of face and around mouth.  There is no facial asymmetry.  She has no complaints of cervical thoracic or lumbar spine pain.  No chest or abdominal pain.  Lungs are clear cardiovascular regular rhythm.  Examination of extremities complains of some mild pain to right wrist she has intact range of motion neurovasc intact.  She has no complaints of other extremity pain.  Neurologic exam GCS 15.  Hands without pronator drift finger-nose intact.  Procedures           ED Course      Results for orders placed or performed during the hospital encounter of 07/03/24   POC Glucose Once    Specimen: Blood   Result Value Ref Range    Glucose 93 70 - 105 mg/dL   Green Top (Gel)   Result Value Ref Range    Extra Tube Hold for add-ons.    Lavender Top   Result Value Ref Range    Extra Tube hold for add-on    Gold Top - SST   Result Value Ref Range    Extra Tube Hold for add-ons.    Light Blue Top   Result Value Ref Range    Extra Tube Hold for add-ons.      CT Head Without Contrast    Result Date: 7/3/2024  Impression: No acute intracranial findings. No acute facial bone fracture. Electronically Signed: Bradford Barnhart MD  7/3/2024 11:05 PM EDT  Workstation ID: [id number]    CT Facial Bones Without Contrast    Result Date: 7/3/2024  Impression: No acute intracranial findings. No acute facial bone fracture. Electronically Signed: Bradford Barnhart MD  7/3/2024 11:05 PM EDT  Workstation ID: [id number]    XR Wrist 3+ View Right    Result Date: 7/3/2024  Impression: 1.There are underlying degenerative changes with chondrocalcinosis. 2.Osseous demineralization.  "Electronically Signed: Bart Crouch MD  7/3/2024 9:19 PM EDT  Workstation ID: [id number]   Medications - No data to display  /63   Pulse 74   Temp 98.6 °F (37 °C) (Oral)   Resp 15   Ht 165.1 cm (65\")   Wt 72.6 kg (160 lb)   SpO2 98%   BMI 26.63 kg/m²                                             Medical Decision Making  Amount and/or Complexity of Data Reviewed  Radiology: ordered.    Chart review she had cardiology evaluation March 22 for atrial flutter paroxysmal atrial fibrillation status post atrial fibrillation ablation.  Differential contusion, fracture, closed head injury  EKG: Patient is noted to be in sinus rhythm on monitor  My x-ray review and interpretation: CT scan of head shows no acute intracranial abnormality.  No fracture or hemorrhage.  CT scan of face reveals near complete opacification of right maxillary sinus which was similar to a prior CT from 2021.  No facial fracture noted.X-ray of right wrist reveals no fracture or dislocation.  There is some degenerative changes with chondrocalcinosis.  Discussion patient had repeat evaluation.  Neurologically normal.  She was high risk due to being on Eliquis and fall.  There is no fracture.  Findings were discussed with her and .  She states she normally drinks a couple glasses of wine and this was nothing unusual.  She is awake alert at this time.  She was discharged.  Advised can use ice to sores.  Tylenol for pain naman primary provider, return any new or worsening symptoms.    Final diagnoses:   Minor closed head injury   Contusion of face, initial encounter   Sprain of right wrist, initial encounter       ED Disposition  ED Disposition       ED Disposition   Discharge    Condition   Stable    Comment   --               Pedro Rivera MD  [address] [id number] [phone]               Medication List      No changes were made to your prescriptions during this visit.            Jordan Schuster, " MD  07/03/24 2505

## 2024-07-11 ENCOUNTER — TELEPHONE (OUTPATIENT)
Dept: CARDIOLOGY | Facility: CLINIC | Age: 79
End: 2024-07-11

## 2024-07-11 NOTE — TELEPHONE ENCOUNTER
Caller: Joie Godinez    Relationship: Self    Best call back number: [phone]    What is the best time to reach you: ANY    Who are you requesting to speak with (clinical staff, provider,  specific staff member): CLINICAL    What was the call regarding: UNKNOWN PATIENT CAN'T ACCESS VM. APPOINTMENT WAS CANCELED TODAY BY PROVIDER, PLEASE ADVISE.    Is it okay if the provider responds through MyChart: CALL BACK

## 2024-07-25 ENCOUNTER — TELEPHONE (OUTPATIENT)
Dept: CARDIOLOGY | Facility: CLINIC | Age: 79
End: 2024-07-25

## 2024-07-25 NOTE — TELEPHONE ENCOUNTER
Caller: Joie Godinez    Relationship to patient: Self    Best call back number:[phone]    Chief complaint: FAINTED, FELL, ED VISIT AND PCP REQUESTED CARDIO APPOINTMENT    Type of visit: HOSPITAL FU    Requested date:ASAP    If rescheduling, when is the original appointment: 10.24.24    Additional notes:PATIENT PASSED OUT AND FELL. AMBULANCE WAS CALLED AND SHE WAS TAKEN TO ED 07.03.24. PCP REQUESTED PATIENT TO BE SEEN BY DR RAVEN CASTRO. PLEASE ADVISE.

## 2024-07-30 ENCOUNTER — OFFICE VISIT (OUTPATIENT)
Dept: CARDIOLOGY | Facility: CLINIC | Age: 79
End: 2024-07-30
Payer: MEDICARE

## 2024-07-30 VITALS
BODY MASS INDEX: 26.99 KG/M2 | OXYGEN SATURATION: 93 % | WEIGHT: 162 LBS | SYSTOLIC BLOOD PRESSURE: 143 MMHG | HEART RATE: 70 BPM | DIASTOLIC BLOOD PRESSURE: 82 MMHG | HEIGHT: 65 IN

## 2024-07-30 DIAGNOSIS — R55 SYNCOPE AND COLLAPSE: Primary | ICD-10-CM

## 2024-07-30 PROCEDURE — 1159F MED LIST DOCD IN RCRD: CPT | Performed by: NURSE PRACTITIONER

## 2024-07-30 PROCEDURE — 3079F DIAST BP 80-89 MM HG: CPT | Performed by: NURSE PRACTITIONER

## 2024-07-30 PROCEDURE — 3077F SYST BP >= 140 MM HG: CPT | Performed by: NURSE PRACTITIONER

## 2024-07-30 PROCEDURE — 1160F RVW MEDS BY RX/DR IN RCRD: CPT | Performed by: NURSE PRACTITIONER

## 2024-07-30 PROCEDURE — 99214 OFFICE O/P EST MOD 30 MIN: CPT | Performed by: NURSE PRACTITIONER

## 2024-07-30 NOTE — PROGRESS NOTES
[facility] CARDIOLOGY      REASON FOR FOLLOW-UP:  Follow-up ED visit          Chief Complaint   Patient presents with    Heart Problem         Dear Pedro Rivera MD        History of Present Illness   It was my pleasure to see Joie Godinez in acute office visit.  She is an 79-year-old female with known history of right atrial flutter status post ablation, loop recorder in situ with battery depletion, history of anemia, valvular heart disease consisting of mild MR. She presents today in acute office visit for syncope.    Joie was evaluated [facility] in the emergency department on 7/3/2024.  According to provider note-patient reported that she tripped and fell, reported drinking half a bottle of wine prior.  The  reported no loss of consciousness but was confused afterwards and did not recall the episode.  Head CT, CT facial bones and right wrist x-ray forearm with no acute fine.  She was instructed to follow-up with cardiology.    The story I received today from the patient and  is that she had consumed 2 glasses of wine with dinner.  She used the restroom (did not have a bowel movement), walked into the kitchen and past her  who witnessed the event.  He states she suddenly fell face first on the floor.  The patient denies any prodrome prior to the event.  She was confused immediately afterwards.  The patient stated that she has had intermittent headache that has not worsened since the event.  She denies any palpitations, chest discomfort, edema.  She does report intermittent shortness of breath that is not out of character for her.  She is a prior smoker but quit about 40 years ago.  She denies any focal deficit or vision changes.    She also presents an extensive blood pressure log and readings are within good limits.      ASSESSMENT:  Syncope and collapse  Facial contusion  ?  Concussion  History of atrial flutter status post  ablation  Hyperlipidemia    PLAN:  Although the patient's symptoms sound vasovagal in nature, she did have a sudden syncopal episode which does not exclude possible underlying arrhythmia.  I will place ambulatory monitor for 2 weeks.  The patient was advised to decrease his EtOH intake, increase water intake, arise slowly from a seated position.  Follow-up after testing        CHF Guideline Directed Medical Therapy  Beta Blocker:   ARNI/ACE/ARB:   SGLT 2 inhibitors:   Diuretics:   Aldosterone Antagonist:   Vasodilators & Nitrates:       There are no diagnoses linked to this encounter.      The following portions of the patient's history were reviewed and updated as appropriate: allergies, current medications, past family history, past medical history, past social history, past surgical history, and problem list.    REVIEW OF SYSTEMS:    Review of Systems   Cardiovascular:  Positive for syncope.   Neurological:  Positive for headaches.   All other systems reviewed and are negative.      Vitals:    07/30/24 1007   BP: 143/82   Pulse: 70   SpO2: 93%         PHYSICAL EXAM:    General: Alert, cooperative, no distress, appears stated age  Head:  Normocephalic, healing bruise right temporal area, mucous membranes moist  Eyes:  Conjunctiva/corneas clear, EOM's intact     Neck:  Supple,  no JVD or bruit     Lungs: Clear to auscultation bilaterally, no wheezes rhonchi rales are noted  Chest wall: No tenderness  Musculoskeletal:   Ambulates freely without assistance  Heart::  Regular rate and rhythm, S1 and S2 normal, no murmur, rub or gallop  Abdomen: Soft, non-tender, nondistended, bowel sounds active, no abdominal bruit  Extremities: No cyanosis, clubbing, or edema   Pulses: 2+ and symmetric all extremities  Skin:  No rashes or lesions  Neuro/psych: A&O x3. CN II through XII are grossly intact with appropriate affect        Past Medical History:   Diagnosis Date    Acid reflux     Anemia     Bradycardia     History of loop  recorder     approximately 2017 or thereabouts    Hyperlipidemia     Macular degeneration     Mobility poor     cane/walker PRN    Palpitations        Past Surgical History:   Procedure Laterality Date    CARDIAC CATHETERIZATION      CARDIAC CATHETERIZATION N/A 09/02/2022    Procedure: Left and Right Heart Cath with Coronary Angiography;  Surgeon: Otis Patel MD;  Location: [facility] CATH INVASIVE LOCATION;  Service: Cardiovascular;  Laterality: N/A;    CARDIAC ELECTROPHYSIOLOGY PROCEDURE Right 1/24/2024    Procedure: Ablation atrial flutter;  Surgeon: Branden Hernandez MD;  Location: [facility] CATH INVASIVE LOCATION;  Service: Cardiovascular;  Laterality: Right;    FOOT SURGERY      GALLBLADDER SURGERY      HYSTERECTOMY      TOTAL HIP ARTHROPLASTY Bilateral          Current Outpatient Medications:     apixaban (ELIQUIS) 5 MG tablet tablet, Take 1 tablet by mouth Every 12 (Twelve) Hours. Indications: Atrial Fibrillation, Disp: 180 tablet, Rfl: 1    Azelastine HCl 137 MCG/SPRAY solution, , Disp: , Rfl:     calcium carbonate (OS-RAVEN) 600 MG tablet, Take 1 tablet by mouth 2 (Two) Times a Day With Meals., Disp: , Rfl:     cholecalciferol (VITAMIN D3) 1000 units tablet, Take 1 tablet by mouth Daily., Disp: , Rfl:     citalopram (CeleXA) 10 MG tablet, Take 1 tablet by mouth Daily., Disp: , Rfl:     diphenhydrAMINE (BENADRYL) 25 mg capsule, Take 1 capsule by mouth Every 6 (Six) Hours As Needed for Itching., Disp: , Rfl:     melatonin 1 MG tablet, Take 3 tablets by mouth At Night As Needed for Sleep., Disp: , Rfl:     montelukast (SINGULAIR) 10 MG tablet, Take 1 tablet by mouth Every Night., Disp: , Rfl:     omeprazole (priLOSEC) 40 MG capsule, Take 1 capsule by mouth Daily., Disp: , Rfl:     oxybutynin XL (DITROPAN-XL) 5 MG 24 hr tablet, , Disp: , Rfl:     Polyvinyl Alcohol-Povidone PF (HYPOTEARS) 1.4-0.6 % ophthalmic solution, Administer 1-2 drops to both eyes As Needed for Wound Care., Disp: , Rfl:     propranolol LA  "(INDERAL LA) 120 MG 24 hr capsule, TAKE 1 CAPSULE EVERY       MORNING, Disp: 90 capsule, Rfl: 3    RESTASIS 0.05 % ophthalmic emulsion, Administer 1 drop to both eyes Every 12 (Twelve) Hours., Disp: , Rfl:     SUMAtriptan (IMITREX) 50 MG tablet, Take 1 tablet by mouth As Needed for Migraine., Disp: , Rfl:     Allergies   Allergen Reactions    Contrast Dye (Echo Or Unknown Ct/Mr) Unknown (See Comments)    Hydrocodone Unknown (See Comments)    Oxycodone Unknown (See Comments)    Sulfamethoxazole-Trimethoprim Hives    Cyclobenzaprine Other (See Comments)     Off balance    Loratadine Other (See Comments)    Tramadol Hcl Unknown (See Comments)       Family History   Problem Relation Age of Onset    Hypertension Mother     Asthma Mother        Social History     Tobacco Use    Smoking status: Former     Passive exposure: Past    Smokeless tobacco: Former   Substance Use Topics    Alcohol use: Yes     Alcohol/week: 7.0 standard drinks of alcohol     Types: 7 Glasses of wine per week     Comment: wine with dinner           Current Electrocardiogram:  Procedures        EMR Dragon/Transcription:   \"Dictated utilizing Dragon dictation\".     Copied text in this note has been reviewed by me and is accurate as of 07/30/24.    "

## 2024-08-01 ENCOUNTER — HOSPITAL ENCOUNTER (OUTPATIENT)
Dept: CARDIOLOGY | Facility: HOSPITAL | Age: 79
Discharge: HOME | End: 2024-08-01
Payer: MEDICARE

## 2024-08-01 DIAGNOSIS — R55 SYNCOPE AND COLLAPSE: ICD-10-CM

## 2024-09-23 RX ORDER — PROPRANOLOL HYDROCHLORIDE 120 MG/1
120 CAPSULE, EXTENDED RELEASE ORAL EVERY MORNING
Qty: 90 CAPSULE | Refills: 3 | OUTPATIENT
Start: 2024-09-23

## 2024-09-24 ENCOUNTER — OFFICE VISIT (OUTPATIENT)
Dept: CARDIOLOGY | Facility: CLINIC | Age: 79
End: 2024-09-24
Payer: MEDICARE

## 2024-09-24 VITALS
HEART RATE: 74 BPM | HEIGHT: 65 IN | WEIGHT: 161 LBS | DIASTOLIC BLOOD PRESSURE: 73 MMHG | BODY MASS INDEX: 26.82 KG/M2 | SYSTOLIC BLOOD PRESSURE: 123 MMHG

## 2024-09-24 DIAGNOSIS — I10 ESSENTIAL HYPERTENSION: ICD-10-CM

## 2024-09-24 DIAGNOSIS — I48.92 ATRIAL FLUTTER, PAROXYSMAL: ICD-10-CM

## 2024-09-24 DIAGNOSIS — R55 SYNCOPE AND COLLAPSE: Primary | ICD-10-CM

## 2024-09-24 PROCEDURE — 1160F RVW MEDS BY RX/DR IN RCRD: CPT | Performed by: NURSE PRACTITIONER

## 2024-09-24 PROCEDURE — 3074F SYST BP LT 130 MM HG: CPT | Performed by: NURSE PRACTITIONER

## 2024-09-24 PROCEDURE — 3078F DIAST BP <80 MM HG: CPT | Performed by: NURSE PRACTITIONER

## 2024-09-24 PROCEDURE — 99213 OFFICE O/P EST LOW 20 MIN: CPT | Performed by: NURSE PRACTITIONER

## 2024-09-24 PROCEDURE — 1159F MED LIST DOCD IN RCRD: CPT | Performed by: NURSE PRACTITIONER

## 2024-10-24 ENCOUNTER — OFFICE VISIT (OUTPATIENT)
Dept: CARDIOLOGY | Facility: CLINIC | Age: 79
End: 2024-10-24
Payer: MEDICARE

## 2024-10-24 VITALS
HEART RATE: 81 BPM | HEIGHT: 65 IN | WEIGHT: 166 LBS | SYSTOLIC BLOOD PRESSURE: 131 MMHG | DIASTOLIC BLOOD PRESSURE: 79 MMHG | BODY MASS INDEX: 27.66 KG/M2

## 2024-10-24 DIAGNOSIS — Z98.890 STATUS POST ABLATION OF ATRIAL FIBRILLATION: ICD-10-CM

## 2024-10-24 DIAGNOSIS — I10 ESSENTIAL HYPERTENSION: ICD-10-CM

## 2024-10-24 DIAGNOSIS — Z86.79 STATUS POST ABLATION OF ATRIAL FIBRILLATION: ICD-10-CM

## 2024-10-24 DIAGNOSIS — R00.2 PALPITATION: ICD-10-CM

## 2024-10-24 DIAGNOSIS — I48.3 TYPICAL ATRIAL FLUTTER: ICD-10-CM

## 2024-10-24 DIAGNOSIS — I48.0 PAROXYSMAL ATRIAL FIBRILLATION: Primary | ICD-10-CM

## 2024-10-24 PROCEDURE — 1160F RVW MEDS BY RX/DR IN RCRD: CPT | Performed by: INTERNAL MEDICINE

## 2024-10-24 PROCEDURE — 99214 OFFICE O/P EST MOD 30 MIN: CPT | Performed by: INTERNAL MEDICINE

## 2024-10-24 PROCEDURE — 1159F MED LIST DOCD IN RCRD: CPT | Performed by: INTERNAL MEDICINE

## 2024-10-24 PROCEDURE — 93000 ELECTROCARDIOGRAM COMPLETE: CPT | Performed by: INTERNAL MEDICINE

## 2024-10-24 PROCEDURE — 3075F SYST BP GE 130 - 139MM HG: CPT | Performed by: INTERNAL MEDICINE

## 2024-10-24 PROCEDURE — 3078F DIAST BP <80 MM HG: CPT | Performed by: INTERNAL MEDICINE

## 2024-10-24 RX ORDER — PROPRANOLOL HYDROCHLORIDE 120 MG/1
120 CAPSULE, EXTENDED RELEASE ORAL EVERY MORNING
Qty: 90 CAPSULE | Refills: 3 | Status: SHIPPED | OUTPATIENT
Start: 2024-10-24

## 2024-10-24 NOTE — PROGRESS NOTES
CC--- intermittent  atrial arrhythmias     Sub  79-year-old pleasant patient has recurrent atrial arrhythmias and underwent EP study and ablation of right atrial flutter and cryo AF ablation January 2024 and comes in for follow-up.  Previous cardiac catheterization without significant coronary artery stenosis and patient had a prior loop recorder implantation with battery depletion.  She does have history of prior stroke and prior history of anemia and acid reflux and TSH is normal and MARIA ISABEL in the past revealed normal LVEF with mild MR and mild to moderate LVH with mild left atrial enlargement.    Patient recently had a mechanical fall and had prior history of falls and most recent fall has happened after long time without any loss of consciousness.  She complains of bleeding locally from the site which she fell.  She complains of left leg edema.  And no redness      Past Medical History:   Diagnosis Date    Acid reflux     Anemia     Bradycardia     History of loop recorder     approximately 2017 or thereabouts    Hyperlipidemia     Macular degeneration     Mobility poor     cane/walker PRN    Palpitations      Past Surgical History:   Procedure Laterality Date    CARDIAC CATHETERIZATION      CARDIAC CATHETERIZATION N/A 09/02/2022    Procedure: Left and Right Heart Cath with Coronary Angiography;  Surgeon: Otis Patel MD;  Location: [facility] CATH INVASIVE LOCATION;  Service: Cardiovascular;  Laterality: N/A;    CARDIAC ELECTROPHYSIOLOGY PROCEDURE Right 1/24/2024    Procedure: Ablation atrial flutter;  Surgeon: Branden Hernandez MD;  Location: [facility] CATH INVASIVE LOCATION;  Service: Cardiovascular;  Laterality: Right;    FOOT SURGERY      GALLBLADDER SURGERY      HYSTERECTOMY      TOTAL HIP ARTHROPLASTY Bilateral            Physical Exam    General:      well developed, well nourished, in no acute distress.    Head:      normocephalic and atraumatic.    Eyes:      PERRL/EOM intact, conjunctivae and sclerae  clear without nystagmus.    Neck:      no  thyromegaly, trachea central with normal respiratory effort  Lungs:      clear bilaterally to auscultation.    Heart:       regular rate and rhythm, S1, S2 without murmurs, rubs, or gallops  Skin:      intact without lesions or rashes.    Psych:      alert and cooperative; normal mood and affect; normal attention span and concentration.          Assessment and plan    History of right atrial flutter and atrial fibrillation with prior ablation currently in sinus rhythm currently on apixaban  Hypertension with LVH currently treated with propranolol  History of acid reflux on Prilosec   Mild MR mild left atrial enlargement  Medications reviewed and follow-up appointments made  Patient has history of falls and if she continues to fall in future, patient can be a good candidate for watchman to avoid long-term anticoagulation because of bleeding risk.  Propranolol Refilled        ECG 12 Lead    Date/Time: 10/24/2024 3:19 PM  Performed by: Branden Hernandez MD    Authorized by: Branden Hernandez MD  Comparison: compared with previous ECG   Similar to previous ECG  Rhythm: sinus rhythm  Rate: normal  Conduction: incomplete right bundle branch block  QRS axis: normal      Electronically signed by Branden Hernandez MD, 10/24/24, 3:19 PM EDT.

## 2024-11-26 ENCOUNTER — HOSPITAL ENCOUNTER (OUTPATIENT)
Dept: CARDIOLOGY | Facility: HOSPITAL | Age: 79
Discharge: HOME | End: 2024-11-26
Payer: MEDICARE

## 2024-11-26 ENCOUNTER — PRE-ADMISSION TESTING (OUTPATIENT)
Dept: PREADMISSION TESTING | Facility: HOSPITAL | Age: 79
End: 2024-11-26
Payer: MEDICARE

## 2024-11-26 ENCOUNTER — HOSPITAL ENCOUNTER (OUTPATIENT)
Dept: GENERAL RADIOLOGY | Facility: HOSPITAL | Age: 79
Discharge: HOME | End: 2024-11-26
Payer: MEDICARE

## 2024-11-26 ENCOUNTER — LAB (OUTPATIENT)
Dept: LAB | Facility: HOSPITAL | Age: 79
End: 2024-11-26
Payer: MEDICARE

## 2024-11-26 VITALS
BODY MASS INDEX: 27.16 KG/M2 | WEIGHT: 163 LBS | DIASTOLIC BLOOD PRESSURE: 74 MMHG | OXYGEN SATURATION: 94 % | RESPIRATION RATE: 18 BRPM | HEART RATE: 72 BPM | SYSTOLIC BLOOD PRESSURE: 144 MMHG | TEMPERATURE: 97.8 F | HEIGHT: 65 IN

## 2024-11-26 LAB
ABO GROUP BLD: NORMAL
ALBUMIN SERPL-MCNC: 3.8 G/DL (ref 3.5–5.2)
ALBUMIN/GLOB SERPL: 1.7 G/DL
ALP SERPL-CCNC: 54 U/L (ref 39–117)
ALT SERPL W P-5'-P-CCNC: 11 U/L (ref 1–33)
ANION GAP SERPL CALCULATED.3IONS-SCNC: 8 MMOL/L (ref 5–15)
AST SERPL-CCNC: 16 U/L (ref 1–32)
BACTERIA UR QL AUTO: NORMAL /HPF
BASOPHILS # BLD AUTO: 0.04 10*3/MM3 (ref 0–0.2)
BASOPHILS NFR BLD AUTO: 0.7 % (ref 0–1.5)
BILIRUB SERPL-MCNC: 0.9 MG/DL (ref 0–1.2)
BILIRUB UR QL STRIP: NEGATIVE
BLD GP AB SCN SERPL QL: NEGATIVE
BUN SERPL-MCNC: 9 MG/DL (ref 8–23)
BUN/CREAT SERPL: 13.8 (ref 7–25)
CALCIUM SPEC-SCNC: 9 MG/DL (ref 8.6–10.5)
CHLORIDE SERPL-SCNC: 104 MMOL/L (ref 98–107)
CLARITY UR: CLEAR
CO2 SERPL-SCNC: 26 MMOL/L (ref 22–29)
COLOR UR: YELLOW
CREAT SERPL-MCNC: 0.65 MG/DL (ref 0.57–1)
DEPRECATED RDW RBC AUTO: 40.6 FL (ref 37–54)
EGFRCR SERPLBLD CKD-EPI 2021: 88.6 ML/MIN/1.73
EOSINOPHIL # BLD AUTO: 0.3 10*3/MM3 (ref 0–0.4)
EOSINOPHIL NFR BLD AUTO: 5 % (ref 0.3–6.2)
ERYTHROCYTE [DISTWIDTH] IN BLOOD BY AUTOMATED COUNT: 12.1 % (ref 12.3–15.4)
GLOBULIN UR ELPH-MCNC: 2.2 GM/DL
GLUCOSE SERPL-MCNC: 75 MG/DL (ref 65–99)
GLUCOSE UR STRIP-MCNC: NEGATIVE MG/DL
HBA1C MFR BLD: 5.1 % (ref 4.8–5.6)
HCT VFR BLD AUTO: 34.5 % (ref 34–46.6)
HGB BLD-MCNC: 11.2 G/DL (ref 12–15.9)
HGB UR QL STRIP.AUTO: NEGATIVE
HOLD SPECIMEN: NORMAL
HYALINE CASTS UR QL AUTO: NORMAL /LPF
IMM GRANULOCYTES # BLD AUTO: 0.02 10*3/MM3 (ref 0–0.05)
IMM GRANULOCYTES NFR BLD AUTO: 0.3 % (ref 0–0.5)
INR PPP: 1.38 (ref 0.9–1.1)
KETONES UR QL STRIP: NEGATIVE
LEUKOCYTE ESTERASE UR QL STRIP.AUTO: ABNORMAL
LYMPHOCYTES # BLD AUTO: 1.57 10*3/MM3 (ref 0.7–3.1)
LYMPHOCYTES NFR BLD AUTO: 26 % (ref 19.6–45.3)
MCH RBC QN AUTO: 29.9 PG (ref 26.6–33)
MCHC RBC AUTO-ENTMCNC: 32.5 G/DL (ref 31.5–35.7)
MCV RBC AUTO: 92.2 FL (ref 79–97)
MONOCYTES # BLD AUTO: 0.54 10*3/MM3 (ref 0.1–0.9)
MONOCYTES NFR BLD AUTO: 8.9 % (ref 5–12)
MRSA DNA SPEC QL NAA+PROBE: NORMAL
NEUTROPHILS NFR BLD AUTO: 3.57 10*3/MM3 (ref 1.7–7)
NEUTROPHILS NFR BLD AUTO: 59.1 % (ref 42.7–76)
NITRITE UR QL STRIP: NEGATIVE
NRBC BLD AUTO-RTO: 0 /100 WBC (ref 0–0.2)
PH UR STRIP.AUTO: 6 [PH] (ref 5–8)
PLATELET # BLD AUTO: 226 10*3/MM3 (ref 140–450)
PMV BLD AUTO: 9.7 FL (ref 6–12)
POTASSIUM SERPL-SCNC: 3.8 MMOL/L (ref 3.5–5.2)
PROT SERPL-MCNC: 6 G/DL (ref 6–8.5)
PROT UR QL STRIP: NEGATIVE
PROTHROMBIN TIME: 17.1 SECONDS (ref 11.7–14.2)
QT INTERVAL: 402 MS
QTC INTERVAL: 443 MS
RBC # BLD AUTO: 3.74 10*6/MM3 (ref 3.77–5.28)
RBC # UR STRIP: NORMAL /HPF
REF LAB TEST METHOD: NORMAL
RH BLD: POSITIVE
SODIUM SERPL-SCNC: 138 MMOL/L (ref 136–145)
SP GR UR STRIP: 1.01 (ref 1–1.03)
SQUAMOUS #/AREA URNS HPF: NORMAL /HPF
T&S EXPIRATION DATE: NORMAL
UROBILINOGEN UR QL STRIP: ABNORMAL
WBC # UR STRIP: NORMAL /HPF
WBC NRBC COR # BLD AUTO: 6.04 10*3/MM3 (ref 3.4–10.8)

## 2024-11-26 PROCEDURE — 87641 MR-STAPH DNA AMP PROBE: CPT

## 2024-11-26 PROCEDURE — 86901 BLOOD TYPING SEROLOGIC RH(D): CPT

## 2024-11-26 PROCEDURE — 81001 URINALYSIS AUTO W/SCOPE: CPT

## 2024-11-26 PROCEDURE — 86900 BLOOD TYPING SEROLOGIC ABO: CPT

## 2024-11-26 PROCEDURE — 83036 HEMOGLOBIN GLYCOSYLATED A1C: CPT

## 2024-11-26 PROCEDURE — 36415 COLL VENOUS BLD VENIPUNCTURE: CPT | Performed by: ORTHOPAEDIC SURGERY

## 2024-11-26 PROCEDURE — 93005 ELECTROCARDIOGRAM TRACING: CPT | Performed by: ORTHOPAEDIC SURGERY

## 2024-11-26 PROCEDURE — 86850 RBC ANTIBODY SCREEN: CPT

## 2024-11-26 PROCEDURE — 71046 X-RAY EXAM CHEST 2 VIEWS: CPT

## 2024-11-26 PROCEDURE — 85610 PROTHROMBIN TIME: CPT

## 2024-11-26 PROCEDURE — 85025 COMPLETE CBC W/AUTO DIFF WBC: CPT | Performed by: ORTHOPAEDIC SURGERY

## 2024-11-26 PROCEDURE — 80053 COMPREHEN METABOLIC PANEL: CPT

## 2024-11-26 NOTE — PAT
Dr Hoover notified that pt has not been given stop date for Eliquis.  Dr hoover office to notify pt

## 2024-11-29 ENCOUNTER — ANESTHESIA EVENT (OUTPATIENT)
Dept: PERIOP | Facility: HOSPITAL | Age: 79
End: 2024-11-29
Payer: MEDICARE

## 2024-11-29 ENCOUNTER — HOSPITAL ENCOUNTER (OUTPATIENT)
Facility: HOSPITAL | Age: 79
Discharge: HOME | End: 2024-12-01
Attending: ORTHOPAEDIC SURGERY | Admitting: ORTHOPAEDIC SURGERY
Payer: MEDICARE

## 2024-11-29 ENCOUNTER — ANESTHESIA (OUTPATIENT)
Dept: PERIOP | Facility: HOSPITAL | Age: 79
End: 2024-11-29
Payer: MEDICARE

## 2024-11-29 ENCOUNTER — APPOINTMENT (OUTPATIENT)
Dept: GENERAL RADIOLOGY | Facility: HOSPITAL | Age: 79
End: 2024-11-29
Payer: MEDICARE

## 2024-11-29 PROBLEM — Z96.659 KNEE JOINT REPLACEMENT STATUS: Status: ACTIVE | Noted: 2024-11-29

## 2024-11-29 PROBLEM — M17.11 UNILATERAL PRIMARY OSTEOARTHRITIS, RIGHT KNEE: Status: ACTIVE | Noted: 2024-11-29

## 2024-11-29 PROCEDURE — C1713 ANCHOR/SCREW BN/BN,TIS/BN: HCPCS | Performed by: ORTHOPAEDIC SURGERY

## 2024-11-29 PROCEDURE — C1776 JOINT DEVICE (IMPLANTABLE): HCPCS | Performed by: ORTHOPAEDIC SURGERY

## 2024-11-29 PROCEDURE — 25010000002 ROPIVACAINE PER 1 MG: Performed by: ANESTHESIOLOGY

## 2024-11-29 PROCEDURE — A9270 NON-COVERED ITEM OR SERVICE: HCPCS | Performed by: ORTHOPAEDIC SURGERY

## 2024-11-29 PROCEDURE — 25010000002 HYDROMORPHONE 1 MG/ML SOLUTION: Performed by: ORTHOPAEDIC SURGERY

## 2024-11-29 PROCEDURE — 25010000002 KETOROLAC TROMETHAMINE PER 15 MG: Performed by: ORTHOPAEDIC SURGERY

## 2024-11-29 PROCEDURE — 25010000002 HYDROMORPHONE 1 MG/ML SOLUTION: Performed by: NURSE ANESTHETIST, CERTIFIED REGISTERED

## 2024-11-29 PROCEDURE — 25010000002 VANCOMYCIN 1 G RECONSTITUTED SOLUTION: Performed by: ORTHOPAEDIC SURGERY

## 2024-11-29 PROCEDURE — 63710000001 ACETAMINOPHEN 500 MG TABLET: Performed by: ORTHOPAEDIC SURGERY

## 2024-11-29 PROCEDURE — 25010000002 ONDANSETRON PER 1 MG: Performed by: NURSE ANESTHETIST, CERTIFIED REGISTERED

## 2024-11-29 PROCEDURE — 25810000003 LACTATED RINGERS PER 1000 ML: Performed by: NURSE ANESTHETIST, CERTIFIED REGISTERED

## 2024-11-29 PROCEDURE — 25010000002 CEFAZOLIN PER 500 MG: Performed by: ORTHOPAEDIC SURGERY

## 2024-11-29 PROCEDURE — 73560 X-RAY EXAM OF KNEE 1 OR 2: CPT

## 2024-11-29 PROCEDURE — 25010000002 ROPIVACAINE PER 1 MG: Performed by: ORTHOPAEDIC SURGERY

## 2024-11-29 PROCEDURE — 25010000002 FENTANYL CITRATE (PF) 50 MCG/ML SOLUTION: Performed by: ANESTHESIOLOGY

## 2024-11-29 PROCEDURE — 25010000002 CLONIDINE PER 1 MG: Performed by: ORTHOPAEDIC SURGERY

## 2024-11-29 PROCEDURE — 63710000001 MELOXICAM 15 MG TABLET: Performed by: ORTHOPAEDIC SURGERY

## 2024-11-29 PROCEDURE — 25010000002 PROPOFOL 10 MG/ML EMULSION: Performed by: NURSE ANESTHETIST, CERTIFIED REGISTERED

## 2024-11-29 PROCEDURE — 25810000003 LACTATED RINGERS PER 1000 ML: Performed by: ORTHOPAEDIC SURGERY

## 2024-11-29 PROCEDURE — 25010000002 LIDOCAINE PF 1% 1 % SOLUTION: Performed by: NURSE ANESTHETIST, CERTIFIED REGISTERED

## 2024-11-29 PROCEDURE — 25010000002 EPINEPHRINE 1 MG/ML SOLUTION: Performed by: ORTHOPAEDIC SURGERY

## 2024-11-29 DEVICE — IMPLANTABLE DEVICE: Type: IMPLANTABLE DEVICE | Status: FUNCTIONAL

## 2024-11-29 DEVICE — CMT BONE PALACOS R HI/VISC 1X40: Type: IMPLANTABLE DEVICE | Site: KNEE | Status: FUNCTIONAL

## 2024-11-29 DEVICE — DEV CONTRL TISS STRATAFIX SPIRAL MNCRYL UD 3/0 PLS 30CM: Type: IMPLANTABLE DEVICE | Site: KNEE | Status: FUNCTIONAL

## 2024-11-29 DEVICE — JOURNEY II BCS XLPE ARTICULAR                                    INSERT SIZE 3-4 RIGHT 10MM
Type: IMPLANTABLE DEVICE | Site: KNEE | Status: FUNCTIONAL
Brand: JOURNEY

## 2024-11-29 DEVICE — JOURNEY TIBIAL BASEPLATE NONPOROUS                                    RIGHT SIZE 3
Type: IMPLANTABLE DEVICE | Site: KNEE | Status: FUNCTIONAL
Brand: JOURNEY

## 2024-11-29 DEVICE — JOURNEY II BCS FEMORAL OXINIUM                                    RIGHT SIZE 5
Type: IMPLANTABLE DEVICE | Site: KNEE | Status: FUNCTIONAL
Brand: JOURNEY

## 2024-11-29 DEVICE — DEV WND/CLS CONTRL TISS STRATAFIX SYMM PDS PLS CTX 60CM VIL: Type: IMPLANTABLE DEVICE | Site: KNEE | Status: FUNCTIONAL

## 2024-11-29 DEVICE — JOURNEY 7.5 ROUND RESURF PAT 32MM STANDARD
Type: IMPLANTABLE DEVICE | Site: KNEE | Status: FUNCTIONAL
Brand: JOURNEY

## 2024-11-29 RX ORDER — TRANEXAMIC ACID 100 MG/ML
INJECTION, SOLUTION INTRAVENOUS AS NEEDED
Status: DISCONTINUED | OUTPATIENT
Start: 2024-11-29 | End: 2024-11-29 | Stop reason: HOSPADM

## 2024-11-29 RX ORDER — PROPRANOLOL HYDROCHLORIDE 60 MG/1
120 CAPSULE, EXTENDED RELEASE ORAL EVERY MORNING
Status: DISCONTINUED | OUTPATIENT
Start: 2024-11-30 | End: 2024-12-01 | Stop reason: HOSPADM

## 2024-11-29 RX ORDER — SUMATRIPTAN 50 MG/1
50 TABLET, FILM COATED ORAL AS NEEDED
Status: DISCONTINUED | OUTPATIENT
Start: 2024-11-29 | End: 2024-12-01 | Stop reason: HOSPADM

## 2024-11-29 RX ORDER — ASPIRIN 81 MG/1
81 TABLET ORAL EVERY 12 HOURS SCHEDULED
Status: DISCONTINUED | OUTPATIENT
Start: 2024-11-30 | End: 2024-11-29

## 2024-11-29 RX ORDER — OXYCODONE HYDROCHLORIDE 5 MG/1
5 TABLET ORAL EVERY 4 HOURS PRN
Status: DISCONTINUED | OUTPATIENT
Start: 2024-11-29 | End: 2024-12-01 | Stop reason: HOSPADM

## 2024-11-29 RX ORDER — CITALOPRAM HYDROBROMIDE 20 MG/1
10 TABLET ORAL DAILY
Status: DISCONTINUED | OUTPATIENT
Start: 2024-11-29 | End: 2024-11-29

## 2024-11-29 RX ORDER — SODIUM CHLORIDE 0.9 % (FLUSH) 0.9 %
10 SYRINGE (ML) INJECTION AS NEEDED
Status: DISCONTINUED | OUTPATIENT
Start: 2024-11-29 | End: 2024-11-29 | Stop reason: HOSPADM

## 2024-11-29 RX ORDER — HYDRALAZINE HYDROCHLORIDE 20 MG/ML
5 INJECTION INTRAMUSCULAR; INTRAVENOUS
Status: DISCONTINUED | OUTPATIENT
Start: 2024-11-29 | End: 2024-11-29 | Stop reason: HOSPADM

## 2024-11-29 RX ORDER — DIPHENHYDRAMINE HYDROCHLORIDE 50 MG/ML
12.5 INJECTION INTRAMUSCULAR; INTRAVENOUS
Status: DISCONTINUED | OUTPATIENT
Start: 2024-11-29 | End: 2024-11-29 | Stop reason: HOSPADM

## 2024-11-29 RX ORDER — MELOXICAM 15 MG/1
15 TABLET ORAL DAILY
Status: DISCONTINUED | OUTPATIENT
Start: 2024-11-29 | End: 2024-12-01 | Stop reason: HOSPADM

## 2024-11-29 RX ORDER — TRANEXAMIC ACID 10 MG/ML
1000 INJECTION, SOLUTION INTRAVENOUS ONCE
Status: COMPLETED | OUTPATIENT
Start: 2024-11-29 | End: 2024-11-29

## 2024-11-29 RX ORDER — ACETAMINOPHEN 325 MG/1
650 TABLET ORAL ONCE AS NEEDED
Status: DISCONTINUED | OUTPATIENT
Start: 2024-11-29 | End: 2024-11-29 | Stop reason: HOSPADM

## 2024-11-29 RX ORDER — ACETAMINOPHEN 500 MG
2 TABLET ORAL NIGHTLY
COMMUNITY
Start: 2024-11-29

## 2024-11-29 RX ORDER — ONDANSETRON 2 MG/ML
4 INJECTION INTRAMUSCULAR; INTRAVENOUS ONCE AS NEEDED
Status: COMPLETED | OUTPATIENT
Start: 2024-11-29 | End: 2024-11-29

## 2024-11-29 RX ORDER — LABETALOL HYDROCHLORIDE 5 MG/ML
5 INJECTION, SOLUTION INTRAVENOUS
Status: DISCONTINUED | OUTPATIENT
Start: 2024-11-29 | End: 2024-11-29 | Stop reason: HOSPADM

## 2024-11-29 RX ORDER — FENTANYL CITRATE 50 UG/ML
50 INJECTION, SOLUTION INTRAMUSCULAR; INTRAVENOUS
Status: DISCONTINUED | OUTPATIENT
Start: 2024-11-29 | End: 2024-11-29 | Stop reason: HOSPADM

## 2024-11-29 RX ORDER — LIDOCAINE HYDROCHLORIDE 10 MG/ML
INJECTION, SOLUTION EPIDURAL; INFILTRATION; INTRACAUDAL; PERINEURAL AS NEEDED
Status: DISCONTINUED | OUTPATIENT
Start: 2024-11-29 | End: 2024-11-29 | Stop reason: SURG

## 2024-11-29 RX ORDER — DIPHENHYDRAMINE HCL 25 MG
2 CAPSULE ORAL NIGHTLY
COMMUNITY
Start: 2019-10-01

## 2024-11-29 RX ORDER — ACETAMINOPHEN 500 MG
1000 TABLET ORAL ONCE
Status: COMPLETED | OUTPATIENT
Start: 2024-11-29 | End: 2024-11-29

## 2024-11-29 RX ORDER — SODIUM CHLORIDE, SODIUM LACTATE, POTASSIUM CHLORIDE, CALCIUM CHLORIDE 600; 310; 30; 20 MG/100ML; MG/100ML; MG/100ML; MG/100ML
20 INJECTION, SOLUTION INTRAVENOUS ONCE
Status: COMPLETED | OUTPATIENT
Start: 2024-11-29 | End: 2024-11-29

## 2024-11-29 RX ORDER — CEFAZOLIN SODIUM 1 G/3ML
INJECTION, POWDER, FOR SOLUTION INTRAMUSCULAR; INTRAVENOUS AS NEEDED
Status: DISCONTINUED | OUTPATIENT
Start: 2024-11-29 | End: 2024-11-29 | Stop reason: HOSPADM

## 2024-11-29 RX ORDER — ONDANSETRON 4 MG/1
4 TABLET, ORALLY DISINTEGRATING ORAL EVERY 6 HOURS PRN
Status: DISCONTINUED | OUTPATIENT
Start: 2024-11-29 | End: 2024-12-01 | Stop reason: HOSPADM

## 2024-11-29 RX ORDER — DEXMEDETOMIDINE HYDROCHLORIDE 100 UG/ML
INJECTION, SOLUTION INTRAVENOUS
Status: COMPLETED | OUTPATIENT
Start: 2024-11-29 | End: 2024-11-29

## 2024-11-29 RX ORDER — ACETAMINOPHEN 500 MG
1000 TABLET ORAL EVERY 6 HOURS
Status: DISCONTINUED | OUTPATIENT
Start: 2024-11-29 | End: 2024-12-01 | Stop reason: HOSPADM

## 2024-11-29 RX ORDER — SODIUM CHLORIDE, SODIUM LACTATE, POTASSIUM CHLORIDE, CALCIUM CHLORIDE 600; 310; 30; 20 MG/100ML; MG/100ML; MG/100ML; MG/100ML
INJECTION, SOLUTION INTRAVENOUS CONTINUOUS PRN
Status: DISCONTINUED | OUTPATIENT
Start: 2024-11-29 | End: 2024-11-29 | Stop reason: SURG

## 2024-11-29 RX ORDER — ROPIVACAINE HYDROCHLORIDE 5 MG/ML
INJECTION, SOLUTION EPIDURAL; INFILTRATION; PERINEURAL
Status: COMPLETED | OUTPATIENT
Start: 2024-11-29 | End: 2024-11-29

## 2024-11-29 RX ORDER — LIDOCAINE HYDROCHLORIDE 10 MG/ML
0.5 INJECTION, SOLUTION EPIDURAL; INFILTRATION; INTRACAUDAL; PERINEURAL ONCE AS NEEDED
Status: DISCONTINUED | OUTPATIENT
Start: 2024-11-29 | End: 2024-11-29 | Stop reason: HOSPADM

## 2024-11-29 RX ORDER — IPRATROPIUM BROMIDE AND ALBUTEROL SULFATE 2.5; .5 MG/3ML; MG/3ML
3 SOLUTION RESPIRATORY (INHALATION) ONCE AS NEEDED
Status: DISCONTINUED | OUTPATIENT
Start: 2024-11-29 | End: 2024-11-29 | Stop reason: HOSPADM

## 2024-11-29 RX ORDER — ONDANSETRON 2 MG/ML
4 INJECTION INTRAMUSCULAR; INTRAVENOUS EVERY 6 HOURS PRN
Status: DISCONTINUED | OUTPATIENT
Start: 2024-11-29 | End: 2024-12-01 | Stop reason: HOSPADM

## 2024-11-29 RX ORDER — NALOXONE HCL 0.4 MG/ML
0.4 VIAL (ML) INJECTION AS NEEDED
Status: DISCONTINUED | OUTPATIENT
Start: 2024-11-29 | End: 2024-11-29 | Stop reason: HOSPADM

## 2024-11-29 RX ORDER — FENTANYL CITRATE 50 UG/ML
INJECTION, SOLUTION INTRAMUSCULAR; INTRAVENOUS
Status: COMPLETED | OUTPATIENT
Start: 2024-11-29 | End: 2024-11-29

## 2024-11-29 RX ORDER — PROPOFOL 10 MG/ML
VIAL (ML) INTRAVENOUS AS NEEDED
Status: DISCONTINUED | OUTPATIENT
Start: 2024-11-29 | End: 2024-11-29 | Stop reason: SURG

## 2024-11-29 RX ORDER — OXYCODONE HYDROCHLORIDE 5 MG/1
10 TABLET ORAL EVERY 4 HOURS PRN
Status: DISCONTINUED | OUTPATIENT
Start: 2024-11-29 | End: 2024-12-01 | Stop reason: HOSPADM

## 2024-11-29 RX ORDER — VANCOMYCIN HYDROCHLORIDE 1 G/20ML
INJECTION, POWDER, LYOPHILIZED, FOR SOLUTION INTRAVENOUS AS NEEDED
Status: DISCONTINUED | OUTPATIENT
Start: 2024-11-29 | End: 2024-11-29 | Stop reason: HOSPADM

## 2024-11-29 RX ORDER — NALOXONE HCL 0.4 MG/ML
0.1 VIAL (ML) INJECTION
Status: DISCONTINUED | OUTPATIENT
Start: 2024-11-29 | End: 2024-12-01 | Stop reason: HOSPADM

## 2024-11-29 RX ORDER — FENTANYL CITRATE 50 UG/ML
25 INJECTION, SOLUTION INTRAMUSCULAR; INTRAVENOUS
Status: DISCONTINUED | OUTPATIENT
Start: 2024-11-29 | End: 2024-11-29

## 2024-11-29 RX ORDER — DOCUSATE SODIUM 100 MG/1
100 CAPSULE, LIQUID FILLED ORAL 2 TIMES DAILY PRN
Status: DISCONTINUED | OUTPATIENT
Start: 2024-11-29 | End: 2024-12-01 | Stop reason: HOSPADM

## 2024-11-29 RX ADMIN — ACETAMINOPHEN 1000 MG: 500 TABLET, FILM COATED ORAL at 16:32

## 2024-11-29 RX ADMIN — FENTANYL CITRATE 25 MCG: 50 INJECTION, SOLUTION INTRAMUSCULAR; INTRAVENOUS at 10:57

## 2024-11-29 RX ADMIN — SODIUM CHLORIDE, SODIUM LACTATE, POTASSIUM CHLORIDE, AND CALCIUM CHLORIDE: .6; .31; .03; .02 INJECTION, SOLUTION INTRAVENOUS at 10:43

## 2024-11-29 RX ADMIN — LIDOCAINE HYDROCHLORIDE 50 MG: 10 INJECTION, SOLUTION EPIDURAL; INFILTRATION; INTRACAUDAL; PERINEURAL at 10:49

## 2024-11-29 RX ADMIN — DEXMEDETOMIDINE HYDROCHLORIDE 40 MCG: 100 INJECTION, SOLUTION INTRAVENOUS at 09:54

## 2024-11-29 RX ADMIN — ONDANSETRON 4 MG: 2 INJECTION INTRAMUSCULAR; INTRAVENOUS at 11:40

## 2024-11-29 RX ADMIN — SODIUM CHLORIDE 2000 MG: 900 INJECTION INTRAVENOUS at 18:26

## 2024-11-29 RX ADMIN — PROPOFOL 40 MG: 10 INJECTION, EMULSION INTRAVENOUS at 11:02

## 2024-11-29 RX ADMIN — FENTANYL CITRATE 50 MCG: 50 INJECTION, SOLUTION INTRAMUSCULAR; INTRAVENOUS at 11:05

## 2024-11-29 RX ADMIN — PROPOFOL 50 MG: 10 INJECTION, EMULSION INTRAVENOUS at 11:05

## 2024-11-29 RX ADMIN — HYDROMORPHONE HYDROCHLORIDE 0.5 MG: 1 INJECTION, SOLUTION INTRAMUSCULAR; INTRAVENOUS; SUBCUTANEOUS at 12:03

## 2024-11-29 RX ADMIN — PROPOFOL 110 MG: 10 INJECTION, EMULSION INTRAVENOUS at 10:49

## 2024-11-29 RX ADMIN — HYDROMORPHONE HYDROCHLORIDE 1 MG: 1 INJECTION, SOLUTION INTRAMUSCULAR; INTRAVENOUS; SUBCUTANEOUS at 20:42

## 2024-11-29 RX ADMIN — FENTANYL CITRATE 25 MCG: 50 INJECTION, SOLUTION INTRAMUSCULAR; INTRAVENOUS at 11:02

## 2024-11-29 RX ADMIN — SODIUM CHLORIDE, SODIUM LACTATE, POTASSIUM CHLORIDE, CALCIUM CHLORIDE 20 ML/HR: 20; 30; 600; 310 INJECTION, SOLUTION INTRAVENOUS at 09:04

## 2024-11-29 RX ADMIN — ROPIVACAINE HYDROCHLORIDE 55 ML: 5 INJECTION EPIDURAL; INFILTRATION; PERINEURAL at 09:54

## 2024-11-29 RX ADMIN — SODIUM CHLORIDE 2000 MG: 900 INJECTION INTRAVENOUS at 10:35

## 2024-11-29 RX ADMIN — ACETAMINOPHEN 1000 MG: 500 TABLET, FILM COATED ORAL at 22:05

## 2024-11-29 RX ADMIN — PROPOFOL 100 MCG/KG/MIN: 10 INJECTION, EMULSION INTRAVENOUS at 10:51

## 2024-11-29 RX ADMIN — ACETAMINOPHEN 1000 MG: 500 TABLET, FILM COATED ORAL at 09:55

## 2024-11-29 RX ADMIN — TRANEXAMIC ACID 1000 MG: 10 INJECTION, SOLUTION INTRAVENOUS at 11:31

## 2024-11-29 RX ADMIN — HYDROMORPHONE HYDROCHLORIDE 0.5 MG: 1 INJECTION, SOLUTION INTRAMUSCULAR; INTRAVENOUS; SUBCUTANEOUS at 11:58

## 2024-11-29 RX ADMIN — FENTANYL CITRATE 50 MCG: 50 INJECTION, SOLUTION INTRAMUSCULAR; INTRAVENOUS at 09:44

## 2024-11-29 RX ADMIN — MELOXICAM 15 MG: 15 TABLET ORAL at 16:32

## 2024-11-29 RX ADMIN — TRANEXAMIC ACID 1000 MG: 10 INJECTION, SOLUTION INTRAVENOUS at 10:44

## 2024-11-29 NOTE — OP NOTE
ROBOTIC Total Knee Replacement Operative Note  Dr. LEONARDO Sewell II  [phone]    PATIENT NAME: Joie Godinez  MRN: [id number]  : 1945 AGE: 79 y.o. GENDER: female  DATE OF OPERATION: 2024  PREOPERATIVE DIAGNOSIS: End Stage Arthritis  POSTOPERATIVE DIAGNOSIS: Same  OPERATION PERFORMED: Right Robotic Assisted Total Knee Arthroplasty  SURGEON: Elias Sewell MD  Circulator: Nirmala Collazo RN; Stan Rosario RN  Scrub Person: Tylor Soriano  Vendor Representative: Sara Chino  Assistant: Garcia Sultana CSFA  ANESTHESIA: Spinal with Block  ASSISTANT: Garcia Sultana. This case would not have been possible without another set of skilled surgical hands for retraction, use of instrumentation, and general assistance.  This assistance was vital to the success of the case.   ESTIMATED BLOOD LOSS: 100cc  SPONGE AND NEEDLE COUNT: Correct  INDICATIONS:   A discussion of operative versus nonoperative treatment was had with the patient and they failed conservative management. They elected to undergo total knee arthroplasty. The risks of surgery were discussed and included the risk of anesthesia, infection, damage to neurovascular structures, implant loosening/failure, fracture, hardware prominence, continued pain, early failure, the need for further procedures, medical complications, and others. No guarantees were made. The patient wished to proceed with surgery and a surgical consent was signed.    COMPONENTS:   Journey II BCS Oxinium Femoral Component: Size 5  Journey II Tibial Baseplate: 3   Posterior Stabilized Insert: 10  Patella: 32mm    PERTINENT FINDINGS: Degenerative Arthritis    DETAILS OF PROCEDURE:  The patient was met in the preoperative area. The site was marked. The consent and H&P were reviewed. The patient was then wheeled back to the operative suite and transferred to the operative table. The patient underwent anesthesia. A tourniquet was placed on the upper thigh. Surgical alcohol  was used to thoroughly clean the entire operative extremity.     The leg was then prepped in the normal sterile fashion and surgical space suits were used for the entire operative team. New outer gloves were used by all sterile surgical team members after final draping. After a surgical timeout, the tourniquet was inflated.     I began by inserting 2 pins into the tibial and femoral shafts and attaching the tibial and femoral robotic .    In flexion, a midline knee incision was utilized centered on the patella and ending medial to the tibial tubercle. Dissection was carried down to the knee capsule. A medial parapatellar ararthrotomy was completed. The patellar fat pad was excised. The MCL was minimally elevated to gain adequate exposure to the knee.  The suprapatellar fat pad was also excised.  The patella was subluxed laterally. The patella was held vertical using 2 clamps, and was then cut using a saw. The patella was then sized, and the lug holes were drilled. Excess patellar bone was removed using a saw. The patella was then protected during this case using the metal patella shield.    The ACL remnant, anterior horn of the lateral meniscus, and PCL were then resected.  Next I proceeded with a standard mapping of the knee including all relevant anatomic positions, range of motion, and stressing of ligaments.  I then planned out the knee including implant sizes, rotation, and bony resection to appropriately balance the knee as needed.      After the mapping and planning was complete, I turned my attention to the distal femur.  Using the bur, I was able to remove the distal femoral bone and create the initial lug holes.  I then used the punch to create the pinholes for the 5-in-1 cutting block.  The 5-in-1 cutting block was then snapped into place and pinned.  I used a saw to resect the anterior posterior and chamfer cuts.  These were all removed using a rongeur.    I then turned my attention to the tibia.   Retractors were placed appropriately.  Using the robot for guidance, a cutting jig was attached to the tibia using 2 pins.  This was done just above the level of measured resection.  I then used a saw to cut the tibia and remove this bone.  At that point, I was able to use the bur to resect down to the actual intended target tibial resection line.  This was verified to be accurate afterwards on the robot screen.    At that point, the remaining meniscus and osteophytes were removed.  I then attached the femoral component which was well-seated. The femoral BCS box was then prepared for.  I then trialed the knee.  Any additional bony resection and/or soft tissue releases were then noted and performed at that time to fully balance the knee.    We next turned our attention back to the tibia to finish the tibial preparation. The tibia was measured and sized. The tibial baseplate was aligned with the rotation from the trialing process and verified to be positioned near the medial third of the tibial tubercle. The tibial surface was then prepared for the keel.     The knee was thoroughly irrigated with sterile saline using a pulse-lavage system while the final tibial baseplate, femoral component and patellar component were opened. Cement was prepared and mixed using standard techniques. Outer gloves were changed before implant handling to ensure no soft tissue or oily material was exposed to the surfaces of the final implants. The bony knee surfaces were dried and the implants were cemented in place, starting with the tibia, then the femur and finally the patella. Excess cement was removed at each step. A trial poly was utilized during cementation for compression. The tourniquet was taken down and adequate hemostasis was achieved. The knee was thoroughly irrigated once again.     The soft tissues about the knee were then injected with an anesthetic cocktail. Care was taken to avoid the peroneal nerve and the neurovascular  "bundle posteriorly. The cement was allowed to harden.  While the cement was hardening, I did remove all 4 pins those sites were closed.    After the cement was fully set, the knee was ranged with various thickness of polyethylene trials to ensure that the balance was appropriate after robotic resection. The knee was inspected for excess cement, which was removed. The real poly, of corresponding thickness was then opened and inserted into the knee. One final range of motion and stability test showed the knee to be in good condition with a well tracking patellar component.    The knee capsule was then closed after applying 1 g vancomycin.  The knee was then closed in layers.  A sterile dressing was applied.    The patient was awoken from anesthesia, moved to the [facility] and taken to the recovery room in stable condition. Sponge and needle count were correct. There were no complications. Patient tolerated the procedure well.    R \"Zev\" Donato DURAN MD  Orthopaedic Surgery  [facility]  [phone]                "

## 2024-11-29 NOTE — ANESTHESIA PREPROCEDURE EVALUATION
Anesthesia Evaluation     Patient summary reviewed   no history of anesthetic complications:   NPO Solid Status: > 8 hours  NPO Liquid Status: > 8 hours           Airway   Dental      Pulmonary    (+) a smoker Former,shortness of breath  Cardiovascular     ECG reviewed    (+) hypertension, dysrhythmias Atrial Fib, Atrial Flutter, hyperlipidemia  (-) CAD, angina      Neuro/Psych  (+) dizziness/light headedness  GI/Hepatic/Renal/Endo    (+) GERD    Musculoskeletal     Abdominal    Substance History      OB/GYN          Other                      Anesthesia Plan    ASA 3     spinal and general   total IV anesthesia  (Last dose Eliquis 2 days ago- neuraxial contraindicated if <72 hours)  intravenous induction     Anesthetic plan, risks, benefits, and alternatives have been provided, discussed and informed consent has been obtained with: patient.    Plan discussed with CRNA.      CODE STATUS:

## 2024-11-29 NOTE — ANESTHESIA PROCEDURE NOTES
Peripheral Block      Patient reassessed immediately prior to procedure    Patient location during procedure: pre-op  Start time: 11/29/2024 9:44 AM  Stop time: 11/29/2024 9:54 AM  Reason for block: at surgeon's request and post-op pain management  Performed by  Anesthesiologist: Domenica Lai MD  Preanesthetic Checklist  Completed: patient identified, IV checked, site marked, risks and benefits discussed, surgical consent, monitors and equipment checked, pre-op evaluation and timeout performed  Prep:  Pt Position: supine  Sterile barriers:alcohol skin prep, cap, gloves, mask and washed/disinfected hands  Prep: ChloraPrep  Patient monitoring: blood pressure monitoring, continuous pulse oximetry and EKG  Procedure    Sedation: yes  Performed under: local infiltration  Guidance:ultrasound guided    ULTRASOUND INTERPRETATION.  Using ultrasound guidance a 20 G gauge needle was placed in close proximity to the nerve, at which point, under ultrasound guidance anesthetic was injected in the area of the nerve and spread of the anesthesia was seen on ultrasound in close proximity thereto.  There were no abnormalities seen on ultrasound; a digital image was taken; and the patient tolerated the procedure with no complications. Images:still images obtained, printed/placed on chart    Laterality:right  Block Type:adductor canal block and iPack  Injection Technique:single-shot  Needle Type:echogenic  Needle Gauge:20 G    Sedation medications used: fentaNYL citrate (PF) (SUBLIMAZE) injection - Intravenous   50 mcg - 11/29/2024 9:44:00 AM  Medications Used: dexmedetomidine HCl (PRECEDEX) injection - Perineural   40 mcg - 11/29/2024 9:54:00 AM  ropivacaine (NAROPIN) 0.5 % injection - Perineural   55 mL - 11/29/2024 9:54:00 AM      Post Assessment  Injection Assessment: negative aspiration for heme, no paresthesia on injection and incremental injection  Patient Tolerance:comfortable throughout block  Complications:no  Additional  Notes  Skin anesthetized with 1% Lidocaine.  Using 20 G, 4 inch stimuplex echogenic needle, 30 mL of Local was injected with serial aspirations under continuous ultrasound guidance into adductor canal.  No heme aspirated.  Needle tip visualized throughout.  Good spread noted.  No complications.  No bleeding noted.  25 mL then injected between popliteal artery and capsule of knee posteriorly for iPACK.   Performed by: Domenica Lai MD

## 2024-11-29 NOTE — PLAN OF CARE
Goal Outcome Evaluation:         Patient to SIPS from PACU, resting, family at bedside, care plan started

## 2024-11-29 NOTE — ANESTHESIA POSTPROCEDURE EVALUATION
Patient: Joie Godinez    Procedure Summary       Date: 11/29/24 Room / Location: [facility] OR 02 / [facility] MAIN OR    Anesthesia Start: 1043 Anesthesia Stop: 1208    Procedure: TOTAL KNEE ARTHROPLASTY WITH CORI ROBOT (Right: Knee) Diagnosis:       Unilateral primary osteoarthritis, right knee      (Unilateral primary osteoarthritis, right knee [M17.11])    Surgeons: Elias Sewell II, MD Provider: Domenica Lai MD    Anesthesia Type: spinal, general ASA Status: 3            Anesthesia Type: spinal, general    Vitals  Vitals Value Taken Time   /57 11/29/24 1242   Temp 98.6 °F (37 °C) 11/29/24 1205   Pulse 62 11/29/24 1244   Resp 11 11/29/24 1235   SpO2 99 % 11/29/24 1244   Vitals shown include unfiled device data.        Post Anesthesia Care and Evaluation    Patient location during evaluation: PACU  Patient participation: complete - patient participated  Level of consciousness: awake and alert  Pain management: satisfactory to patient    Airway patency: patent  Anesthetic complications: No anesthetic complications  PONV Status: none  Cardiovascular status: acceptable  Respiratory status: acceptable  Hydration status: acceptable

## 2024-11-29 NOTE — ANESTHESIA PROCEDURE NOTES
Airway  Urgency: elective    Date/Time: 11/29/2024 10:50 AM  Airway not difficult    General Information and Staff    Patient location during procedure: OR  CRNA/CAA: Keli Davis CRNA    Indications and Patient Condition  Indications for airway management: airway protection    Preoxygenated: yes  Mask difficulty assessment: 0 - not attempted    Final Airway Details  Final airway type: supraglottic airway      Successful airway: I-gel  Size 4     Number of attempts at approach: 1  Assessment: lips, teeth, and gum same as pre-op

## 2024-11-29 NOTE — H&P
Orthopaedic Surgery  History & Physical For Elective Total Knee  Dr. LEONARDO Sewell II  [phone]    HPI:  Patient is a 79 y.o. Not  or  female who presents with End-stage arthritis of the right knee. They failed conservative treatment of their knee pain and a thorough discussion of the risks and benefits of surgery was had. The patient wishes to continue with elective total knee replacement, they were scheduled and are here for surgery. They did get medical clearance as well as a thorough preoperative workup.    MEDICAL HISTORY  Past Medical History:   Diagnosis Date   • Acid reflux    • Afib    • Anemia    • Blind right eye    • Bradycardia    • History of loop recorder     approximately 2017 or thereabouts   • Hyperlipidemia    • Macular degeneration    • Mobility poor     cane/walker PRN   • Palpitations      Past Surgical History:   Procedure Laterality Date   • CARDIAC CATHETERIZATION     • CARDIAC CATHETERIZATION N/A 09/02/2022    Procedure: Left and Right Heart Cath with Coronary Angiography;  Surgeon: Otis Patel MD;  Location: [facility] CATH INVASIVE LOCATION;  Service: Cardiovascular;  Laterality: N/A;   • CARDIAC ELECTROPHYSIOLOGY PROCEDURE Right 1/24/2024    Procedure: Ablation atrial flutter;  Surgeon: Branden Hernandez MD;  Location: [facility] CATH INVASIVE LOCATION;  Service: Cardiovascular;  Laterality: Right;   • FOOT SURGERY     • GALLBLADDER SURGERY     • HYSTERECTOMY     • TOTAL HIP ARTHROPLASTY Bilateral      Prior to Admission medications    Medication Sig Start Date End Date Taking? Authorizing Provider   acetaminophen (TYLENOL) 500 MG tablet Take 2 tablets by mouth Every Night. For sleep   Yes Stanford Heranndez MD   Azelastine HCl 137 MCG/SPRAY solution Administer 2 sprays into the nostril(s) as directed by provider 2 (Two) Times a Day. 12/26/23  Yes Stanford Hernandez MD   calcium carbonate (OS-RAVEN) 600 MG tablet Take 1 tablet by mouth 2 (Two) Times a Day With  Meals.   Yes Stanford Hernandez MD   cholecalciferol (VITAMIN D3) 1000 units tablet Take 1 tablet by mouth Daily.   Yes Stanford Hernandez MD   citalopram (CeleXA) 10 MG tablet Take 1 tablet by mouth Every Evening. 4/10/20  Yes Stanford Hernandez MD   diphenhydrAMINE (BENADRYL) 25 mg capsule Take 2 capsules by mouth Every Night. For sleep   Yes Stanford Hernandez MD   montelukast (SINGULAIR) 10 MG tablet Take 1 tablet by mouth Every Night. 8/29/19  Yes Stanford Hernandez MD   omeprazole (priLOSEC) 40 MG capsule Take 1 capsule by mouth Daily. 8/31/19  Yes Stanford Hernandez MD   oxybutynin XL (DITROPAN-XL) 5 MG 24 hr tablet  1/11/24  Yes Stanford Hernandez MD   Polyvinyl Alcohol-Povidone PF (HYPOTEARS) 1.4-0.6 % ophthalmic solution Administer 1-2 drops to both eyes As Needed.   Yes Stanford Hernandez MD   propranolol LA (INDERAL LA) 120 MG 24 hr capsule Take 1 capsule by mouth Every Morning. 10/24/24  Yes Branden Hernandez MD   RESTASIS 0.05 % ophthalmic emulsion Administer 1 drop to both eyes Every 12 (Twelve) Hours. 8/27/19  Yes Stanford Hernandez MD   apixaban (ELIQUIS) 5 MG tablet tablet Take 1 tablet by mouth Every 12 (Twelve) Hours. Indications: Atrial Fibrillation 8/7/24   Josephine Perez APRN   diphenhydrAMINE (BENADRYL) 25 mg capsule Take 1 capsule by mouth Every 6 (Six) Hours As Needed for Itching.    Stanford Hernandez MD   SUMAtriptan (IMITREX) 50 MG tablet Take 1 tablet by mouth As Needed for Migraine. 6/26/22   Stanford Hernandez MD     Allergies   Allergen Reactions   • Contrast Dye (Echo Or Unknown Ct/Mr) Unknown (See Comments)   • Hydrocodone Unknown (See Comments)   • Oxycodone Unknown (See Comments)   • Sulfamethoxazole-Trimethoprim Hives   • Cyclobenzaprine Other (See Comments)     Off balance   • Loratadine Other (See Comments)   • Tramadol Hcl Unknown (See Comments)     Most Recent Immunizations   Administered Date(s) Administered   • Tdap 09/07/2021  "    Social History   History  Tobacco Use  •  Smoking status:  Former      Passive exposure:  Past  •  Smokeless tobacco:  Former  Substance Use Topics  •  Alcohol use:  Yes      Alcohol/week:  7.0 standard drinks of alcohol      Types:  7 Glasses of wine per week      Comment: wine with dinner       Social History   History  Substance and Sexual Activity  Drug Use  Never        REVIEW OF SYSTEMS:  Head: negative for headache  Respiratory: negative for shortness of breath.   Cardiovascular: negative for chest pain.   Gastrointestinal: negative abdominal pain.   Neurological: negative for LOC  Psychiatric/Behavioral: negative for memory loss.   All other systems reviewed and are negative    VITALS: /60 (BP Location: Left arm, Patient Position: Lying)   Pulse 65   Temp 97.9 °F (36.6 °C) (Oral)   Resp 16   Ht 162.6 cm (64\")   Wt 74.8 kg (164 lb 12.8 oz)   SpO2 96%   BMI 28.29 kg/m²  Body mass index is 28.29 kg/m².    PHYSICAL EXAM:   CONSTITUTIONAL: A&Ox3, No acute distress  LUNGS: Equal chest rise, no shortness of air  CARDIOVASCULAR: palpable peripheral pulses  SKIN: no skin lesions in the area examined  LYMPH: no lymphadenopathy in the area examined  EXTREMITY: Knee  Pulses:  Brisk Capillary Refill  Sensation: Intact to Saphenous, Sural, Deep Peroneal, Superficial Peroneal, and Tibial Nerves and grossly throughout extremity  Motor: 5/5 EHL/FHL/TA/GS motor complexes    RADIOLOGY REVIEW:   XR Chest PA & Lateral    Result Date: 11/26/2024  Impression: 1.No active disease. 2.Old granulomatous disease. Electronically Signed: Daniel Cárdenas MD  11/26/2024 1:25 PM EST  Workstation ID: [id number]      LABS:   Results for the past 24 hours: No results found for this or any previous visit (from the past 24 hours).    IMPRESSION:  Patient is a 79 y.o. Not  or  female with end-stage arthritis of the right knee    PLAN:   Surgery: Elective total knee arthroplasty  Consent: The risks and benefits of " operative versus nonoperative treatment were discussed. The patient elected to undergo operative treatment of their knee arthritis. The risks discussed included but were not limited to blood clots, MI, stroke, other medical complications, infection, damage to neurovascular structures, continued pain, hardware prominence, loss of range of motion, need for further procedures, and and risk of anesthesia..  No guarantees were made   Disposition: Elective right Total Knee Arthroplasty today.    Elias Sewell II, MD  Orthopaedic Surgery  [facility]

## 2024-11-30 LAB
ANION GAP SERPL CALCULATED.3IONS-SCNC: 6.7 MMOL/L (ref 5–15)
BUN SERPL-MCNC: 18 MG/DL (ref 8–23)
BUN/CREAT SERPL: 22.5 (ref 7–25)
CALCIUM SPEC-SCNC: 8.3 MG/DL (ref 8.6–10.5)
CHLORIDE SERPL-SCNC: 102 MMOL/L (ref 98–107)
CO2 SERPL-SCNC: 28.3 MMOL/L (ref 22–29)
CREAT SERPL-MCNC: 0.8 MG/DL (ref 0.57–1)
DEPRECATED RDW RBC AUTO: 47.2 FL (ref 37–54)
EGFRCR SERPLBLD CKD-EPI 2021: 74.1 ML/MIN/1.73
ERYTHROCYTE [DISTWIDTH] IN BLOOD BY AUTOMATED COUNT: 13.2 % (ref 12.3–15.4)
GLUCOSE SERPL-MCNC: 129 MG/DL (ref 65–99)
HCT VFR BLD AUTO: 27.1 % (ref 34–46.6)
HGB BLD-MCNC: 8.5 G/DL (ref 12–15.9)
MCH RBC QN AUTO: 30.4 PG (ref 26.6–33)
MCHC RBC AUTO-ENTMCNC: 31.4 G/DL (ref 31.5–35.7)
MCV RBC AUTO: 96.8 FL (ref 79–97)
PLATELET # BLD AUTO: 151 10*3/MM3 (ref 140–450)
PMV BLD AUTO: 9.4 FL (ref 6–12)
POTASSIUM SERPL-SCNC: 4.1 MMOL/L (ref 3.5–5.2)
RBC # BLD AUTO: 2.8 10*6/MM3 (ref 3.77–5.28)
SODIUM SERPL-SCNC: 137 MMOL/L (ref 136–145)
WBC NRBC COR # BLD AUTO: 7.02 10*3/MM3 (ref 3.4–10.8)

## 2024-11-30 PROCEDURE — 97110 THERAPEUTIC EXERCISES: CPT

## 2024-11-30 PROCEDURE — A9270 NON-COVERED ITEM OR SERVICE: HCPCS | Performed by: ORTHOPAEDIC SURGERY

## 2024-11-30 PROCEDURE — 63710000001 ACETAMINOPHEN 500 MG TABLET: Performed by: ORTHOPAEDIC SURGERY

## 2024-11-30 PROCEDURE — 25010000002 CEFAZOLIN PER 500 MG: Performed by: ORTHOPAEDIC SURGERY

## 2024-11-30 PROCEDURE — 25810000003 SODIUM CHLORIDE 0.9 % SOLUTION: Performed by: ORTHOPAEDIC SURGERY

## 2024-11-30 PROCEDURE — 63710000001 OXYCODONE 5 MG TABLET: Performed by: ORTHOPAEDIC SURGERY

## 2024-11-30 PROCEDURE — 80048 BASIC METABOLIC PNL TOTAL CA: CPT | Performed by: ORTHOPAEDIC SURGERY

## 2024-11-30 PROCEDURE — 63710000001 APIXABAN 5 MG TABLET: Performed by: ORTHOPAEDIC SURGERY

## 2024-11-30 PROCEDURE — 63710000001 MELOXICAM 15 MG TABLET: Performed by: ORTHOPAEDIC SURGERY

## 2024-11-30 PROCEDURE — 97162 PT EVAL MOD COMPLEX 30 MIN: CPT

## 2024-11-30 PROCEDURE — 63710000001 DOCUSATE SODIUM 100 MG CAPSULE: Performed by: ORTHOPAEDIC SURGERY

## 2024-11-30 PROCEDURE — 63710000001 PROPRANOLOL LA 60 MG CAPSULE SUSTAINED-RELEASE 24 HR: Performed by: ORTHOPAEDIC SURGERY

## 2024-11-30 PROCEDURE — 85027 COMPLETE CBC AUTOMATED: CPT | Performed by: ORTHOPAEDIC SURGERY

## 2024-11-30 RX ORDER — ONDANSETRON 4 MG/1
4 TABLET, FILM COATED ORAL EVERY 6 HOURS PRN
Qty: 30 TABLET | Refills: 0 | Status: SHIPPED | OUTPATIENT
Start: 2024-11-30

## 2024-11-30 RX ORDER — OXYCODONE AND ACETAMINOPHEN 5; 325 MG/1; MG/1
1 TABLET ORAL EVERY 4 HOURS PRN
Qty: 50 TABLET | Refills: 0 | Status: SHIPPED | OUTPATIENT
Start: 2024-11-30

## 2024-11-30 RX ADMIN — APIXABAN 5 MG: 5 TABLET, FILM COATED ORAL at 21:05

## 2024-11-30 RX ADMIN — OXYCODONE 5 MG: 5 TABLET ORAL at 16:14

## 2024-11-30 RX ADMIN — DOCUSATE SODIUM 100 MG: 100 CAPSULE, LIQUID FILLED ORAL at 08:32

## 2024-11-30 RX ADMIN — ACETAMINOPHEN 1000 MG: 500 TABLET, FILM COATED ORAL at 16:06

## 2024-11-30 RX ADMIN — ACETAMINOPHEN 1000 MG: 500 TABLET, FILM COATED ORAL at 04:54

## 2024-11-30 RX ADMIN — SODIUM CHLORIDE 1000 ML: 9 INJECTION, SOLUTION INTRAVENOUS at 10:30

## 2024-11-30 RX ADMIN — SODIUM CHLORIDE 2000 MG: 900 INJECTION INTRAVENOUS at 01:00

## 2024-11-30 RX ADMIN — APIXABAN 5 MG: 5 TABLET, FILM COATED ORAL at 08:32

## 2024-11-30 RX ADMIN — OXYCODONE 5 MG: 5 TABLET ORAL at 08:34

## 2024-11-30 RX ADMIN — PROPRANOLOL HYDROCHLORIDE 120 MG: 60 CAPSULE, EXTENDED RELEASE ORAL at 08:32

## 2024-11-30 RX ADMIN — MELOXICAM 15 MG: 15 TABLET ORAL at 08:32

## 2024-11-30 RX ADMIN — ACETAMINOPHEN 1000 MG: 500 TABLET, FILM COATED ORAL at 10:12

## 2024-11-30 NOTE — PLAN OF CARE
Goal Outcome Evaluation:      Pt alert and oriented. Pt complaints of pain managed with IM medication. Pt up to bedside commode with 1-2 person assist. Pt receiving IV antibiotics. BP slightly low, Dr. Sewell notified, if pt symptomatic can give bolus, otherwise continue to monitor. Pt asymptomatic at this time. Plan of care ongoing.

## 2024-11-30 NOTE — DISCHARGE SUMMARY
Orthopaedic Discharge Summary  Dr. PATTERSON “Zev” [place] II  [phone]    NAME: Joie COOPER Gretchen PCP: Pedro Rivera MD   :  MRN: [id number]  [id number] LOS:  ADMIT: 0 days  2024   AGE/SEX: 79 y.o. female DC:  today             Admitting Diagnosis: Unilateral primary osteoarthritis, right knee [M17.11]  Knee joint replacement status [Z96.659]    Surgery Performed: [place] ARTHRP KNE CONDYLE&PLATU MEDIAL&LAT COMPARTMENTS [18996] (TOTAL KNEE ARTHROPLASTY WITH CORI ROBOT)    Discharge Medications:         Discharge Medications        New Medications        Instructions Start Date   ondansetron 4 MG tablet  Commonly known as: Zofran   4 mg, Oral, Every 6 Hours PRN      oxyCODONE-acetaminophen 5-325 MG per tablet  Commonly known as: PERCOCET   1 tablet, Oral, Every 4 Hours PRN             Continue These Medications        Instructions Start Date   acetaminophen 500 MG tablet  Commonly known as: TYLENOL   1,000 mg, Nightly      apixaban 5 MG tablet tablet  Commonly known as: ELIQUIS   5 mg, Oral, Every 12 Hours Scheduled      Azelastine HCl 137 MCG/SPRAY solution   Administer 2 sprays into the nostril(s) as directed by provider 2 (Two) Times a Day.      calcium carbonate 600 MG tablet  Commonly known as: OS-RAVEN   600 mg, 2 Times Daily With Meals      cholecalciferol 25 MCG (1000 UT) tablet  Commonly known as: VITAMIN D3   1,000 Units, Daily      citalopram 10 MG tablet  Commonly known as: CeleXA   1 tablet, Every Evening      diphenhydrAMINE 25 mg capsule  Commonly known as: BENADRYL   25 mg, Every 6 Hours PRN      diphenhydrAMINE 25 mg capsule  Commonly known as: BENADRYL   50 mg, Nightly      montelukast 10 MG tablet  Commonly known as: SINGULAIR   10 mg, Nightly      omeprazole 40 MG capsule  Commonly known as: priLOSEC   40 mg, Daily      oxybutynin XL 5 MG 24 hr tablet  Commonly known as: DITROPAN-XL       Polyvinyl Alcohol-Povidone PF 1.4-0.6 % ophthalmic solution  Commonly known as: ARTIFICIAL TEARS   1-2 drops,  As Needed      propranolol  MG 24 hr capsule  Commonly known as: INDERAL LA   120 mg, Oral, Every Morning      Restasis 0.05 % ophthalmic emulsion  Generic drug: cycloSPORINE   1 drop, Every 12 Hours      SUMAtriptan 50 MG tablet  Commonly known as: IMITREX   1 tablet, As Needed               Vitals:     Vitals:    11/29/24 2041 11/30/24 0049 11/30/24 0444 11/30/24 0735   BP: 120/52 104/43 101/53 121/50   BP Location: Right arm Right arm Right arm Right arm   Patient Position: Lying Lying Lying Lying   Pulse: 73 75 71 71   Resp: 20 14 18 14   Temp: 98.1 °F (36.7 °C) 98.3 °F (36.8 °C) 98.5 °F (36.9 °C) 98.2 °F (36.8 °C)   TempSrc: Oral Oral Oral Oral   SpO2: 92% 95% 95%    Weight:       Height:           Labs:      Admission on 11/29/2024   Component Date Value Ref Range Status    WBC 11/26/2024 6.04  3.40 - 10.80 10*3/mm3 Final    RBC 11/26/2024 3.74 (L)  3.77 - 5.28 10*6/mm3 Final    Hemoglobin 11/26/2024 11.2 (L)  12.0 - 15.9 g/dL Final    Hematocrit 11/26/2024 34.5  34.0 - 46.6 % Final    MCV 11/26/2024 92.2  79.0 - 97.0 fL Final    MCH 11/26/2024 29.9  26.6 - 33.0 pg Final    MCHC 11/26/2024 32.5  31.5 - 35.7 g/dL Final    RDW 11/26/2024 12.1 (L)  12.3 - 15.4 % Final    RDW-SD 11/26/2024 40.6  37.0 - 54.0 fl Final    MPV 11/26/2024 9.7  6.0 - 12.0 fL Final    Platelets 11/26/2024 226  140 - 450 10*3/mm3 Final    Neutrophil % 11/26/2024 59.1  42.7 - 76.0 % Final    Lymphocyte % 11/26/2024 26.0  19.6 - 45.3 % Final    Monocyte % 11/26/2024 8.9  5.0 - 12.0 % Final    Eosinophil % 11/26/2024 5.0  0.3 - 6.2 % Final    Basophil % 11/26/2024 0.7  0.0 - 1.5 % Final    Immature Grans % 11/26/2024 0.3  0.0 - 0.5 % Final    Neutrophils, Absolute 11/26/2024 3.57  1.70 - 7.00 10*3/mm3 Final    Lymphocytes, Absolute 11/26/2024 1.57  0.70 - 3.10 10*3/mm3 Final    Monocytes, Absolute 11/26/2024 0.54  0.10 - 0.90 10*3/mm3 Final    Eosinophils, Absolute 11/26/2024 0.30  0.00 - 0.40 10*3/mm3 Final    Basophils, Absolute  11/26/2024 0.04  0.00 - 0.20 10*3/mm3 Final    Immature Grans, Absolute 11/26/2024 0.02  0.00 - 0.05 10*3/mm3 Final    nRBC 11/26/2024 0.0  0.0 - 0.2 /100 WBC Final    Glucose 11/30/2024 129 (H)  65 - 99 mg/dL Final    BUN 11/30/2024 18  8 - 23 mg/dL Final    Creatinine 11/30/2024 0.80  0.57 - 1.00 mg/dL Final    Sodium 11/30/2024 137  136 - 145 mmol/L Final    Potassium 11/30/2024 4.1  3.5 - 5.2 mmol/L Final    Chloride 11/30/2024 102  98 - 107 mmol/L Final    CO2 11/30/2024 28.3  22.0 - 29.0 mmol/L Final    Calcium 11/30/2024 8.3 (L)  8.6 - 10.5 mg/dL Final    BUN/Creatinine Ratio 11/30/2024 22.5  7.0 - 25.0 Final    Anion Gap 11/30/2024 6.7  5.0 - 15.0 mmol/L Final    eGFR 11/30/2024 74.1  >60.0 mL/min/1.73 Final    WBC 11/30/2024 7.02  3.40 - 10.80 10*3/mm3 Final    RBC 11/30/2024 2.80 (L)  3.77 - 5.28 10*6/mm3 Final    Hemoglobin 11/30/2024 8.5 (L)  12.0 - 15.9 g/dL Final    Hematocrit 11/30/2024 27.1 (L)  34.0 - 46.6 % Final    MCV 11/30/2024 96.8  79.0 - 97.0 fL Final    MCH 11/30/2024 30.4  26.6 - 33.0 pg Final    MCHC 11/30/2024 31.4 (L)  31.5 - 35.7 g/dL Final    RDW 11/30/2024 13.2  12.3 - 15.4 % Final    RDW-SD 11/30/2024 47.2  37.0 - 54.0 fl Final    MPV 11/30/2024 9.4  6.0 - 12.0 fL Final    Platelets 11/30/2024 151  140 - 450 10*3/mm3 Final        No results found.    Hospital Course:   79 y.o. female was admitted to [facility] to services of Elias Sewell II, MD with Unilateral primary osteoarthritis, right knee [M17.11]  Knee joint replacement status [Z96.659] on 11/29/2024 and underwent OR ARTHRP KNE CONDYLE&PLATU MEDIAL&LAT COMPARTMENTS [34733] (TOTAL KNEE ARTHROPLASTY WITH CORI ROBOT). Post-operatively the patient transferred to the floor where the patient underwent mobilization therapy. Opioids were titrated to achieve appropriate pain management to allow for participation in mobilization exercises. Vital signs and laboratory values are now within safe parameters for discharge.  "The dressings and/or incision is intact without signs or symptoms of active infection. Operative extremity neurovascular status remains intact as compared to the preoperative exam. Appropriate education re: incision care, activity levels, medications, and follow up visits was completed and all questions were answered. The patient is now deemed stable for discharge.  Her discharge was held up 1 additional day due to hypotension    HOME: The patient progressed well with physical therapy. There were cleared for discharge to home. The patietn was sent home in good condition}.       R \"Zev\" Donato DURAN MD  Orthopaedic Surgery  [facility]  [phone]                                               "

## 2024-11-30 NOTE — DISCHARGE INSTRUCTIONS
Total Knee  Discharge Instructions  Dr. LEONARDO Schulz” Donato II  [phone]    INCISION CARE  Wash your hands prior to dressing changes  LISA Wound VAC: Postoperatively you had a LISA Wound Vac placed on the incision. This was placed under sterile conditions in the operating room. It remains in place for 7 days postoperatively. After 7 days, the entire dressing must be removed, including all of the sticky adhesive. The dressing and battery pack provide gentle suction to the incision and provide several benefits over a traditional dressing:  It maintains the sterile environment of the OR and reduces the risk of infection  The suction removes unwanted buildup of blood/hematoma under the skin to reduce swelling  The suction also promotes fresh blood supply to the skin and soft tissue to speed up healing  The postoperative scar is reduced in size  Showering is permitted immediately after surgery, but the battery pack must be protected or removed during the shower.   After 7 days the LISA Wound Vac is removed. If there is no drainage, no dressing is required. If there is some scant drainage a dry bandage can be applied and changed daily until seen in the office or until the drainage stops.   No creams or ointments to the incisions until 4 weeks post op.  Do not touch or pick at the incision  Check incision every day and notify surgeon immediately if any of the following signs or symptoms are seen:  Increase in redness  Increase in swelling around the incision and of the entire extremity  Increase in pain  NEW drainage or oozing from the incision  Pulling apart of the edges of the incision  Increase in overall body temperature (greater than 100.4 degrees)  Zip-Line: your incision was closed with a state of the art device.   Is a non-invasive and easy to use wound closure device that replaces sutures, staples and glue for surgical incisions  It minimizes scarring and eliminating “railroad” marks that come with staples or  sutures  It makes removal as atraumatic as peeling off a bandage  Can be removed at home or by a physical therapist or nurse at 14 days postoperatively    ACTIVITIES  Exercises:  Physical therapy will begin immediately while in the hospital. Patients going to a nursing home will get therapy as part of their care at the SNU/SNF facility. Patients going home may also have a therapist come to the house to help them mobilize until they can safely get to an outpatient therapy facility.  Elevate the affected leg most of the day during the first week post operatively. Caution must be taken to avoid pillow placement directly under the heel of the leg, as this can cause pressure ulcers even with a soft pillow. All pillows and blankets should be placed underneath of the thigh and calf so that the heel is free-floating.  Use cold packs for 20-30 minutes approximately 5 times per day.  You should perform the daily stretching and strengthening exercises as taught by the therapist as often as possible. This can be done many times a day.  Full weight bearing is allowed after surgery. It will be sore/painful to put weight on the leg, but this will help the bone to heal and prevent complications such as pneumonia, bed sores and blood clots. Mobilization is vital to the recovery process.  Activities of Daily Living:  No tub baths, hot tubs, or swimming pools for 4 weeks.  May shower and let water run over the incision immediately after surgery. The battery pack of the LISA Wound Vac must be protected or removed while in the shower. After the LISA is removed 7 days after surgery showering is permitted as long as there is no drainage from the wound.     Restrictions  Weight: It is ok to allow full weight bearing after surgery. Weight on the leg actually quickens the recovery process. While it will be sore/painful to put weight on the leg, it is safe to do so. Hip replacement after hip fracture has a much slower recover process. It can  take months to heal fully from a hip fracture and patients even make some slow benefits up to a year afterwards.   Driving: Many patients have questions about when it is safe to return to driving. The answer is that this is extremely variable. It depends on the extent of the surgery, as well as how quickly you heal. Certainly left leg surgeries make returning to driving easier while right leg surgeries require more extensive rehabilitation before driving can be safe. Until you can press down on the brake hard, and are off of all narcotics, driving is not permitted. Your surgeon cannot “clear” you to return to driving, only you can make the decision when you feel it is safe.    Medications  Anticoagulants: After upper extremity surgery most patients do not require an anticoagulant unless you have another injury that will be keeping you from mobilizing. Lower extremity surgery typically does require use of an anticoagulation medicine.   IF YOU HAD LOWER EXTREMITY SURGERY AND ARE NOT DISCHARGED HOME WITH ANY ANTICOAGULANT MEDICINE YOU SHOULD TAKE ASPIRIN 325mg DAILY FOR 30 DAYS POSTOPERATIVELY.  If you are discharged home with an anticoagulant such as Aspirin, Xarelto, Eliquis, Coumadin, or Lovenox, follow these simple instructions:   Notify surgeon immediately if any radha bleeding is noted in the urine, stool, emesis, or from the nose or the incision. Blood in the stool will often appear as black rather than red. Blood in urine may appear as pink. Blood in emesis may appear as brown/black like coffee grounds.  You will need to apply pressure for longer periods of time to any cuts or abrasions to stop bleeding  Avoid alcohol while taking anticoagulants  Most anticoagulants are to be taken for 30 days postoperatively. After this time, you may stop using them unless instructed otherwise.   If you were already taking an anticoagulant (commonly Aspirin, Coumadin, or Plavix) you will likely be resuming your normal dose  postoperatively and will be continuing that medication at the discretion of the prescribing physician.  Stool Softeners: You will be at greater risk of constipation after surgery due to being less mobile and the pain medications.  Take stool softeners as needed. Over the counter Colace 100 mg 1-2 capsules twice daily can be taken.  If stools become too loose or too frequent, please decreases the dosage or stop the stool softener.  If constipation occurs despite use of stool softeners, you are to continue the stool softeners and add a laxative (Milk of Magnesia 1 ounce daily as needed)  Drink plenty of fluids, and eat fruits and vegetables during your recovery time. Getting up and mobilizing will help the bowels to recover their regular function, as will weaning off of all narcotics when the pain becomes tolerable.  Pain Medications: Utilized after surgery are narcotics. This is some general information about these medications.  CLASSIFICATION: Pain medications are called Opioids and are narcotics  LEGALITIES: It is illegal to share narcotics with others  DRIVING: it is illegal to drive while under the influence of narcotics. Doing so is a DUI.  POTENTIAL SIDE EFFECTS: nausea, vomiting, itching, dizziness, drowsiness, dry mouth, constipation, and difficulty urinating.  POTENTIAL ADVERSE EFFECTS:  Opioid tolerance can develop with use of pain medications and this simply means that it requires more and more of the medication to control pain. However, this is seen more in patients that use opioids for longer periods of time.  Opioid dependence can develop with use of Opioids. People with opioid dependence will experience withdrawal symptoms upon cessation of the medication.  Opioid addiction can develop with use of Opioids. The incidence of this is very unlikely in patients who take the medications as ordered and stop the medications as instructed.  Opioid overdose can be dangerous, but is unlikely when the medication  is taken as ordered and stopped when ordered. It is important not to mix opioids with alcohol as this can lead to over sedation and respiratory difficulty.  DOSAGE:  After the initial surgical pain begins to resolve, you may begin to decrease the pain medication. By the end of a few weeks, you should be off of pain medications.  Refills will not be given by the office during evening hours, on weekends, or after 6 weeks post-op. You are responsible for weaning off of pain medication. You can increase the time between narcotic pills, taking one every 4 then 6 then 8 hours and so on.  To seek refills on pain medications during the initial 6-week post-operative period, you must call the office to request the refill. The office will then notify you when to  the prescription. DO NOT wait until you are out of the medication to request a refill. Prescriptions will not be filled over the weekend and depending on the schedule, it may take a couple days for the prescription to be available. Someone will have to pick the prescription in person at the office.    FOLLOW-UP VISITS  You will need to follow up in the office with your surgeon in 3 weeks, or as instructed elsewhere in your discharge paperwork. Please call this number [phone] to schedule this appointment. If you are going to an SNF/SNU facility, they will arrange for you to follow up in the office.  If you have any concerns or suspected complications prior to your follow up visit, please call the office. Do not wait until your appointment time if you suspect complications. These will need to be addressed in the office promptly.      Elias Sewell II, MD  Orthopaedic Surgery  [facility]

## 2024-11-30 NOTE — THERAPY TREATMENT NOTE
"Subjective: Pt agreeable to therapeutic plan of care. RN stated pt hadn't been in bed too long and  had just got bolus fluids due to low BP even while supine. Pt agreed to bed exs with RLE    Objective:     Precautions - R TKR, WBAT, orthostatic BP    Bed mobility - N/A or Not attempted.  Transfers - N/A or Not attempted.  Ambulation -  feet N/A or Not attempted.    Therapeutic Exercise - 8 R Lower Extremity AAROM lying supine    Vitals: WNL BP supine:127/50    Pain: 7 VAS   Location: R knee but got meds not too long ago  Intervention for pain: Repositioned and Therapeutic Presence    Education: Provided education on the importance of mobility in the acute care setting, Verbal/Tactile Cues, and Post-Op Precautions    Assessment: Joie Godinez presents with functional mobility impairments which indicate the need for skilled intervention. Tolerating session today without incident. Pt still limited by orthostatic BP and pain. Plans ar ehome with  and HH at dc when stable.Will continue to follow and progress as tolerated.     Plan/Recommendations:   If medically appropriate, Low Intensity Therapy recommended post-acute care - This is recommended as therapy feels this patient would require 2-3 visits per week. OP or HH would be the best option depending on patient's home bound status. Consider, if the patient has other  \"skilled\" needs such as wounds, IV antibiotics, etc. Combined with \"low intensity\" could also equate to a SNF. If patient is medically complex, consider LTAC. Pt requires no DME at discharge.     Pt desires Home with family assist and Home Health at discharge. Pt cooperative; agreeable to therapeutic recommendations and plan of care.     Post-Tx Position: Supine with HOB Elevated and Call light and personal items within reach  PPE: gloves    Therapy Charges for Today       Code Description Service Date Service Provider Modifiers Qty    57965129718 HC PT THER PROC EA 15 MIN 11/30/2024 Jon, " Consuelo COOK PTA GP 1           PT Charges       Row Name 11/30/24 1656 11/30/24 1431          Time Calculation    Start Time 1628  - 0944  -     Stop Time 1640  - 1006  -     Time Calculation (min) 12 min  - 22 min  -     PT Received On 11/30/24  - 11/30/24  -     PT - Next Appointment 12/01/24  - 12/01/24  -     PT Goal Re-Cert Due Date -- 12/14/24  -        Time Calculation- PT    Total Timed Code Minutes- PT 12 minute(s)  - 0 minute(s)  -               User Key  (r) = Recorded By, (t) = Taken By, (c) = Cosigned By      Initials Name Provider Type    Winnie Shelby, PT Physical Therapist    Consuelo Maciel, PTA Physical Therapist Assistant

## 2024-11-30 NOTE — THERAPY EVALUATION
Patient Name: Joie Godinez  : 1945    MRN: [id number]                              Today's Date: 2024       Admit Date: 2024    Visit Dx: No diagnosis found.  Patient Active Problem List   Diagnosis    Chest pain    Dizziness    Shortness of breath    Sinus bradycardia    Status post placement of implantable loop recorder    Encounter for preprocedural cardiovascular examination    Tachycardia-bradycardia    Palpitations    Essential hypertension    Chest discomfort    Incomplete right bundle branch block    Abnormal nuclear stress test    Tachycardia    Palpitation    Atrial flutter, paroxysmal    Unilateral primary osteoarthritis, right knee    Knee joint replacement status     Past Medical History:   Diagnosis Date    Acid reflux     Afib     Anemia     Blind right eye     Bradycardia     History of loop recorder     approximately 2017 or thereabouts    Hyperlipidemia     Macular degeneration     Mobility poor     cane/walker PRN    Palpitations      Past Surgical History:   Procedure Laterality Date    CARDIAC CATHETERIZATION      CARDIAC CATHETERIZATION N/A 2022    Procedure: Left and Right Heart Cath with Coronary Angiography;  Surgeon: Otis Patel MD;  Location: [facility] CATH INVASIVE LOCATION;  Service: Cardiovascular;  Laterality: N/A;    CARDIAC ELECTROPHYSIOLOGY PROCEDURE Right 2024    Procedure: Ablation atrial flutter;  Surgeon: Branden Hernandez MD;  Location: [facility] CATH INVASIVE LOCATION;  Service: Cardiovascular;  Laterality: Right;    FOOT SURGERY      GALLBLADDER SURGERY      HYSTERECTOMY      TOTAL HIP ARTHROPLASTY Bilateral       General Information       Row Name 24 1423          Physical Therapy Time and Intention    Document Type evaluation  -     Mode of Treatment physical therapy  -       Row Name 24 1423          General Information    Patient Profile Reviewed yes  -JH     Prior Level of Function independent:;ADL's;all household  mobility  -     Existing Precautions/Restrictions orthostatic hypotension  -     Barriers to Rehab none identified  -       Row Name 11/30/24 1423          Living Environment    People in Home spouse  -[facility] Name 11/30/24 1423          Home Main Entrance    Number of Stairs, Main Entrance one  -[facility] Name 11/30/24 1423          Stairs Within Home, Primary    Stairs, Within Home, Primary optional basement, usually goes down there to watch TV  -[facility] Name 11/30/24 1423          Cognition    Orientation Status (Cognition) oriented x 4  -       Row Name 11/30/24 1423          Safety Issues/Impairments Affecting Functional Mobility    Impairments Affecting Function (Mobility) pain;strength;range of motion (ROM);endurance/activity tolerance  -               User Key  (r) = Recorded By, (t) = Taken By, (c) = Cosigned By      Initials Name Provider Type    Winnie Shelby, NOAH Physical Therapist                   Mobility       Row Name 11/30/24 1424          Bed Mobility    Comment, (Bed Mobility) pt up in chair on arrival, recently up with nsg  -[facility] Name 11/30/24 1424          Sit-Stand Transfer    Sit-Stand [place] (Transfers) unable to assess  -     Comment, (Sit-Stand Transfer) deferred due to hypotension  -       Row Name 11/30/24 1424 11/30/24 1006       Gait/Stairs (Locomotion)    [place] Level (Gait) unable to assess  - --    Patient was able to Ambulate -- no, other medical factors prevent ambulation  -    Reason Patient was unable to Ambulate -- Hypotension  -[facility] Name 11/30/24 1424          Mobility    Extremity Weight-bearing Status right lower extremity  -     Right Lower Extremity (Weight-bearing Status) weight-bearing as tolerated (WBAT)  -               User Key  (r) = Recorded By, (t) = Taken By, (c) = Cosigned By      Initials Name Provider Type    Winnie Shelby PT Physical Therapist                   Obj/Interventions       Row Name  11/30/24 1424          Range of Motion Comprehensive    Comment, General Range of Motion BUE and LLE AROM WFLs.  RLE AROM knee 5-90 degrees  -[facility] Name 11/30/24 1424          Strength Comprehensive (MMT)    Comment, General Manual Muscle Testing (MMT) Assessment RLE hip 3/5, knee 3/5, ankle 5/5.  LLE 5/5  -[facility] Name 11/30/24 1424          Motor Skills    Therapeutic Exercise --  issued written HEP and performed AP, QS, assisted heel slides, ab/add, seated knee flexion, LAQs x 10 reps each  -[facility] Name 11/30/24 1424          Sensory Assessment (Somatosensory)    Sensory Assessment (Somatosensory) sensation intact  -               User Key  (r) = Recorded By, (t) = Taken By, (c) = Cosigned By      Initials Name Provider Type     Winnie Alcocer, PT Physical Therapist                   Goals/Plan       [facility] Name 11/30/24 1429          Bed Mobility Goal 1 (PT)    Activity/Assistive Device (Bed Mobility Goal 1, PT) bed mobility activities, all  -     [place] Level/Cues Needed (Bed Mobility Goal 1, PT) independent  -     Time Frame (Bed Mobility Goal 1, PT) 3 days  -[facility] Name 11/30/24 1429          Transfer Goal 1 (PT)    Activity/Assistive Device (Transfer Goal 1, PT) sit-to-stand/stand-to-sit;bed-to-chair/chair-to-bed;walker, rolling  -     [place] Level/Cues Needed (Transfer Goal 1, PT) modified independence  -     Time Frame (Transfer Goal 1, PT) 3 days  -[facility] Name 11/30/24 1429          Gait Training Goal 1 (PT)    Activity/Assistive Device (Gait Training Goal 1, PT) gait (walking locomotion);assistive device use;walker, rolling  -     [place] Level (Gait Training Goal 1, PT) modified independence  -     Distance (Gait Training Goal 1, PT) 100'  -     Time Frame (Gait Training Goal 1, PT) 3 days  -[facility] Name 11/30/24 1429          Stairs Goal 1 (PT)    Activity/Assistive Device (Stairs Goal 1, PT) stairs, all skills  -     [place]  Level/Cues Needed (Stairs Goal 1, PT) contact guard required  -     Number of Stairs (Stairs Goal 1, PT) 1  -     Time Frame (Stairs Goal 1, PT) 3 days  -       Row Name 11/30/24 1427          Patient Education Goal (PT)    Activity (Patient Education Goal, PT) HEP  -     [place]/Cues/Accuracy (Memory Goal 2, PT) demonstrates adequately;verbalizes understanding  -     Time Frame (Patient Education Goal, PT) 3 days  -       Row Name 11/30/24 1424          Therapy Assessment/Plan (PT)    Planned Therapy Interventions (PT) bed mobility training;gait training;transfer training;strengthening;ROM (range of motion);stair training;patient/family education;home exercise program  -               User Key  (r) = Recorded By, (t) = Taken By, (c) = Cosigned By      Initials Name Provider Type     Winnie Alcocer, PT Physical Therapist                   Clinical Impression       Row Name 11/30/24 1426          Pain    Pretreatment Pain Rating 4/10  -     Posttreatment Pain Rating 4/10  -     Pain Location knee  -     Pain Side/Orientation right  -       Row Name 11/30/24 1426          Plan of Care Review    Plan of Care Reviewed With patient  -     Outcome Evaluation 78 yo female s/p R TKR 11/29.  Pt is from home with her spouse and normally independent.  This date, pt sitting up in recliner, just got up with nsg.  Initiated post op exercises and reclined and seated position, pt tolerated well.  Had pt ready to stand up and she c/o dizziness, pale in color.  Reclined pt back in chair and checked BP which was 90/36.  RN called to bedside, BP recheck after a few minutes was 97/33.  Unable to attempt standing or walking, MD notified pt not recommended to d/c home today.  Will follow pt BID and progress mobility as tolerated, plans home with spouse and HHPT at d/c.  Pt has a cane and RW at home.  -       Row Name 11/30/24 1423          Therapy Assessment/Plan (PT)    Rehab Potential (PT) good  -      Criteria for Skilled Interventions Met (PT) yes;skilled treatment is necessary  -     Therapy Frequency (PT) 2 times/day  -       Row Name 11/30/24 1426          Vital Signs    Intra Systolic BP Rehab 90  -     Intra Treatment Diastolic BP 36  -JH     Post Systolic BP Rehab 97  -     Post Treatment Diastolic BP 33  -JH     Intratreatment Heart Rate (beats/min) 71  -JH     O2 Delivery Pre Treatment room air  -     O2 Delivery Intra Treatment room air  -     Post SpO2 (%) 96  -     O2 Delivery Post Treatment room air  -       Row Name 11/30/24 1426          Positioning and Restraints    Pre-Treatment Position sitting in chair/recliner  -     Post Treatment Position chair  -     In Chair reclined;call light within reach;with nsg  -               User Key  (r) = Recorded By, (t) = Taken By, (c) = Cosigned By      Initials Name Provider Type     Winnie Alcocer, NOAH Physical Therapist                   Outcome Measures       Row Name 11/30/24 1430 11/30/24 1100       How much help from another person do you currently need...    Turning from your back to your side while in flat bed without using bedrails? 3  - 2  -HT    Moving from lying on back to sitting on the side of a flat bed without bedrails? 3  - 2  -HT    Moving to and from a bed to a chair (including a wheelchair)? 3  - 2  -HT    Standing up from a chair using your arms (e.g., wheelchair, bedside chair)? 3  - 2  -HT    Climbing 3-5 steps with a railing? 2  - 1  -HT    To walk in hospital room? 2  - 2  -HT    AM-[facility] 6 Clicks Score (PT) 16  - 11  -    Highest Level of Mobility Goal 5 --> Static standing  - 4 --> Transfer to chair/commode  -      Row Name 11/30/24 1430          Functional Assessment    Outcome Measure Options AM-PAC 6 Clicks Basic Mobility (PT)  -               User Key  (r) = Recorded By, (t) = Taken By, (c) = Cosigned By      Initials Name Provider Type    Winnei Shelby, NOAH Physical Therapist      Lindsay Riggs, RN Registered Nurse                                 Physical Therapy Education       Title: PT OT SLP Therapies (Done)       Topic: Physical Therapy (Done)       Point: Mobility training (Done)       Learning Progress Summary            Patient Acceptance, E,TB, VU by  at 11/30/2024 1430                      Point: Home exercise program (Done)       Learning Progress Summary            Patient Acceptance, E,TB, VU by  at 11/30/2024 1430                      Point: Precautions (Done)       Learning Progress Summary            Patient Acceptance, E,TB, VU by  at 11/30/2024 1430                                      User Key       Initials Effective Dates Name Provider Type Discipline     06/16/21 -  Winnie Alcocer, PT Physical Therapist PT                  PT Recommendation and Plan  Planned Therapy Interventions (PT): bed mobility training, gait training, transfer training, strengthening, ROM (range of motion), stair training, patient/family education, home exercise program  Outcome Evaluation: 78 yo female s/p R TKR 11/29.  Pt is from home with her spouse and normally independent.  This date, pt sitting up in recliner, just got up with nsg.  Initiated post op exercises and reclined and seated position, pt tolerated well.  Had pt ready to stand up and she c/o dizziness, pale in color.  Reclined pt back in chair and checked BP which was 90/36.  RN called to bedside, BP recheck after a few minutes was 97/33.  Unable to attempt standing or walking, MD notified pt not recommended to d/c home today.  Will follow pt BID and progress mobility as tolerated, plans home with spouse and HHPT at d/c.  Pt has a cane and RW at home.     Time Calculation:         PT Charges       Row Name 11/30/24 1431             Time Calculation    Start Time 0944  -      Stop Time 1006  -      Time Calculation (min) 22 min  -      PT Received On 11/30/24  -      PT - Next Appointment 12/01/24  -      PT  Goal Re-Cert Due Date 12/14/24  -         Time Calculation- PT    Total Timed Code Minutes- PT 0 minute(s)  -                User Key  (r) = Recorded By, (t) = Taken By, (c) = Cosigned By      Initials Name Provider Type    Winnie Shelby, PT Physical Therapist                  Therapy Charges for Today       Code Description Service Date Service Provider Modifiers Qty    96592798865 HC PT EVAL MOD COMPLEXITY 4 11/30/2024 Winnie Alcocer, NOAH GP 1            PT G-Codes  Outcome Measure Options: AM-PAC 6 Clicks Basic Mobility (PT)  AM-PAC 6 Clicks Score (PT): 16  PT Discharge Summary  Anticipated Discharge Disposition (PT): home with home health, home with assist    Winnie Alcocer PT  11/30/2024

## 2024-11-30 NOTE — PLAN OF CARE
Goal Outcome Evaluation:    Patient is alert and able to make needs known. Patient sat up in chair a lot today. Patient did have symptomatic hypotension this morning, 1 L NS bolus given.

## 2024-11-30 NOTE — PLAN OF CARE
Assessment: Joie Godinez presents with functional mobility impairments which indicate the need for skilled intervention. Tolerating session today without incident. Pt still limited by orthostatic BP and pain. Plans ar ehome with  and HH at [place] when stable.Will continue to follow and progress as tolerated.

## 2024-11-30 NOTE — DISCHARGE PLACEMENT REQUEST
"Raul Godinez (79 y.o. Female)       Date of Birth   1945    Social Security Number       Address   [address] [place] [address] [place] [facility]    Home Phone   [phone]    MRN   [id number]       Zoroastrianism   Orthodoxy    Marital Status                               Admission Date   11/29/24    Admission Type   Elective    Admitting Provider   Elias Sewell II, MD    Attending Provider   Elias Sewell II, MD    Department, Room/Bed   [facility] SURGICAL INPATIENT, 4109/1       Discharge Date       Discharge Disposition       Discharge Destination                                 Attending Provider: Elias Sewell II, MD    Allergies: Contrast Dye (Echo Or Unknown Ct/mr), Hydrocodone, Oxycodone, Sulfamethoxazole-trimethoprim, Cyclobenzaprine, Loratadine, Tramadol Hcl    Isolation: None   Infection: None   Code Status: CPR    Ht: 162.6 cm (64\")   Wt: 74.8 kg (164 lb 12.8 oz)    Admission Cmt: None   Principal Problem: Unilateral primary osteoarthritis, right knee [M17.11]                   Active Insurance as of 11/29/2024       Primary Coverage       Payor Plan Insurance Group Employer/Plan Group    [facility] MEDICARE REPLACEMENT [facility] MED ADV GROUP [id number]       Payor Plan Address Payor Plan Phone Number Payor Plan Fax Number Effective Dates    [address] [address]   1/1/2023 - None Entered    [facility] [id number]         Subscriber Name Subscriber Birth Date Member ID       RAUL GODINEZ 1945 [id number]                     Emergency Contacts        (Rel.) Home Phone Work Phone Mobile Phone    Kaleb Godinez (Spouse) [phone] -- [phone]                 History & Physical        Elias Sewell II, MD at 11/29/24 0936            Orthopaedic Surgery  History & Physical For Elective Total Knee  Dr. PATTERSON “Zev” Donato DURAN  [phone]    HPI:  Patient is a 79 y.o. Not  or  female who presents with " End-stage arthritis of the right knee. They failed conservative treatment of their knee pain and a thorough discussion of the risks and benefits of surgery was had. The patient wishes to continue with elective total knee replacement, they were scheduled and are here for surgery. They did get medical clearance as well as a thorough preoperative workup.    MEDICAL HISTORY  Past Medical History:   Diagnosis Date    Acid reflux     Afib     Anemia     Blind right eye     Bradycardia     History of loop recorder     approximately 2017 or thereabouts    Hyperlipidemia     Macular degeneration     Mobility poor     cane/walker PRN    Palpitations      Past Surgical History:   Procedure Laterality Date    CARDIAC CATHETERIZATION      CARDIAC CATHETERIZATION N/A 09/02/2022    Procedure: Left and Right Heart Cath with Coronary Angiography;  Surgeon: Otis Patel MD;  Location: [facility] CATH INVASIVE LOCATION;  Service: Cardiovascular;  Laterality: N/A;    CARDIAC ELECTROPHYSIOLOGY PROCEDURE Right 1/24/2024    Procedure: Ablation atrial flutter;  Surgeon: Branden Hernandez MD;  Location: [facility] CATH INVASIVE LOCATION;  Service: Cardiovascular;  Laterality: Right;    FOOT SURGERY      GALLBLADDER SURGERY      HYSTERECTOMY      TOTAL HIP ARTHROPLASTY Bilateral      Prior to Admission medications    Medication Sig Start Date End Date Taking? Authorizing Provider   acetaminophen (TYLENOL) 500 MG tablet Take 2 tablets by mouth Every Night. For sleep   Yes Stanford Hernandez MD   Azelastine HCl 137 MCG/SPRAY solution Administer 2 sprays into the nostril(s) as directed by provider 2 (Two) Times a Day. 12/26/23  Yes Stanford Hernandez MD   calcium carbonate (OS-RAVEN) 600 MG tablet Take 1 tablet by mouth 2 (Two) Times a Day With Meals.   Yes Stanford Hernandez MD   cholecalciferol (VITAMIN D3) 1000 units tablet Take 1 tablet by mouth Daily.   Yes Stanford Hernandez MD   citalopram (CeleXA) 10 MG tablet Take 1 tablet by  mouth Every Evening. 4/10/20  Yes Stanford Hernandez MD   diphenhydrAMINE (BENADRYL) 25 mg capsule Take 2 capsules by mouth Every Night. For sleep   Yes Stanford Hernandez MD   montelukast (SINGULAIR) 10 MG tablet Take 1 tablet by mouth Every Night. 8/29/19  Yes Stanford Hernandez MD   omeprazole (priLOSEC) 40 MG capsule Take 1 capsule by mouth Daily. 8/31/19  Yes Stanford Hernandez MD   oxybutynin XL (DITROPAN-XL) 5 MG 24 hr tablet  1/11/24  Yes Stanford Hernandez MD   Polyvinyl Alcohol-Povidone PF (HYPOTEARS) 1.4-0.6 % ophthalmic solution Administer 1-2 drops to both eyes As Needed.   Yes Stanford Hernandez MD   propranolol LA (INDERAL LA) 120 MG 24 hr capsule Take 1 capsule by mouth Every Morning. 10/24/24  Yes Branden Hernandez MD   RESTASIS 0.05 % ophthalmic emulsion Administer 1 drop to both eyes Every 12 (Twelve) Hours. 8/27/19  Yes Stanford Hernandez MD   apixaban (ELIQUIS) 5 MG tablet tablet Take 1 tablet by mouth Every 12 (Twelve) Hours. Indications: Atrial Fibrillation 8/7/24   Josephine Perez APRN   diphenhydrAMINE (BENADRYL) 25 mg capsule Take 1 capsule by mouth Every 6 (Six) Hours As Needed for Itching.    Stanford Hernandez MD   SUMAtriptan (IMITREX) 50 MG tablet Take 1 tablet by mouth As Needed for Migraine. 6/26/22   Stanford Hernandez MD     Allergies   Allergen Reactions    Contrast Dye (Echo Or Unknown Ct/Mr) Unknown (See Comments)    Hydrocodone Unknown (See Comments)    Oxycodone Unknown (See Comments)    Sulfamethoxazole-Trimethoprim Hives    Cyclobenzaprine Other (See Comments)     Off balance    Loratadine Other (See Comments)    Tramadol Hcl Unknown (See Comments)     Most Recent Immunizations   Administered Date(s) Administered    Tdap 09/07/2021     Social History   History  Tobacco Use    Smoking status:  Former      Passive exposure:  Past    Smokeless tobacco:  Former  Substance Use Topics    Alcohol use:  Yes      Alcohol/week:  7.0 standard drinks  "of alcohol      Types:  7 Glasses of wine per week      Comment: wine with dinner       Social History   History  Substance and Sexual Activity  Drug Use  Never        REVIEW OF SYSTEMS:  Head: negative for headache  Respiratory: negative for shortness of breath.   Cardiovascular: negative for chest pain.   Gastrointestinal: negative abdominal pain.   Neurological: negative for LOC  Psychiatric/Behavioral: negative for memory loss.   All other systems reviewed and are negative    VITALS: /60 (BP Location: Left arm, Patient Position: Lying)   Pulse 65   Temp 97.9 °F (36.6 °C) (Oral)   Resp 16   Ht 162.6 cm (64\")   Wt 74.8 kg (164 lb 12.8 oz)   SpO2 96%   BMI 28.29 kg/m²  Body mass index is 28.29 kg/m².    PHYSICAL EXAM:   CONSTITUTIONAL: A&Ox3, No acute distress  LUNGS: Equal chest rise, no shortness of air  CARDIOVASCULAR: palpable peripheral pulses  SKIN: no skin lesions in the area examined  LYMPH: no lymphadenopathy in the area examined  EXTREMITY: Knee  Pulses:  Brisk Capillary Refill  Sensation: Intact to Saphenous, Sural, Deep Peroneal, Superficial Peroneal, and Tibial Nerves and grossly throughout extremity  Motor: 5/5 EHL/FHL/TA/GS motor complexes    RADIOLOGY REVIEW:   XR Chest PA & Lateral    Result Date: 11/26/2024  Impression: 1.No active disease. 2.Old granulomatous disease. Electronically Signed: Daniel Cárdenas MD  11/26/2024 1:25 PM EST  Workstation ID: [id number]      LABS:   Results for the past 24 hours: No results found for this or any previous visit (from the past 24 hours).    IMPRESSION:  Patient is a 79 y.o. Not  or  female with end-stage arthritis of the right knee    PLAN:   Surgery: Elective total knee arthroplasty  Consent: The risks and benefits of operative versus nonoperative treatment were discussed. The patient elected to undergo operative treatment of their knee arthritis. The risks discussed included but were not limited to blood clots, MI, stroke, other " medical complications, infection, damage to neurovascular structures, continued pain, hardware prominence, loss of range of motion, need for further procedures, and and risk of anesthesia..  No guarantees were made   Disposition: Elective right Total Knee Arthroplasty today.    Elias Sewell II, MD  Orthopaedic Surgery  [facility]    Electronically signed by Elias Sewell II, MD at 11/29/24 7936       H&P signed by New On[employer], [employer] at 11/27/24 9367         [Media Unavailable] Scan on 11/26/2024 1534 by [employer] On[employer], [employer]: RIGHT KNEE PAIN/IMAGING/LAB RESULTS, [facility], 11/26/2024          Electronically signed by New On[employer], [employer] at 11/27/24 4610

## 2024-11-30 NOTE — CASE MANAGEMENT/SOCIAL WORK
Discharge Planning Assessment   [place]     Patient Name: Joie Godinez  MRN: [id number]  Today's Date: 11/30/2024    Admit Date: 11/29/2024    Plan: Home with [facility]- accepted.  12/2 first visit- pending they obtain order from Dr. Sewell's office.   Discharge Needs Assessment       Row Name 11/30/24 1435       Living Environment    People in Home spouse    Current Living Arrangements home    Potentially Unsafe Housing Conditions none    In the past 12 months has the electric, gas, oil, or water company threatened to shut off services in your home? No    Primary Care Provided by self    Provides Primary Care For no one    Family Caregiver if Needed spouse    Quality of Family Relationships helpful;involved;supportive    Able to Return to Prior Arrangements yes       Resource/Environmental Concerns    Resource/Environmental Concerns none    Transportation Concerns none       Transportation Needs    In the past 12 months, has lack of transportation kept you from medical appointments or from getting medications? no    In the past 12 months, has lack of transportation kept you from meetings, work, or from getting things needed for daily living? No       Food Insecurity    Within the past 12 months, you worried that your food would run out before you got the money to buy more. Never true    Within the past 12 months, the food you bought just didn't last and you didn't have money to get more. Never true       Transition Planning    Patient/Family Anticipates Transition to home with family    Patient/Family Anticipated Services at Transition none    Transportation Anticipated family or friend will provide       Discharge Needs Assessment    Readmission Within the Last 30 Days no previous admission in last 30 days    Equipment Currently Used at Home commode;walker, rolling;cane, quad    Concerns to be Addressed denies needs/concerns at this time    Anticipated Changes Related to Illness none    Equipment Needed After  Discharge none    Provided Post Acute Provider List? N/A                   Discharge Plan       Row Name 11/30/24 1431       Plan    Plan Home with [facility]- accepted.  12/2 first visit- pending they obtain order from Dr. Hoover's office.    Patient/Family in Agreement with Plan yes    Plan Comments met with patient at bedside.  lives at home with spouse.  has rw, can and bsc.  I with adls.  pcp and pharm verified.  reported that pt was accepted by [facility] with 1st visti on 12/2 and arranged through dr. hoover's office.  CM requested order from Dr. Hoover who stated his office had arranged.  Cat with [facility] central intake inquired about order and that they couldn't see until order received.  CM requested her to explain to Dr. Hoover directly as this CM had already requsted order once.                  Continued Care and Services - Admitted Since 11/29/2024       Home Medical Care Coordination complete.      Service Provider Request Status Services Address Phone Fax Patient Preferred     [facility]  Selected Home Health Services [address] [facility] [place] [address] [phone] --       Internal Comment last updated by Tamika Olvera, RN 11/30/2024 1431    Services set up by dr. Hoover's office.  Central intake said that they cannot start services until order received by Dr. Hoover's office.                             Expected Discharge Date and Time       Expected Discharge Date Expected Discharge Time    Dec 1, 2024            Demographic Summary       Row Name 11/30/24 1434       General Information    Admission Type other (see comments)  outpatient    Arrived From PACU/recovery room    Required Notices Provided Observation Status Notice    Referral Source admission list    Reason for Consult discharge planning    Preferred Language English                   Functional Status       Row Name 11/30/24 1434       Functional Status    Usual Activity Tolerance good    Current Activity Tolerance moderate        Functional Status, IADL    Medications independent    Meal Preparation independent    Housekeeping independent    Laundry independent    Shopping independent    If for any reason you need help with day-to-day activities such as bathing, preparing meals, shopping, managing finances, etc., do you get the help you need? I get all the help I need       Mental Status    General Appearance WDL WDL       Mental Status Summary    Recent Changes in Mental Status/Cognitive Functioning no changes                   Psychosocial    No documentation.                  Abuse/Neglect    No documentation.                  Legal    No documentation.                  Substance Abuse    No documentation.                  Patient Forms    No documentation.                     Tamika Olvera RN

## 2024-11-30 NOTE — PLAN OF CARE
Goal Outcome Evaluation:  Plan of Care Reviewed With: patient           Outcome Evaluation: 80 yo female s/p R TKR 11/29.  Pt is from home with her spouse and normally independent.  This date, pt sitting up in recliner, just got up with nsg.  Initiated post op exercises and reclined and seated position, pt tolerated well.  Had pt ready to stand up and she c/o dizziness, pale in color.  Reclined pt back in chair and checked BP which was 90/36.  RN called to bedside, BP recheck after a few minutes was 97/33.  Unable to attempt standing or walking, MD notified pt not recommended to d/c home today.  Will follow pt BID and progress mobility as tolerated, plans home with spouse and HHPT at d/c.  Pt has a cane and RW at home.    Anticipated Discharge Disposition (PT): home with home health, home with assist

## 2024-12-01 VITALS
HEIGHT: 64 IN | WEIGHT: 164.8 LBS | BODY MASS INDEX: 28.13 KG/M2 | RESPIRATION RATE: 22 BRPM | DIASTOLIC BLOOD PRESSURE: 67 MMHG | SYSTOLIC BLOOD PRESSURE: 146 MMHG | TEMPERATURE: 98.3 F | OXYGEN SATURATION: 95 % | HEART RATE: 82 BPM

## 2024-12-01 PROCEDURE — A9270 NON-COVERED ITEM OR SERVICE: HCPCS | Performed by: ORTHOPAEDIC SURGERY

## 2024-12-01 PROCEDURE — 63710000001 APIXABAN 5 MG TABLET: Performed by: ORTHOPAEDIC SURGERY

## 2024-12-01 PROCEDURE — 25010000002 ONDANSETRON PER 1 MG: Performed by: ORTHOPAEDIC SURGERY

## 2024-12-01 PROCEDURE — 63710000001 OXYCODONE 5 MG TABLET: Performed by: ORTHOPAEDIC SURGERY

## 2024-12-01 PROCEDURE — 97110 THERAPEUTIC EXERCISES: CPT

## 2024-12-01 PROCEDURE — 63710000001 MELOXICAM 15 MG TABLET: Performed by: ORTHOPAEDIC SURGERY

## 2024-12-01 PROCEDURE — 63710000001 ACETAMINOPHEN 500 MG TABLET: Performed by: ORTHOPAEDIC SURGERY

## 2024-12-01 PROCEDURE — 63710000001 DOCUSATE SODIUM 100 MG CAPSULE: Performed by: ORTHOPAEDIC SURGERY

## 2024-12-01 PROCEDURE — 97116 GAIT TRAINING THERAPY: CPT

## 2024-12-01 RX ADMIN — APIXABAN 5 MG: 5 TABLET, FILM COATED ORAL at 09:43

## 2024-12-01 RX ADMIN — ACETAMINOPHEN 1000 MG: 500 TABLET, FILM COATED ORAL at 04:18

## 2024-12-01 RX ADMIN — MELOXICAM 15 MG: 15 TABLET ORAL at 09:43

## 2024-12-01 RX ADMIN — ONDANSETRON 4 MG: 2 INJECTION, SOLUTION INTRAMUSCULAR; INTRAVENOUS at 01:33

## 2024-12-01 RX ADMIN — OXYCODONE 5 MG: 5 TABLET ORAL at 09:43

## 2024-12-01 RX ADMIN — ACETAMINOPHEN 1000 MG: 500 TABLET, FILM COATED ORAL at 09:43

## 2024-12-01 RX ADMIN — DOCUSATE SODIUM 100 MG: 100 CAPSULE, LIQUID FILLED ORAL at 09:44

## 2024-12-01 NOTE — PLAN OF CARE
Goal Outcome Evaluation:      Pt alert and oriented. Pt complaints of nausea treated with IV medication. Pt requesting no pain medicine at this time. Pt up to bedside commode with two person assistance. VS stable. Plan of care ongoing.

## 2024-12-01 NOTE — PLAN OF CARE
Assessment: Joie Godinez presents with functional mobility impairments which indicate the need for skilled intervention. Tolerating session today without incident. Pt was able to amb some today about as far as she needed to go to get into house. Slowed bety and vc's to incr weight and time on RLE. ROM seated: 8-80 deg with 30 deg ext lag.Plans on dc home with assist and HH.Will continue to follow and progress as tolerated.

## 2024-12-01 NOTE — PROGRESS NOTES
Discharge held up yesterday due to low blood pressure, okay for discharge today as she is no longer symptomatic.

## 2024-12-01 NOTE — THERAPY TREATMENT NOTE
"Subjective: Pt agreeable to therapeutic plan of care.    Objective:     Precautions - falls, R TKR, WBAT, check BP    Bed mobility - N/A or Not attempted.  Transfers - CGA and with rolling walker  Ambulation - 20 feet CGA and with rolling walker    Therapeutic Exercise - 10 Reps B LE AROM supported sitting / chair    Vitals: WNL    Pain:  R knee some  Intervention for pain: Repositioned, Increased Activity, and Therapeutic Presence, ice pack    Education: Provided education on the importance of mobility in the acute care setting, Verbal/Tactile Cues, Transfer Training, Gait Training, Post-Op Precautions, and HEP    Assessment: Joie Godinez presents with functional mobility impairments which indicate the need for skilled intervention. Tolerating session today without incident. Pt was able to amb some today about as far as she needed to go to get into house. Slowed bety and vc's to incr weight and time on RLE. ROM seated: 8-80 deg with 30 deg ext lag.Plans on dc home with assist and HH.Will continue to follow and progress as tolerated.     Plan/Recommendations:   If medically appropriate, Low Intensity Therapy recommended post-acute care - This is recommended as therapy feels this patient would require 2-3 visits per week. OP or HH would be the best option depending on patient's home bound status. Consider, if the patient has other  \"skilled\" needs such as wounds, IV antibiotics, etc. Combined with \"low intensity\" could also equate to a SNF. If patient is medically complex, consider LTAC. Pt requires no DME at discharge.     Pt desires Home with family assist and Home Health at discharge. Pt cooperative; agreeable to therapeutic recommendations and plan of care.         Basic Mobility 6-click:  Rollin = Total, A lot = 2, A little = 3; 4 = None  Supine>Sit:   1 = Total, A lot = 2, A little = 3; 4 = None   Sit>Stand with arms:  1 = Total, A lot = 2, A little = 3; 4 = None  Bed>Chair:   1 = Total, A lot " = 2, A little = 3; 4 = None  Ambulate in room:  1 = Total, A lot = 2, A little = 3; 4 = None  3-5 Steps with railin = Total, A lot = 2, A little = 3; 4 = None  Score: 16    Post-Tx Position: Up in Chair and Call light and personal items within reach  PPE: gloves    Therapy Charges for Today       Code Description Service Date Service Provider Modifiers Qty    [id number] HC PT THER PROC EA 15 MIN 2024 Consuelo Webb, PTA GP 1    [id number]  GAIT TRAINING EA 15 MIN 2024 Consuelo Webb, PTA GP 1    [id number]  PT THER PROC EA 15 MIN 2024 Consuelo Webb, PTA GP 1           PT Charges       Row Name 24 1710             Time Calculation    Start Time 0821  -      Stop Time 0840  -      Time Calculation (min) 19 min  -      PT Received On 24  -         Time Calculation- PT    Total Timed Code Minutes- PT 19 minute(s)  -                User Key  (r) = Recorded By, (t) = Taken By, (c) = Cosigned By      Initials Name Provider Type    Consuelo Maciel, PTA Physical Therapist Assistant

## 2024-12-02 ENCOUNTER — TRANSCRIBE ORDERS (OUTPATIENT)
Dept: HOME HEALTH SERVICES | Facility: HOME HEALTHCARE | Age: 79
End: 2024-12-02
Payer: MEDICARE

## 2024-12-02 ENCOUNTER — HOME CARE VISIT (OUTPATIENT)
Dept: HOME HEALTH SERVICES | Facility: HOME HEALTHCARE | Age: 79
End: 2024-12-02
Payer: MEDICARE

## 2024-12-02 ENCOUNTER — HOME HEALTH ADMISSION (OUTPATIENT)
Dept: HOME HEALTH SERVICES | Facility: HOME HEALTHCARE | Age: 79
End: 2024-12-02
Payer: MEDICARE

## 2024-12-02 VITALS
RESPIRATION RATE: 16 BRPM | DIASTOLIC BLOOD PRESSURE: 74 MMHG | TEMPERATURE: 98 F | SYSTOLIC BLOOD PRESSURE: 122 MMHG | OXYGEN SATURATION: 97 % | HEART RATE: 73 BPM

## 2024-12-02 DIAGNOSIS — Z96.651 AFTERCARE FOLLOWING RIGHT KNEE JOINT REPLACEMENT SURGERY: Primary | ICD-10-CM

## 2024-12-02 DIAGNOSIS — Z47.1 AFTERCARE FOLLOWING RIGHT KNEE JOINT REPLACEMENT SURGERY: Primary | ICD-10-CM

## 2024-12-02 PROCEDURE — G0151 HHCP-SERV OF PT,EA 15 MIN: HCPCS

## 2024-12-02 NOTE — Clinical Note
"SOC Note: Pt referred for home health sp R TKR on 11/29/24.    Home Health ordered for: PT 3w2    Reason for Hosp/Primary Dx/Co-morbidities: R TKR    Focus of Care: R TKR    Patient's goal(s):\"Get rid of walker.\"    Current Functional status/mobility/DME: Mod assist for transfers, min assist with ambulation with use of wheeled walker.     HB status/Living Arrangements: Lives with spouse in a single story house. Spouse willing and able to assist.     Skin Integrity/wound status: LISA dressing intact and operational.    Code Status: CPR    Fall Risk/Safety concerns: high risk    Educated on Emergency Plan, steps to take prior to going to the ER and when to Call Home Health First:  yes     Medication issues/Concerns: none    Additional Problems/Concerns: none    SDOH Barriers (i.e. caregiver concerns, social isolation, transportation, food insecurity, environment, income etc.)/Need for MSW: none"

## 2024-12-02 NOTE — CASE MANAGEMENT/SOCIAL WORK
Case Management Discharge Note      Final Note: HOME WITH [facility]    Provided Post Acute Provider List?: N/A    Selected Continued Care - Discharged on 12/1/2024 Admission date: 11/29/2024 - Discharge disposition: Home or Self Care          Home Medical Care Coordination complete.      Service Provider Services Address Phone Fax Patient Preferred    [facility] [address] [facility] [place] [address] [phone] --       Internal Comment last updated by Tamika Olvera RN 11/30/2024 1431    Services set up by dr. Sewell's office.  Central intake said that they cannot start services until order received by Dr. Sewell's office.                               Transportation Services  Private: Car    Final Discharge Disposition Code: 06 - home with home health care

## 2024-12-04 ENCOUNTER — HOME CARE VISIT (OUTPATIENT)
Dept: HOME HEALTH SERVICES | Facility: HOME HEALTHCARE | Age: 79
End: 2024-12-04
Payer: MEDICARE

## 2024-12-04 VITALS
HEART RATE: 77 BPM | SYSTOLIC BLOOD PRESSURE: 102 MMHG | TEMPERATURE: 98.5 F | OXYGEN SATURATION: 95 % | RESPIRATION RATE: 16 BRPM | DIASTOLIC BLOOD PRESSURE: 68 MMHG

## 2024-12-04 PROCEDURE — G0151 HHCP-SERV OF PT,EA 15 MIN: HCPCS

## 2024-12-06 ENCOUNTER — HOME CARE VISIT (OUTPATIENT)
Dept: HOME HEALTH SERVICES | Facility: HOME HEALTHCARE | Age: 79
End: 2024-12-06
Payer: MEDICARE

## 2024-12-06 VITALS
HEART RATE: 81 BPM | OXYGEN SATURATION: 98 % | TEMPERATURE: 98.4 F | DIASTOLIC BLOOD PRESSURE: 70 MMHG | RESPIRATION RATE: 20 BRPM | SYSTOLIC BLOOD PRESSURE: 138 MMHG

## 2024-12-06 PROCEDURE — G0151 HHCP-SERV OF PT,EA 15 MIN: HCPCS

## 2024-12-09 ENCOUNTER — HOME CARE VISIT (OUTPATIENT)
Dept: HOME HEALTH SERVICES | Facility: HOME HEALTHCARE | Age: 79
End: 2024-12-09
Payer: MEDICARE

## 2024-12-09 VITALS
DIASTOLIC BLOOD PRESSURE: 70 MMHG | TEMPERATURE: 98.1 F | SYSTOLIC BLOOD PRESSURE: 122 MMHG | RESPIRATION RATE: 18 BRPM | HEART RATE: 74 BPM | OXYGEN SATURATION: 98 %

## 2024-12-09 PROCEDURE — G0151 HHCP-SERV OF PT,EA 15 MIN: HCPCS

## 2024-12-10 ENCOUNTER — HOME CARE VISIT (OUTPATIENT)
Dept: HOME HEALTH SERVICES | Facility: HOME HEALTHCARE | Age: 79
End: 2024-12-10
Payer: MEDICARE

## 2024-12-11 ENCOUNTER — HOME CARE VISIT (OUTPATIENT)
Dept: HOME HEALTH SERVICES | Facility: HOME HEALTHCARE | Age: 79
End: 2024-12-11
Payer: MEDICARE

## 2024-12-11 VITALS
TEMPERATURE: 98.6 F | DIASTOLIC BLOOD PRESSURE: 64 MMHG | RESPIRATION RATE: 18 BRPM | HEART RATE: 76 BPM | OXYGEN SATURATION: 98 % | SYSTOLIC BLOOD PRESSURE: 122 MMHG

## 2024-12-11 PROCEDURE — G0151 HHCP-SERV OF PT,EA 15 MIN: HCPCS

## 2024-12-13 ENCOUNTER — HOSPITAL ENCOUNTER (OUTPATIENT)
Facility: HOSPITAL | Age: 79
Setting detail: OBSERVATION
Discharge: HOME | End: 2024-12-14
Attending: EMERGENCY MEDICINE | Admitting: EMERGENCY MEDICINE
Payer: MEDICARE

## 2024-12-13 ENCOUNTER — APPOINTMENT (OUTPATIENT)
Dept: GENERAL RADIOLOGY | Facility: HOSPITAL | Age: 79
End: 2024-12-13
Payer: MEDICARE

## 2024-12-13 DIAGNOSIS — G89.18 ACUTE POSTOPERATIVE PAIN OF RIGHT KNEE: ICD-10-CM

## 2024-12-13 DIAGNOSIS — M25.561 ACUTE POSTOPERATIVE PAIN OF RIGHT KNEE: ICD-10-CM

## 2024-12-13 DIAGNOSIS — M25.561 POSTOPERATIVE PAIN OF RIGHT KNEE: Primary | ICD-10-CM

## 2024-12-13 DIAGNOSIS — G89.18 POSTOPERATIVE PAIN OF RIGHT KNEE: Primary | ICD-10-CM

## 2024-12-13 PROCEDURE — 73562 X-RAY EXAM OF KNEE 3: CPT

## 2024-12-13 PROCEDURE — 87040 BLOOD CULTURE FOR BACTERIA: CPT | Performed by: EMERGENCY MEDICINE

## 2024-12-13 PROCEDURE — 80053 COMPREHEN METABOLIC PANEL: CPT | Performed by: EMERGENCY MEDICINE

## 2024-12-13 PROCEDURE — 85025 COMPLETE CBC W/AUTO DIFF WBC: CPT | Performed by: EMERGENCY MEDICINE

## 2024-12-13 PROCEDURE — 99285 EMERGENCY DEPT VISIT HI MDM: CPT

## 2024-12-13 PROCEDURE — 36415 COLL VENOUS BLD VENIPUNCTURE: CPT

## 2024-12-13 PROCEDURE — 85652 RBC SED RATE AUTOMATED: CPT | Performed by: EMERGENCY MEDICINE

## 2024-12-13 PROCEDURE — 86140 C-REACTIVE PROTEIN: CPT | Performed by: EMERGENCY MEDICINE

## 2024-12-13 RX ORDER — SODIUM CHLORIDE 0.9 % (FLUSH) 0.9 %
10 SYRINGE (ML) INJECTION AS NEEDED
Status: DISCONTINUED | OUTPATIENT
Start: 2024-12-13 | End: 2024-12-14 | Stop reason: HOSPADM

## 2024-12-14 ENCOUNTER — APPOINTMENT (OUTPATIENT)
Dept: CARDIOLOGY | Facility: HOSPITAL | Age: 79
End: 2024-12-14
Payer: MEDICARE

## 2024-12-14 VITALS
HEART RATE: 86 BPM | SYSTOLIC BLOOD PRESSURE: 151 MMHG | HEIGHT: 65 IN | WEIGHT: 173.28 LBS | TEMPERATURE: 98.4 F | BODY MASS INDEX: 28.87 KG/M2 | OXYGEN SATURATION: 97 % | RESPIRATION RATE: 20 BRPM | DIASTOLIC BLOOD PRESSURE: 53 MMHG

## 2024-12-14 PROBLEM — M25.561 ACUTE POSTOPERATIVE PAIN OF RIGHT KNEE: Status: ACTIVE | Noted: 2024-12-14

## 2024-12-14 PROBLEM — G89.18 ACUTE POSTOPERATIVE PAIN OF RIGHT KNEE: Status: ACTIVE | Noted: 2024-12-14

## 2024-12-14 LAB
ALBUMIN SERPL-MCNC: 3.6 G/DL (ref 3.5–5.2)
ALBUMIN/GLOB SERPL: 1.3 G/DL
ALP SERPL-CCNC: 53 U/L (ref 39–117)
ALT SERPL W P-5'-P-CCNC: 16 U/L (ref 1–33)
ANION GAP SERPL CALCULATED.3IONS-SCNC: 10.9 MMOL/L (ref 5–15)
AST SERPL-CCNC: 22 U/L (ref 1–32)
BASOPHILS # BLD AUTO: 0.07 10*3/MM3 (ref 0–0.2)
BASOPHILS NFR BLD AUTO: 0.6 % (ref 0–1.5)
BH CV LOWER VASCULAR LEFT COMMON FEMORAL AUGMENT: NORMAL
BH CV LOWER VASCULAR LEFT COMMON FEMORAL COMPETENT: NORMAL
BH CV LOWER VASCULAR LEFT COMMON FEMORAL COMPRESS: NORMAL
BH CV LOWER VASCULAR LEFT COMMON FEMORAL PHASIC: NORMAL
BH CV LOWER VASCULAR LEFT COMMON FEMORAL SPONT: NORMAL
BH CV LOWER VASCULAR RIGHT COMMON FEMORAL AUGMENT: NORMAL
BH CV LOWER VASCULAR RIGHT COMMON FEMORAL COMPETENT: NORMAL
BH CV LOWER VASCULAR RIGHT COMMON FEMORAL COMPRESS: NORMAL
BH CV LOWER VASCULAR RIGHT COMMON FEMORAL PHASIC: NORMAL
BH CV LOWER VASCULAR RIGHT COMMON FEMORAL SPONT: NORMAL
BH CV LOWER VASCULAR RIGHT DISTAL FEMORAL COMPRESS: NORMAL
BH CV LOWER VASCULAR RIGHT GASTRONEMIUS COMPRESS: NORMAL
BH CV LOWER VASCULAR RIGHT GREATER SAPH AK COMPRESS: NORMAL
BH CV LOWER VASCULAR RIGHT GREATER SAPH BK COMPRESS: NORMAL
BH CV LOWER VASCULAR RIGHT LESSER SAPH COMPRESS: NORMAL
BH CV LOWER VASCULAR RIGHT MID FEMORAL AUGMENT: NORMAL
BH CV LOWER VASCULAR RIGHT MID FEMORAL COMPETENT: NORMAL
BH CV LOWER VASCULAR RIGHT MID FEMORAL COMPRESS: NORMAL
BH CV LOWER VASCULAR RIGHT MID FEMORAL PHASIC: NORMAL
BH CV LOWER VASCULAR RIGHT MID FEMORAL SPONT: NORMAL
BH CV LOWER VASCULAR RIGHT PERONEAL COMPRESS: NORMAL
BH CV LOWER VASCULAR RIGHT POPLITEAL AUGMENT: NORMAL
BH CV LOWER VASCULAR RIGHT POPLITEAL COMPETENT: NORMAL
BH CV LOWER VASCULAR RIGHT POPLITEAL COMPRESS: NORMAL
BH CV LOWER VASCULAR RIGHT POPLITEAL PHASIC: NORMAL
BH CV LOWER VASCULAR RIGHT POPLITEAL SPONT: NORMAL
BH CV LOWER VASCULAR RIGHT POSTERIOR TIBIAL COMPRESS: NORMAL
BH CV LOWER VASCULAR RIGHT PROXIMAL FEMORAL COMPRESS: NORMAL
BH CV LOWER VASCULAR RIGHT SAPHENOFEMORAL JUNCTION COMPRESS: NORMAL
BILIRUB SERPL-MCNC: 0.8 MG/DL (ref 0–1.2)
BUN SERPL-MCNC: 11 MG/DL (ref 8–23)
BUN/CREAT SERPL: 16.7 (ref 7–25)
CALCIUM SPEC-SCNC: 9 MG/DL (ref 8.6–10.5)
CHLORIDE SERPL-SCNC: 100 MMOL/L (ref 98–107)
CO2 SERPL-SCNC: 25.1 MMOL/L (ref 22–29)
CREAT SERPL-MCNC: 0.66 MG/DL (ref 0.57–1)
CRP SERPL-MCNC: 3.03 MG/DL (ref 0–0.5)
D-LACTATE SERPL-SCNC: 0.8 MMOL/L (ref 0.3–2)
DEPRECATED RDW RBC AUTO: 46.5 FL (ref 37–54)
EGFRCR SERPLBLD CKD-EPI 2021: 88.3 ML/MIN/1.73
EOSINOPHIL # BLD AUTO: 1.07 10*3/MM3 (ref 0–0.4)
EOSINOPHIL NFR BLD AUTO: 9.7 % (ref 0.3–6.2)
ERYTHROCYTE [DISTWIDTH] IN BLOOD BY AUTOMATED COUNT: 13.5 % (ref 12.3–15.4)
ERYTHROCYTE [SEDIMENTATION RATE] IN BLOOD: 29 MM/HR (ref 0–30)
GLOBULIN UR ELPH-MCNC: 2.8 GM/DL
GLUCOSE SERPL-MCNC: 109 MG/DL (ref 65–99)
HCT VFR BLD AUTO: 25.7 % (ref 34–46.6)
HGB BLD-MCNC: 8.1 G/DL (ref 12–15.9)
HOLD SPECIMEN: NORMAL
HOLD SPECIMEN: NORMAL
IMM GRANULOCYTES # BLD AUTO: 0.06 10*3/MM3 (ref 0–0.05)
IMM GRANULOCYTES NFR BLD AUTO: 0.5 % (ref 0–0.5)
LYMPHOCYTES # BLD AUTO: 1.36 10*3/MM3 (ref 0.7–3.1)
LYMPHOCYTES NFR BLD AUTO: 12.3 % (ref 19.6–45.3)
MCH RBC QN AUTO: 29.1 PG (ref 26.6–33)
MCHC RBC AUTO-ENTMCNC: 31.5 G/DL (ref 31.5–35.7)
MCV RBC AUTO: 92.4 FL (ref 79–97)
MONOCYTES # BLD AUTO: 0.88 10*3/MM3 (ref 0.1–0.9)
MONOCYTES NFR BLD AUTO: 8 % (ref 5–12)
NEUTROPHILS NFR BLD AUTO: 68.9 % (ref 42.7–76)
NEUTROPHILS NFR BLD AUTO: 7.58 10*3/MM3 (ref 1.7–7)
NRBC BLD AUTO-RTO: 0 /100 WBC (ref 0–0.2)
PLATELET # BLD AUTO: 411 10*3/MM3 (ref 140–450)
PMV BLD AUTO: 9 FL (ref 6–12)
POTASSIUM SERPL-SCNC: 3.7 MMOL/L (ref 3.5–5.2)
PROT SERPL-MCNC: 6.4 G/DL (ref 6–8.5)
RBC # BLD AUTO: 2.78 10*6/MM3 (ref 3.77–5.28)
SODIUM SERPL-SCNC: 136 MMOL/L (ref 136–145)
WBC NRBC COR # BLD AUTO: 11.02 10*3/MM3 (ref 3.4–10.8)
WHOLE BLOOD HOLD COAG: NORMAL
WHOLE BLOOD HOLD SPECIMEN: NORMAL

## 2024-12-14 PROCEDURE — 97162 PT EVAL MOD COMPLEX 30 MIN: CPT

## 2024-12-14 PROCEDURE — G0378 HOSPITAL OBSERVATION PER HR: HCPCS

## 2024-12-14 PROCEDURE — 25010000002 CEFTRIAXONE PER 250 MG: Performed by: EMERGENCY MEDICINE

## 2024-12-14 PROCEDURE — 25010000002 VANCOMYCIN 1.5-0.9 GM/500ML-% SOLUTION: Performed by: EMERGENCY MEDICINE

## 2024-12-14 PROCEDURE — 96365 THER/PROPH/DIAG IV INF INIT: CPT

## 2024-12-14 PROCEDURE — 83605 ASSAY OF LACTIC ACID: CPT

## 2024-12-14 PROCEDURE — 93971 EXTREMITY STUDY: CPT | Performed by: SURGERY

## 2024-12-14 PROCEDURE — 93971 EXTREMITY STUDY: CPT

## 2024-12-14 PROCEDURE — 96367 TX/PROPH/DG ADDL SEQ IV INF: CPT

## 2024-12-14 PROCEDURE — 25010000002 MORPHINE PER 10 MG: Performed by: EMERGENCY MEDICINE

## 2024-12-14 PROCEDURE — 96375 TX/PRO/DX INJ NEW DRUG ADDON: CPT

## 2024-12-14 RX ORDER — ONDANSETRON 2 MG/ML
4 INJECTION INTRAMUSCULAR; INTRAVENOUS EVERY 6 HOURS PRN
Status: DISCONTINUED | OUTPATIENT
Start: 2024-12-14 | End: 2024-12-14 | Stop reason: HOSPADM

## 2024-12-14 RX ORDER — MORPHINE SULFATE 2 MG/ML
2 INJECTION, SOLUTION INTRAMUSCULAR; INTRAVENOUS EVERY 4 HOURS PRN
Status: DISCONTINUED | OUTPATIENT
Start: 2024-12-14 | End: 2024-12-14 | Stop reason: HOSPADM

## 2024-12-14 RX ORDER — SODIUM CHLORIDE 9 MG/ML
40 INJECTION, SOLUTION INTRAVENOUS AS NEEDED
Status: DISCONTINUED | OUTPATIENT
Start: 2024-12-14 | End: 2024-12-14 | Stop reason: HOSPADM

## 2024-12-14 RX ORDER — PANTOPRAZOLE SODIUM 40 MG/1
40 TABLET, DELAYED RELEASE ORAL
Status: DISCONTINUED | OUTPATIENT
Start: 2024-12-14 | End: 2024-12-14 | Stop reason: HOSPADM

## 2024-12-14 RX ORDER — CYCLOSPORINE 0.5 MG/ML
1 EMULSION OPHTHALMIC EVERY 12 HOURS
Status: DISCONTINUED | OUTPATIENT
Start: 2024-12-14 | End: 2024-12-14 | Stop reason: HOSPADM

## 2024-12-14 RX ORDER — CITALOPRAM HYDROBROMIDE 20 MG/1
10 TABLET ORAL EVERY EVENING
Status: DISCONTINUED | OUTPATIENT
Start: 2024-12-14 | End: 2024-12-14 | Stop reason: HOSPADM

## 2024-12-14 RX ORDER — SODIUM CHLORIDE 0.9 % (FLUSH) 0.9 %
10 SYRINGE (ML) INJECTION AS NEEDED
Status: DISCONTINUED | OUTPATIENT
Start: 2024-12-14 | End: 2024-12-14 | Stop reason: HOSPADM

## 2024-12-14 RX ORDER — OXYCODONE HYDROCHLORIDE 5 MG/1
5 TABLET ORAL EVERY 4 HOURS PRN
Status: DISCONTINUED | OUTPATIENT
Start: 2024-12-14 | End: 2024-12-14 | Stop reason: HOSPADM

## 2024-12-14 RX ORDER — PROPRANOLOL HYDROCHLORIDE 60 MG/1
120 CAPSULE, EXTENDED RELEASE ORAL EVERY MORNING
Status: DISCONTINUED | OUTPATIENT
Start: 2024-12-14 | End: 2024-12-14 | Stop reason: HOSPADM

## 2024-12-14 RX ORDER — VANCOMYCIN/0.9 % SOD CHLORIDE 1.5G/250ML
20 PLASTIC BAG, INJECTION (ML) INTRAVENOUS ONCE
Status: COMPLETED | OUTPATIENT
Start: 2024-12-14 | End: 2024-12-14

## 2024-12-14 RX ORDER — BISACODYL 5 MG/1
5 TABLET, DELAYED RELEASE ORAL DAILY PRN
Status: DISCONTINUED | OUTPATIENT
Start: 2024-12-14 | End: 2024-12-14 | Stop reason: HOSPADM

## 2024-12-14 RX ORDER — AMOXICILLIN 250 MG
2 CAPSULE ORAL 2 TIMES DAILY PRN
Status: DISCONTINUED | OUTPATIENT
Start: 2024-12-14 | End: 2024-12-14 | Stop reason: HOSPADM

## 2024-12-14 RX ORDER — ONDANSETRON 4 MG/1
4 TABLET, ORALLY DISINTEGRATING ORAL EVERY 6 HOURS PRN
Status: DISCONTINUED | OUTPATIENT
Start: 2024-12-14 | End: 2024-12-14 | Stop reason: HOSPADM

## 2024-12-14 RX ORDER — BISACODYL 10 MG
10 SUPPOSITORY, RECTAL RECTAL DAILY PRN
Status: DISCONTINUED | OUTPATIENT
Start: 2024-12-14 | End: 2024-12-14 | Stop reason: HOSPADM

## 2024-12-14 RX ORDER — POLYETHYLENE GLYCOL 3350 17 G/17G
17 POWDER, FOR SOLUTION ORAL DAILY PRN
Status: DISCONTINUED | OUTPATIENT
Start: 2024-12-14 | End: 2024-12-14 | Stop reason: HOSPADM

## 2024-12-14 RX ORDER — MONTELUKAST SODIUM 10 MG/1
10 TABLET ORAL NIGHTLY
Status: DISCONTINUED | OUTPATIENT
Start: 2024-12-14 | End: 2024-12-14 | Stop reason: HOSPADM

## 2024-12-14 RX ORDER — SODIUM CHLORIDE 0.9 % (FLUSH) 0.9 %
10 SYRINGE (ML) INJECTION EVERY 12 HOURS SCHEDULED
Status: DISCONTINUED | OUTPATIENT
Start: 2024-12-14 | End: 2024-12-14 | Stop reason: HOSPADM

## 2024-12-14 RX ORDER — ALUMINA, MAGNESIA, AND SIMETHICONE 2400; 2400; 240 MG/30ML; MG/30ML; MG/30ML
15 SUSPENSION ORAL EVERY 6 HOURS PRN
Status: DISCONTINUED | OUTPATIENT
Start: 2024-12-14 | End: 2024-12-14 | Stop reason: HOSPADM

## 2024-12-14 RX ADMIN — Medication 1500 MG: at 03:56

## 2024-12-14 RX ADMIN — PROPRANOLOL HYDROCHLORIDE 120 MG: 60 CAPSULE, EXTENDED RELEASE ORAL at 11:58

## 2024-12-14 RX ADMIN — PANTOPRAZOLE SODIUM 40 MG: 40 TABLET, DELAYED RELEASE ORAL at 08:51

## 2024-12-14 RX ADMIN — CEFTRIAXONE 1000 MG: 1 INJECTION, POWDER, FOR SOLUTION INTRAMUSCULAR; INTRAVENOUS at 02:03

## 2024-12-14 RX ADMIN — Medication 10 ML: at 08:51

## 2024-12-14 RX ADMIN — APIXABAN 5 MG: 5 TABLET, FILM COATED ORAL at 08:51

## 2024-12-14 RX ADMIN — MORPHINE SULFATE 2 MG: 2 INJECTION, SOLUTION INTRAMUSCULAR; INTRAVENOUS at 03:57

## 2024-12-14 RX ADMIN — CYCLOSPORINE 1 DROP: 0.5 EMULSION OPHTHALMIC at 08:51

## 2024-12-14 NOTE — THERAPY EVALUATION
Patient Name: Joie Godinez  : 1945    MRN: [id number]                              Today's Date: 2024       Admit Date: 2024    Visit Dx:     ICD-10-CM ICD-9-CM   1. Postoperative pain of right knee  G89.18 338.18    M25.561 719.46   2. Acute postoperative pain of right knee  G89.18 719.46    M25.561 338.18     Patient Active Problem List   Diagnosis    Chest pain    Dizziness    Shortness of breath    Sinus bradycardia    Status post placement of implantable loop recorder    Encounter for preprocedural cardiovascular examination    Tachycardia-bradycardia    Palpitations    Essential hypertension    Chest discomfort    Incomplete right bundle branch block    Abnormal nuclear stress test    Tachycardia    Palpitation    Atrial flutter, paroxysmal    Unilateral primary osteoarthritis, right knee    Knee joint replacement status    Acute postoperative pain of right knee     Past Medical History:   Diagnosis Date    Acid reflux     Afib     Anemia     Blind right eye     Bradycardia     History of loop recorder     approximately  or thereabouts    Hyperlipidemia     Macular degeneration     Mobility poor     cane/walker PRN    Palpitations      Past Surgical History:   Procedure Laterality Date    CARDIAC CATHETERIZATION      CARDIAC CATHETERIZATION N/A 2022    Procedure: Left and Right Heart Cath with Coronary Angiography;  Surgeon: Otis Patel MD;  Location: [facility] CATH INVASIVE LOCATION;  Service: Cardiovascular;  Laterality: N/A;    CARDIAC ELECTROPHYSIOLOGY PROCEDURE Right 2024    Procedure: Ablation atrial flutter;  Surgeon: Branden Hernandez MD;  Location: [facility] CATH INVASIVE LOCATION;  Service: Cardiovascular;  Laterality: Right;    FOOT SURGERY      GALLBLADDER SURGERY      HYSTERECTOMY      TOTAL HIP ARTHROPLASTY Bilateral     TOTAL KNEE ARTHROPLASTY Right 2024    Procedure: TOTAL KNEE ARTHROPLASTY WITH CORI ROBOT;  Surgeon: Elias Sewell II, MD;   Location: [facility] MAIN OR;  Service: Robotics - Ortho;  Laterality: Right;      General Information       Row Name 12/14/24 1646          Physical Therapy Time and Intention    Document Type evaluation  -EL     Mode of Treatment individual therapy;physical therapy  -EL       Row Name 12/14/24 1646          General Information    Prior Level of Function independent:;all household mobility;ADL's  -EL       Row Name 12/14/24 1646          Living Environment    People in Home spouse  -EL       Row Name 12/14/24 1646          Cognition    Orientation Status (Cognition) oriented x 4  -EL       Row Name 12/14/24 1646          Safety Issues/Impairments Affecting Functional Mobility    Impairments Affecting Function (Mobility) range of motion (ROM);pain;strength  -EL               User Key  (r) = Recorded By, (t) = Taken By, (c) = Cosigned By      Initials Name Provider Type    Tito Peterson PT Physical Therapist                   Mobility       Row Name 12/14/24 1648          Sit-Stand Transfer    Sit-Stand [place] (Transfers) contact guard  -EL     Assistive Device (Sit-Stand Transfers) walker, front-wheeled  -EL       Row Name 12/14/24 1648          Gait/Stairs (Locomotion)    [place] Level (Gait) contact guard  -EL     Assistive Device (Gait) walker, front-wheeled  -EL     Distance in Feet (Gait) 45  -EL     Right Sided Gait Deviations weight shift ability decreased  -EL     Comment, (Gait/Stairs) Limited flexion in RLE when ambulating  -EL       Row Name 12/14/24 1648          Mobility    Extremity Weight-bearing Status right lower extremity  -EL     Right Lower Extremity (Weight-bearing Status) weight-bearing as tolerated (WBAT)  -EL               User Key  (r) = Recorded By, (t) = Taken By, (c) = Cosigned By      Initials Name Provider Type    Tito Peterson PT Physical Therapist                   Obj/Interventions       Row Name 12/14/24 1652          Range of Motion Comprehensive    General Range of Motion  lower extremity range of motion deficits identified  -EL     Comment, General Range of Motion RLE 0-85, LLE WFL  -EL       Row Name 12/14/24 1652          Strength Comprehensive (MMT)    General Manual Muscle Testing (MMT) Assessment lower extremity strength deficits identified  -EL     Comment, General Manual Muscle Testing (MMT) Assessment RLE 3/5, LLE WFL  -EL       Row Name 12/14/24 1652          Balance    Balance Assessment sitting static balance;standing dynamic balance;standing static balance  -EL     Static Sitting Balance independent  -EL     Static Standing Balance contact guard  -EL     Dynamic Standing Balance contact guard  -EL     Position/Device Used, Standing Balance walker, front-wheeled  -EL       Row Name 12/14/24 1652          Sensory Assessment (Somatosensory)    Sensory Assessment (Somatosensory) sensation intact  -EL               User Key  (r) = Recorded By, (t) = Taken By, (c) = Cosigned By      Initials Name Provider Type    Tito Peterson, PT Physical Therapist                   Goals/Plan    No documentation.                  Clinical Impression       Row Name 12/14/24 1657          Pain    Pretreatment Pain Rating 6/10  -EL     Posttreatment Pain Rating 6/10  -EL     Pain Location extremity  -EL     Pain Side/Orientation right;lower  -EL       Row Name 12/14/24 1657          Plan of Care Review    Plan of Care Reviewed With patient  -EL     Outcome Evaluation Pt is an 80 YO F admitted with increasing pain in RLE 2 weeks post op R TKA. Pt with swelling and decreased ROM. Pt reports not performing exercises or mobilizing like instructed at home due to pain. Pt states she was current with HHPT, but they've finished. Pt is generally independent with all ADLs, ambulation withotu AD and lives with spouse. However is reliant on RWx still at this point. Pt is below independetn baseline and will benefit from continued HHPT at d/c.  -EL       Row Name 12/14/24 1657          Therapy Assessment/Plan  (PT)    Therapy Frequency (PT) evaluation only  -EL       Row Name 12/14/24 1657          Vital Signs    Pre Patient Position Supine  -EL     Intra Patient Position Standing  -EL     Post Patient Position Supine  -EL       Row Name 12/14/24 1657          Positioning and Restraints    Pre-Treatment Position in bed  -EL     Post Treatment Position bed  -EL     In Bed notified nsg;supine;call light within reach;encouraged to call for assist;exit alarm on  -EL               User Key  (r) = Recorded By, (t) = Taken By, (c) = Cosigned By      Initials Name Provider Type    Tito Peterson, PT Physical Therapist                   Outcome Measures       Row Name 12/14/24 1702 12/14/24 0800       How much help from another person do you currently need...    Turning from your back to your side while in flat bed without using bedrails? 3  -EL 3  -JH    Moving from lying on back to sitting on the side of a flat bed without bedrails? 3  -EL 3  -JH    Moving to and from a bed to a chair (including a wheelchair)? 3  -EL 3  -JH    Standing up from a chair using your arms (e.g., wheelchair, bedside chair)? 3  -EL 3  -JH    Climbing 3-5 steps with a railing? 2  -EL 1  -JH    To walk in hospital room? 3  -EL 3  -JH    AM-PAC 6 Clicks Score (PT) 17  -EL 16  -JH    Highest Level of Mobility Goal 5 --> Static standing  -EL 5 --> Static standing  -JH      Row Name 12/14/24 0535          How much help from another person do you currently need...    Turning from your back to your side while in flat bed without using bedrails? 2  -BB     Moving from lying on back to sitting on the side of a flat bed without bedrails? 2  -BB     Moving to and from a bed to a chair (including a wheelchair)? 2  -BB     Standing up from a chair using your arms (e.g., wheelchair, bedside chair)? 2  -BB     Climbing 3-5 steps with a railing? 1  -BB     To walk in hospital room? 2  -BB     AM-PAC 6 Clicks Score (PT) 11  -BB     Highest Level of Mobility Goal 4 -->  Transfer to chair/commode  -NOELLE       Row Name 12/14/24 5049          Functional Assessment    Outcome Measure Options AM-PAC 6 Clicks Basic Mobility (PT)  -EL               User Key  (r) = Recorded By, (t) = Taken By, (c) = Cosigned By      Initials Name Provider Type    Tito Peterson, PT Physical Therapist    Citlalli Joe, RN Registered Nurse    Agnieszka Sierra RN Registered Nurse                                 Physical Therapy Education       Title: PT OT SLP Therapies (Resolved)       Topic: Physical Therapy (Resolved)       Point: Mobility training (Resolved)       Learner Progress:  Not documented in this visit.              Point: Home exercise program (Resolved)       Learner Progress:  Not documented in this visit.              Point: Precautions (Resolved)       Learner Progress:  Not documented in this visit.                                  PT Recommendation and Plan     Outcome Evaluation: Pt is an 78 YO F admitted with increasing pain in RLE 2 weeks post op R TKA. Pt with swelling and decreased ROM. Pt reports not performing exercises or mobilizing like instructed at home due to pain. Pt states she was current with HHPT, but they've finished. Pt is generally independent with all ADLs, ambulation withotu AD and lives with spouse. However is reliant on RWx still at this point. Pt is below independetn baseline and will benefit from continued HHPT at d/c.     Time Calculation:   PT Evaluation Complexity  History, PT Evaluation Complexity: 1-2 personal factors and/or comorbidities  Examination of Body Systems (PT Eval Complexity): total of 3 or more elements  Clinical Presentation (PT Evaluation Complexity): evolving  Clinical Decision Making (PT Evaluation Complexity): moderate complexity  Overall Complexity (PT Evaluation Complexity): moderate complexity     PT Charges       Row Name 12/14/24 2102             Time Calculation    Start Time 0911  -EL      Stop Time 0932  -EL      Time Calculation  (min) 21 min  -EL      PT Received On 12/14/24  -EL                User Key  (r) = Recorded By, (t) = Taken By, (c) = Cosigned By      Initials Name Provider Type    Tito Peterson PT Physical Therapist                  Therapy Charges for Today       Code Description Service Date Service Provider Modifiers Qty    81318013851 HC PT EVAL MOD COMPLEXITY 4 12/14/2024 Tito Whiting PT GP 1            PT G-Codes  Outcome Measure Options: AM-PAC 6 Clicks Basic Mobility (PT)  AM-PAC 6 Clicks Score (PT): 17       Tito Whiting PT  12/14/2024

## 2024-12-14 NOTE — PLAN OF CARE
Goal Outcome Evaluation:  Plan of Care Reviewed With: patient           Outcome Evaluation: Pt is an 80 YO F admitted with increasing pain in RLE 2 weeks post op R TKA. Pt with swelling and decreased ROM. Pt reports not performing exercises or mobilizing like instructed at home due to pain. Pt states she was current with HHPT, but they've finished. Pt is generally independent with all ADLs, ambulation withotu AD and lives with spouse. However is reliant on RWx still at this point. Pt is below independetn baseline and will benefit from continued HHPT at d/c.

## 2024-12-14 NOTE — PLAN OF CARE
Problem: Adult Inpatient Plan of Care  Goal: Plan of Care Review  Outcome: Met  Flowsheets (Taken 12/14/2024 1253)  Progress: improving  Outcome Evaluation: discharged home  Plan of Care Reviewed With: patient  Goal: Patient-Specific Goal (Individualized)  Outcome: Met  Goal: Absence of Hospital-Acquired Illness or Injury  Outcome: Met  Intervention: Identify and Manage Fall Risk  Recent Flowsheet Documentation  Taken 12/14/2024 1200 by Agnieszka Falk RN  Safety Promotion/Fall Prevention: safety round/check completed  Taken 12/14/2024 1000 by Agnieszka Falk RN  Safety Promotion/Fall Prevention: safety round/check completed  Taken 12/14/2024 0800 by Agnieszka Falk RN  Safety Promotion/Fall Prevention: safety round/check completed  Intervention: Prevent Skin Injury  Recent Flowsheet Documentation  Taken 12/14/2024 1200 by Agnieszka Falk RN  Body Position: (oob to wheelchair)   position changed independently   weight shifting  Taken 12/14/2024 0800 by Agnieszka Falk RN  Body Position: position changed independently  Intervention: Prevent Infection  Recent Flowsheet Documentation  Taken 12/14/2024 1200 by Agnieszka Falk RN  Infection Prevention:   single patient room provided   personal protective equipment utilized  Taken 12/14/2024 1000 by Agnieszka Falk RN  Infection Prevention: single patient room provided  Taken 12/14/2024 0800 by Agnieszka Falk RN  Infection Prevention:   rest/sleep promoted   hand hygiene promoted  Goal: Optimal Comfort and Wellbeing  Outcome: Met  Goal: Readiness for Transition of Care  Outcome: Met     Problem: Pain Acute  Goal: Optimal Pain Control and Function  Outcome: Met  Intervention: Prevent or Manage Pain  Recent Flowsheet Documentation  Taken 12/14/2024 0800 by Agnieszka Falk RN  Medication Review/Management: medications reviewed     Problem: Fall Injury Risk  Goal: Absence of Fall and Fall-Related Injury  Outcome: Met  Intervention: Identify and Manage Contributors  Recent  Flowsheet Documentation  Taken 12/14/2024 0800 by Agnieszka Falk RN  Medication Review/Management: medications reviewed  Intervention: Promote Injury-Free Environment  Recent Flowsheet Documentation  Taken 12/14/2024 1200 by Agnieszka Falk RN  Safety Promotion/Fall Prevention: safety round/check completed  Taken 12/14/2024 1000 by Agnieszka Falk RN  Safety Promotion/Fall Prevention: safety round/check completed  Taken 12/14/2024 0800 by Agnieszka Falk RN  Safety Promotion/Fall Prevention: safety round/check completed     Problem: Breathing Pattern Ineffective  Goal: Effective Breathing Pattern  Outcome: Met  Intervention: Promote Improved Breathing Pattern  Recent Flowsheet Documentation  Taken 12/14/2024 0800 by Agnieszka Falk RN  Head of Bed (HOB) Positioning: HOB at 30-45 degrees     Problem: Skin Injury Risk Increased  Goal: Skin Health and Integrity  Outcome: Met  Intervention: Optimize Skin Protection  Recent Flowsheet Documentation  Taken 12/14/2024 0800 by Agnieszka Falk RN  Activity Management: ambulated to bathroom  Head of Bed (HOB) Positioning: HOB at 30-45 degrees     Problem: Comorbidity Management  Goal: Maintenance of Asthma Control  Outcome: Met  Intervention: Maintain Asthma Symptom Control  Recent Flowsheet Documentation  Taken 12/14/2024 0800 by Agnieszka Falk RN  Medication Review/Management: medications reviewed  Goal: Maintenance of Behavioral Health Symptom Control  Outcome: Met  Intervention: Maintain Behavioral Health Symptom Control  Recent Flowsheet Documentation  Taken 12/14/2024 0800 by Agnieszka Falk RN  Medication Review/Management: medications reviewed  Goal: Maintenance of COPD Symptom Control  Outcome: Met  Intervention: Maintain COPD (Chronic Obstructive Pulmonary Disease) Symptom Control  Recent Flowsheet Documentation  Taken 12/14/2024 0800 by Agnieszka Falk RN  Medication Review/Management: medications reviewed  Goal: Blood Glucose Level Within Target Range  Outcome:  Met  Intervention: Monitor and Manage Glycemia  Recent Flowsheet Documentation  Taken 2024 0800 by Agnieszka Falk RN  Medication Review/Management: medications reviewed  Goal: Maintenance of Heart Failure Symptom Control  Outcome: Met  Intervention: Maintain Heart Failure Management  Recent Flowsheet Documentation  Taken 2024 0800 by Agnieszka Falk RN  Medication Review/Management: medications reviewed  Goal: Blood Pressure in Desired Range  Outcome: Met  Intervention: Maintain Blood Pressure Management  Recent Flowsheet Documentation  Taken 2024 0800 by Agnieszka Falk RN  Medication Review/Management: medications reviewed  Goal: Maintenance of Osteoarthritis Symptom Control  Outcome: Met  Intervention: Maintain Osteoarthritis Symptom Control  Recent Flowsheet Documentation  Taken 2024 0800 by Agnieszka Falk RN  Activity Management: ambulated to bathroom  Medication Review/Management: medications reviewed  Goal: Bariatric Home Regimen Maintained  Outcome: Met  Intervention: Maintain and Manage Postbariatric Surgery Care  Recent Flowsheet Documentation  Taken 2024 0800 by Agnieszka Falk RN  Medication Review/Management: medications reviewed  Goal: Maintenance of Seizure Control  Outcome: Met  Intervention: Maintain Seizure Symptom Control  Recent Flowsheet Documentation  Taken 2024 0800 by Agnieszka Falk RN  Medication Review/Management: medications reviewed     Problem:  Fall Injury Risk  Goal: Absence of Fall, Infant Drop and Related Injury  Outcome: Met  Intervention: Identify and Manage Contributors  Recent Flowsheet Documentation  Taken 2024 0800 by Agnieszka Falk RN  Medication Review/Management: medications reviewed  Intervention: Promote Injury-Free Environment  Recent Flowsheet Documentation  Taken 2024 1200 by Agnieszka Falk, RN  Safety Promotion/Fall Prevention: safety round/check completed  Taken 2024 1000 by Agnieszka Falk, RN  Safety  Promotion/Fall Prevention: safety round/check completed  Taken 12/14/2024 0800 by Agnieszka Falk, RN  Safety Promotion/Fall Prevention: safety round/check completed   Goal Outcome Evaluation:  Plan of Care Reviewed With: patient        Progress: improving  Outcome Evaluation: discharged home

## 2024-12-14 NOTE — PLAN OF CARE
Problem: Adult Inpatient Plan of Care  Goal: Plan of Care Review  Outcome: Progressing  Flowsheets (Taken 12/14/2024 2175)  Progress: no change  Plan of Care Reviewed With: patient  Goal: Patient-Specific Goal (Individualized)  Outcome: Progressing  Goal: Absence of Hospital-Acquired Illness or Injury  Outcome: Progressing  Goal: Optimal Comfort and Wellbeing  Outcome: Progressing  Goal: Readiness for Transition of Care  Outcome: Progressing     Problem: Pain Acute  Goal: Optimal Pain Control and Function  Outcome: Progressing     Problem: Fall Injury Risk  Goal: Absence of Fall and Fall-Related Injury  Outcome: Progressing     Problem: Breathing Pattern Ineffective  Goal: Effective Breathing Pattern  Outcome: Progressing   Goal Outcome Evaluation:

## 2024-12-14 NOTE — DISCHARGE SUMMARY
[facility]    Pedro Rivera MD    CHIEF COMPLAINT:     Knee pain    HISTORY OF PRESENT ILLNESS:    hospitals    ED  79-year-old female who is 2 weeks postop from right total knee proximal to this facility on 11/29/2024 for complaints of nontraumatic increase the pain/swelling/redness/drainage for the past 3 days     Past Medical History:   Diagnosis Date    Acid reflux     Afib     Anemia     Blind right eye     Bradycardia     History of loop recorder     approximately 2017 or thereabouts    Hyperlipidemia     Macular degeneration     Mobility poor     cane/walker PRN    Palpitations      Past Surgical History:   Procedure Laterality Date    CARDIAC CATHETERIZATION      CARDIAC CATHETERIZATION N/A 09/02/2022    Procedure: Left and Right Heart Cath with Coronary Angiography;  Surgeon: Otis Patel MD;  Location: [facility] CATH INVASIVE LOCATION;  Service: Cardiovascular;  Laterality: N/A;    CARDIAC ELECTROPHYSIOLOGY PROCEDURE Right 1/24/2024    Procedure: Ablation atrial flutter;  Surgeon: Branden Hernandez MD;  Location: [facility] CATH INVASIVE LOCATION;  Service: Cardiovascular;  Laterality: Right;    FOOT SURGERY      GALLBLADDER SURGERY      HYSTERECTOMY      TOTAL HIP ARTHROPLASTY Bilateral     TOTAL KNEE ARTHROPLASTY Right 11/29/2024    Procedure: TOTAL KNEE ARTHROPLASTY WITH CORI ROBOT;  Surgeon: Elias Sewell II, MD;  Location: [facility] MAIN OR;  Service: Robotics - Ortho;  Laterality: Right;     Family History   Problem Relation Age of Onset    Hypertension Mother     Asthma Mother      Social History     Tobacco Use    Smoking status: Former     Passive exposure: Past    Smokeless tobacco: Former   Vaping Use    Vaping status: Never Used   Substance Use Topics    Alcohol use: Yes     Alcohol/week: 7.0 standard drinks of alcohol     Types: 7 Glasses of wine per week     Comment: wine with dinner    Drug use: Never     Medications Prior to Admission   Medication Sig Dispense  Refill Last Dose/Taking    acetaminophen (TYLENOL) 500 MG tablet Take 2 tablets by mouth Every Night. For sleep  Indications: Fever, Pain   Taking    apixaban (ELIQUIS) 5 MG tablet tablet Take 1 tablet by mouth Every 12 (Twelve) Hours. Indications: Atrial Fibrillation 180 tablet 3 Taking    Azelastine HCl 137 MCG/SPRAY solution Administer 2 sprays into the nostril(s) as directed by provider 2 (Two) Times a Day. Indications: Perennial Allergic Rhinitis   Taking    calcium carbonate (OS-RAVEN) 600 MG tablet Take 1 tablet by mouth 2 (Two) Times a Day With Meals. Indications: Low Amount of Calcium in the Blood   Taking    cholecalciferol (VITAMIN D3) 1000 units tablet Take 1 tablet by mouth Daily. Indications: Vitamin D Deficiency   Taking    citalopram (CeleXA) 10 MG tablet Take 1 tablet by mouth Every Evening. Indications: Major Depressive Disorder   Taking    Cyanocobalamin (VITAMIN B 12 PO) Take 1,000 mcg by mouth Daily. Indications: supplement   Taking    diphenhydrAMINE (BENADRYL) 25 mg capsule Take 1 capsule by mouth Every 6 (Six) Hours As Needed for Itching. Indications: Chronic Hives   Taking As Needed    diphenhydrAMINE (BENADRYL) 25 mg capsule Take 2 capsules by mouth Every Night. For sleep  Indications: Trouble Sleeping   Taking    Fiber-Vitamins-Minerals ([employer] Fiber Gummies) chewable tablet Chew 1 tablet Daily As Needed (constipation). Indications: constipation   Taking As Needed    montelukast (SINGULAIR) 10 MG tablet Take 1 tablet by mouth Every Night. Indications: Hayfever   Taking    omeprazole (priLOSEC) 40 MG capsule Take 1 capsule by mouth Daily. Indications: Heartburn   Taking    ondansetron (Zofran) 4 MG tablet Take 1 tablet by mouth Every 6 (Six) Hours As Needed for Nausea or Vomiting. 30 tablet 0 Taking As Needed    oxybutynin XL (DITROPAN-XL) 5 MG 24 hr tablet 1 tablet by mouth daily   Taking    oxyCODONE-acetaminophen (PERCOCET) 5-325 MG per tablet Take 1 tablet by mouth Every 4 (Four) Hours As  Needed for Severe Pain. 50 tablet 0 Taking As Needed    polyethylene glycol (MiraLax) 17 g packet Take 17 g by mouth Daily As Needed (constipation). Indications: Constipation   Taking As Needed    Polyvinyl Alcohol-Povidone PF (HYPOTEARS) 1.4-0.6 % ophthalmic solution Administer 1-2 drops to both eyes As Needed (dry eyes). 1-2 drops in both eyes as needed every 4 hours  Indications: dry eyes   Taking As Needed    propranolol LA (INDERAL LA) 120 MG 24 hr capsule Take 1 capsule by mouth Every Morning. 90 capsule 3 Taking    RESTASIS 0.05 % ophthalmic emulsion Administer 1 drop to both eyes Every 12 (Twelve) Hours. Indications: Drying and Inflammation of Cornea and Conjunctiva of Eyes   Taking     Allergies:  Contrast dye (echo or unknown ct/mr), Hydrocodone, Oxycodone, Sulfamethoxazole-trimethoprim, Cyclobenzaprine, Loratadine, and Tramadol hcl    Immunization History   Administered Date(s) Administered    Tdap 09/07/2021           REVIEW OF SYSTEMS:    Review of Systems   Constitutional: Negative for fever.   Musculoskeletal:         Right knee pain           Vital Signs  Temp:  [98 °F (36.7 °C)-98.5 °F (36.9 °C)] 98.4 °F (36.9 °C)  Heart Rate:  [77-96] 85  Resp:  [18-20] 20  BP: (138-180)/(56-82) 144/69          Physical Exam:  Physical Exam  Constitutional:       Appearance: Normal appearance.   Cardiovascular:      Rate and Rhythm: Normal rate and regular rhythm.   Pulmonary:      Effort: Pulmonary effort is normal.      Breath sounds: Normal breath sounds.   Musculoskeletal:      Comments: Right knee with healing surgical wound. Some erythema, minimal drainage.   Neurological:      Mental Status: She is alert.     Emotional Behavior:    wnl   Debilities:   None    Results Review:    I reviewed the patient's new clinical results.  Lab Results (most recent)       Procedure Component Value Units Date/Time    Comprehensive Metabolic Panel [[id number]]  (Abnormal) Collected: 12/13/24 8729    Specimen: Blood Updated:  12/14/24 0036     Glucose 109 mg/dL      BUN 11 mg/dL      Creatinine 0.66 mg/dL      Sodium 136 mmol/L      Potassium 3.7 mmol/L      Chloride 100 mmol/L      CO2 25.1 mmol/L      Calcium 9.0 mg/dL      Total Protein 6.4 g/dL      Albumin 3.6 g/dL      ALT (SGPT) 16 U/L      AST (SGOT) 22 U/L      Alkaline Phosphatase 53 U/L      Total Bilirubin 0.8 mg/dL      Globulin 2.8 gm/dL      A/G Ratio 1.3 g/dL      BUN/Creatinine Ratio 16.7     Anion Gap 10.9 mmol/L      eGFR 88.3 mL/min/1.73     Narrative:      GFR Categories in Chronic Kidney Disease (CKD)      GFR Category          GFR (mL/min/1.73)    Interpretation  G1                     90 or greater         Normal or high (1)  G2                      60-89                Mild decrease (1)  G3a                   45-59                Mild to moderate decrease  G3b                   30-44                Moderate to severe decrease  G4                    15-29                Severe decrease  G5                    14 or less           Kidney failure          (1)In the absence of evidence of kidney disease, neither GFR category G1 or G2 fulfill the criteria for CKD.    eGFR calculation 2021 CKD-EPI creatinine equation, which does not include race as a factor    C-reactive Protein [[id number]]  (Abnormal) Collected: 12/13/24 2359    Specimen: Blood Updated: 12/14/24 0036     C-Reactive Protein 3.03 mg/dL     Sedimentation Rate [[id number]]  (Normal) Collected: 12/13/24 2359    Specimen: Blood Updated: 12/14/24 0016     Sed Rate 29 mm/hr     Jackson Draw [[id number]] Collected: 12/13/24 2359    Specimen: Blood Updated: 12/14/24 0016    Narrative:      The following orders were created for panel order Jackson Draw.  Procedure                               Abnormality         Status                     ---------                               -----------         ------                     Green Top (Gel)[[id number]]                                  Final result                Lavender Top[[id number]]                                     Final result               Gold Top - SST[[id number]]                                   Final result               Light Blue Top[[id number]]                                   Final result                 Please view results for these tests on the individual orders.    Green Top (Gel) [[id number]] Collected: 12/13/24 2359    Specimen: Blood Updated: 12/14/24 0016     Extra Tube Hold for add-ons.     Comment: Auto resulted.       Gold Top - SST [[id number]] Collected: 12/13/24 2359    Specimen: Blood Updated: 12/14/24 0016     Extra Tube Hold for add-ons.     Comment: Auto resulted.       Lavender Top [[id number]] Collected: 12/13/24 2359    Specimen: Blood Updated: 12/14/24 0016     Extra Tube hold for add-on     Comment: Auto resulted       Light Blue Top [[id number]] Collected: 12/13/24 2359    Specimen: Blood Updated: 12/14/24 0016     Extra Tube Hold for add-ons.     Comment: Auto resulted       Blood Culture - Blood, Wrist, Left [[id number]] Collected: 12/13/24 2359    Specimen: Blood from Wrist, Left Updated: 12/14/24 0011    CBC & Differential [[id number]]  (Abnormal) Collected: 12/13/24 2359    Specimen: Blood Updated: 12/14/24 0008    Narrative:      The following orders were created for panel order CBC & Differential.  Procedure                               Abnormality         Status                     ---------                               -----------         ------                     CBC Auto Differential[[id number]]        Abnormal            Final result                 Please view results for these tests on the individual orders.    CBC Auto Differential [[id number]]  (Abnormal) Collected: 12/13/24 2359    Specimen: Blood Updated: 12/14/24 0008     WBC 11.02 10*3/mm3      RBC 2.78 10*6/mm3      Hemoglobin 8.1 g/dL      Hematocrit 25.7 %      MCV 92.4 fL      MCH 29.1 pg      MCHC 31.5 g/dL      RDW 13.5 %      RDW-SD 46.5 fl      MPV 9.0 fL       Platelets 411 10*3/mm3      Neutrophil % 68.9 %      Lymphocyte % 12.3 %      Monocyte % 8.0 %      Eosinophil % 9.7 %      Basophil % 0.6 %      Immature Grans % 0.5 %      Neutrophils, Absolute 7.58 10*3/mm3      Lymphocytes, Absolute 1.36 10*3/mm3      Monocytes, Absolute 0.88 10*3/mm3      Eosinophils, Absolute 1.07 10*3/mm3      Basophils, Absolute 0.07 10*3/mm3      Immature Grans, Absolute 0.06 10*3/mm3      nRBC 0.0 /100 WBC     Blood Culture - Blood, Arm, Left [[id number]] Collected: 12/13/24 2359    Specimen: Blood from Arm, Left Updated: 12/14/24 0004    POC Lactate [[id number]]  (Normal) Collected: 12/14/24 0002    Specimen: Blood Updated: 12/14/24 0004     Lactate 0.8 mmol/L      Comment: Serial Number: [id number]:  [id number]               Imaging Results (Most Recent)       Procedure Component Value Units Date/Time    XR Knee 3 View Right [[id number]] Collected: 12/13/24 2347     Updated: 12/13/24 2350    Narrative:      XR KNEE 3 VW RIGHT    Date of Exam: 12/13/2024 11:35 PM EST    Indication: pain, recent surgery    Comparison: Right knee November 29, 2024    Findings:  There are postoperative changes from right total knee arthroplasty. There is a moderate size suprapatellar joint effusion. There is diffuse swelling of the knee. Underlying infection is not excluded. There is no immediate hardware complication. There is   no acute fracture.      Impression:      Impression:  Postoperative changes from right total knee arthroplasty. There is a moderate size suprapatellar joint effusion. There is diffuse swelling of the knee. Underlying infection is not excluded.        Electronically Signed: Isreal Herr MD    12/13/2024 11:48 PM EST    Workstation ID: [id number]          reviewed    ECG/EMG Results (most recent)       None          reviewed    Results for orders placed during the hospital encounter of 02/07/23    Duplex venous lower extremity right CAR    Interpretation Summary    Normal right lower  extremity venous duplex scan.      Results for orders placed during the hospital encounter of 01/24/24    Adult Transesophageal Echo (MARIA ISABEL) W/ Cont if Necessary Per Protocol    Interpretation Summary    Left ventricular systolic function is normal. Estimated left ventricular EF = 60% Left ventricular ejection fraction appears to be 56 - 60%.    Left ventricular wall thickness is consistent with mild to moderate concentric hypertrophy.    Left ventricular diastolic function is consistent with (grade I) impaired relaxation.    The left atrial cavity is mildly dilated.    Estimated right ventricular systolic pressure from tricuspid regurgitation is normal (<35 mmHg).    Procedure indication  Recurrent symptomatic atrial arrhythmias in the form of atrial flutter and possible atrial fibrillation    conscious sedation administered by anesthesia  Timeout before procedure    consent obtained before procedure      Procedure note  after obtaining a valid consent patient was sedated by Anesthesia and a MARIA ISABEL probe was easily placed into esophagus with multiplane imaging with 2D, color and Doppler followed by bubble study with agitated saline without any complications    MARIA SIABEL  Findings    LV systolic function with EF of 60% with mild to moderate LVH.  Mildly dilated LA without any shunt or clot  Mild MR mild TR and no effusion      Plan  Proceed with ablation    Procedure done  Transesophageal echocardiography        Electronically signed by Branden Hernandez MD, 01/24/24, 11:47 AM EST.      Microbiology Results (last 10 days)       ** No results found for the last 240 hours. **            Assessment & Plan     Acute postoperative pain of right knee       Right Knee pain s/p total knee  - pt had surgery 11/29/24  - wbc 11  - lactic .8  - crp 3.03, sed rate 29  - cmp unremarkable  - xray right knee reviewed and showing postoperative changes from right total knee arthroplasty. There is a moderate size suprapatellar joint effusion.  There is diffuse swelling of the knee. Underlying infection is not excluded.   - ortho consulted and reviewed chart. Believes pt is stable to go home and follow up on Monday as outpt in office  - PT consulted and recommends  PT         I discussed the patients findings and my recommendations with patient and nursing staff.     Discharge Diagnosis:      Acute postoperative pain of right knee      Hospital Course  Patient is a 79 y.o. female presented with right knee pain s/p total knee 2 weeks ago. Er evaluated and admitted to observation. Labs shoed very slightly elevated wbc at 11, crp 3.03, sed rate normal at 29. Xray of knee showing joint effusion. Ortho consulted and reviewed chart. Recommends home with follow up in office on Monday. Pt consulted and recommends discharge home with home health PT. Discharge discussed with pt and she is agreeable to plan. Instructed pt to return to er if symptoms reoccur or worsen.      Past Medical History:     Past Medical History:   Diagnosis Date    Acid reflux     Afib     Anemia     Blind right eye     Bradycardia     History of loop recorder     approximately 2017 or thereabouts    Hyperlipidemia     Macular degeneration     Mobility poor     cane/walker PRN    Palpitations        Past Surgical History:     Past Surgical History:   Procedure Laterality Date    CARDIAC CATHETERIZATION      CARDIAC CATHETERIZATION N/A 09/02/2022    Procedure: Left and Right Heart Cath with Coronary Angiography;  Surgeon: Otis Patel MD;  Location: [facility] CATH INVASIVE LOCATION;  Service: Cardiovascular;  Laterality: N/A;    CARDIAC ELECTROPHYSIOLOGY PROCEDURE Right 1/24/2024    Procedure: Ablation atrial flutter;  Surgeon: Branden Hernandez MD;  Location: [facility] CATH INVASIVE LOCATION;  Service: Cardiovascular;  Laterality: Right;    FOOT SURGERY      GALLBLADDER SURGERY      HYSTERECTOMY      TOTAL HIP ARTHROPLASTY Bilateral     TOTAL KNEE ARTHROPLASTY Right 11/29/2024     Procedure: TOTAL KNEE ARTHROPLASTY WITH CORI ROBOT;  Surgeon: Elias Sewell II, MD;  Location: [facility] MAIN OR;  Service: Robotics - Ortho;  Laterality: Right;       Social History:   Social History     Socioeconomic History    Marital status:    Tobacco Use    Smoking status: Former     Passive exposure: Past    Smokeless tobacco: Former   Vaping Use    Vaping status: Never Used   Substance and Sexual Activity    Alcohol use: Yes     Alcohol/week: 7.0 standard drinks of alcohol     Types: 7 Glasses of wine per week     Comment: wine with dinner    Drug use: Never    Sexual activity: Defer       Procedures Performed         Consults:   Consults       Date and Time Order Name Status Description    12/14/2024  1:49 AM Inpatient Orthopedic Surgery Consult              Condition on Discharge:     Stable    Discharge Disposition      Discharge Medications     Discharge Medications        ASK your doctor about these medications        Instructions Start Date   acetaminophen 500 MG tablet  Commonly known as: TYLENOL   2 tablets, Nightly      apixaban 5 MG tablet tablet  Commonly known as: ELIQUIS   5 mg, Oral, Every 12 Hours Scheduled      Azelastine HCl 137 MCG/SPRAY solution   Administer 2 sprays into the nostril(s) as directed by provider 2 (Two) Times a Day. Indications: Perennial Allergic Rhinitis      calcium carbonate 600 MG tablet  Commonly known as: OS-RAVEN   1 tablet, 2 Times Daily With Meals      cholecalciferol 25 MCG (1000 UT) tablet  Commonly known as: VITAMIN D3   1 tablet, Daily      citalopram 10 MG tablet  Commonly known as: CeleXA   1 tablet, Every Evening      [employer] Fiber Gummies chewable tablet   1 tablet, Daily PRN      diphenhydrAMINE 25 mg capsule  Commonly known as: BENADRYL   1 capsule, Every 6 Hours PRN      diphenhydrAMINE 25 mg capsule  Commonly known as: BENADRYL   2 capsules, Nightly      MiraLax 17 g packet  Generic drug: polyethylene glycol   17 g, Daily PRN      montelukast  10 MG tablet  Commonly known as: SINGULAIR   1 tablet, Nightly      omeprazole 40 MG capsule  Commonly known as: priLOSEC   1 capsule, Daily      ondansetron 4 MG tablet  Commonly known as: Zofran   4 mg, Oral, Every 6 Hours PRN      oxybutynin XL 5 MG 24 hr tablet  Commonly known as: DITROPAN-XL   1 tablet by mouth daily      oxyCODONE-acetaminophen 5-325 MG per tablet  Commonly known as: PERCOCET   1 tablet, Oral, Every 4 Hours PRN      Polyvinyl Alcohol-Povidone PF 1.4-0.6 % ophthalmic solution  Commonly known as: ARTIFICIAL TEARS   1-2 drops, As Needed      propranolol  MG 24 hr capsule  Commonly known as: INDERAL LA   120 mg, Oral, Every Morning      Restasis 0.05 % ophthalmic emulsion  Generic drug: cycloSPORINE   1 drop, Every 12 Hours      VITAMIN B 12 PO   1,000 mcg, Daily               Discharge Diet:     Activity at Discharge:     Follow-up Appointments  Future Appointments   Date Time Provider Department Center   4/28/2025  1:30 PM Branden Hernandez MD [facility]         Test Results Pending at Discharge  Pending Results       Procedure [Order ID] Specimen - Date/Time    Blood Culture - Blood, Arm, Left [[id number]] Collected: 12/13/24 2359    Specimen: Blood from Arm, Left Updated: 12/14/24 0004    Blood Culture - Blood, Wrist, Left [[id number]] Collected: 12/13/24 2359    Specimen: Blood from Wrist, Left Updated: 12/14/24 0011             Risk for Readmission (LACE) Score: 2 (12/14/2024  6:00 AM)      Less Than 30 minutes spent in discharge activities for this patient    Signature:Electronically signed by Holly Angel PA-C, 12/14/24, 10:08 AM EST.

## 2024-12-14 NOTE — PROGRESS NOTES
Called about this patient being readmitted for observation.  After reviewing her labs I think she is appropriate for discharge and follow-up in the clinic on Monday.  We would be happy to come by and see her on Monday should she still be in the hospital, although I do not think it is necessary to keep her there for a prolonged time.  This is not something that will require surgical intervention.  She has a normal sedimentation rate and CRP is only mildly elevated.  She did have a fall but fortunately has not damaged the knee components.  She can be seen in the office on Monday by my nurse practitioner Valorie.  There is no need for acute hospitalization at this time.

## 2024-12-14 NOTE — ED PROVIDER NOTES
Subjective   History of Present Illness  79-year-old female who is 2 weeks postop from right total knee proximal to this facility on 11/29/2024 for complaints of nontraumatic increase the pain/swelling/redness/drainage for the past 3 days      Review of Systems   Musculoskeletal:         As per HPI       Past Medical History:   Diagnosis Date    Acid reflux     Afib     Anemia     Blind right eye     Bradycardia     History of loop recorder     approximately 2017 or thereabouts    Hyperlipidemia     Macular degeneration     Mobility poor     cane/walker PRN    Palpitations        Allergies   Allergen Reactions    Contrast Dye (Echo Or Unknown Ct/Mr) Unknown (See Comments)    Hydrocodone GI Intolerance    Oxycodone GI Intolerance    Sulfamethoxazole-Trimethoprim Hives    Cyclobenzaprine Other (See Comments)     Off balance    Loratadine Other (See Comments)    Tramadol Hcl Unknown (See Comments)       Past Surgical History:   Procedure Laterality Date    CARDIAC CATHETERIZATION      CARDIAC CATHETERIZATION N/A 09/02/2022    Procedure: Left and Right Heart Cath with Coronary Angiography;  Surgeon: Otis Patel MD;  Location: [facility] CATH INVASIVE LOCATION;  Service: Cardiovascular;  Laterality: N/A;    CARDIAC ELECTROPHYSIOLOGY PROCEDURE Right 1/24/2024    Procedure: Ablation atrial flutter;  Surgeon: Branden Hernandez MD;  Location: [facility] CATH INVASIVE LOCATION;  Service: Cardiovascular;  Laterality: Right;    FOOT SURGERY      GALLBLADDER SURGERY      HYSTERECTOMY      TOTAL HIP ARTHROPLASTY Bilateral     TOTAL KNEE ARTHROPLASTY Right 11/29/2024    Procedure: TOTAL KNEE ARTHROPLASTY WITH CORI ROBOT;  Surgeon: Elias Sewell II, MD;  Location: [facility] MAIN OR;  Service: Robotics - Ortho;  Laterality: Right;       Family History   Problem Relation Age of Onset    Hypertension Mother     Asthma Mother        Social History     Socioeconomic History    Marital status:    Tobacco Use    Smoking  status: Former     Passive exposure: Past    Smokeless tobacco: Former   Vaping Use    Vaping status: Never Used   Substance and Sexual Activity    Alcohol use: Yes     Alcohol/week: 7.0 standard drinks of alcohol     Types: 7 Glasses of wine per week     Comment: wine with dinner    Drug use: Never    Sexual activity: Defer           Objective   Physical Exam  Constitutional:       Appearance: Normal appearance.   HENT:      Head: Normocephalic and atraumatic.   Cardiovascular:      Rate and Rhythm: Normal rate and regular rhythm.      Heart sounds: Normal heart sounds.   Pulmonary:      Effort: Pulmonary effort is normal.      Breath sounds: Normal breath sounds.   Abdominal:      General: Bowel sounds are normal.      Palpations: Abdomen is soft.   Musculoskeletal:      Comments: Mild swelling to right lower extremity, right knee with mild associated erythema, there is warmth, no drainage appreciated, no wound dehiscence, pain with decreased range of motion noted.  Calf soft and nontender   Skin:     General: Skin is warm and dry.      Capillary Refill: Capillary refill takes less than 2 seconds.   Neurological:      General: No focal deficit present.      Mental Status: She is alert.   Psychiatric:         Mood and Affect: Mood normal.         Behavior: Behavior normal.         Procedures           ED Course                                                       Medical Decision Making  Patient appears well, still complains of moderate pain.  Patient does clarify that she was prescribed doxycycline per her orthopedic surgeon on 12/11/2024.  Differential would include postoperative pain and swelling versus cellulitis versus infected right knee versus DVT.  Will give IV antibiotics, a.m. orthopedic consultation, a.m. Doppler, manage pain.  Will place in ED observation.    Problems Addressed:  Postoperative pain of right knee: complicated acute illness or injury    Amount and/or Complexity of Data Reviewed  Labs:  ordered.  Radiology: ordered.    Risk  Prescription drug management.  Decision regarding hospitalization.        Final diagnoses:   Postoperative pain of right knee       ED Disposition  ED Disposition       ED Disposition   Decision to Admit    Condition   --    Comment   --               No follow-up provider specified.       Medication List      No changes were made to your prescriptions during this visit.            Daniel Gasca MD  12/14/24 0151

## 2024-12-14 NOTE — ED NOTES
Nursing report ED to floor  Joie Godinez  79 y.o.  female    HPI:   Chief Complaint   Patient presents with    Post-op Problem       Admitting doctor:   Daniel Gasca MD    Admitting diagnosis:   The encounter diagnosis was Postoperative pain of right knee.    Code status:   Current Code Status       Date Active Code Status Order ID Comments User Context       Prior            Allergies:   Contrast dye (echo or unknown ct/mr), Hydrocodone, Oxycodone, Sulfamethoxazole-trimethoprim, Cyclobenzaprine, Loratadine, and Tramadol hcl    Isolation:  No active isolations     Fall Risk:  Fall Risk Assessment was completed, and patient is at moderate risk for falls.   Predictive Model Details         15 (Low) Factor Value    Calculated 12/14/2024 02:25 Age 79    Risk of Fall Model Active Peripheral IV Present     Imaging order in this encounter Present     Respiratory Rate 20     Magnesium not on file     Tam Scale not on file     Diastolic BP 72     Number of Distinct Medication Classes administered 2     Albumin 3.6 g/dL     Chloride 100 mmol/L     Total Bilirubin 0.8 mg/dL     Calcium 9 mg/dL     Days after Admission 0.138     Creatinine 0.66 mg/dL     Tobacco Use Quit     ALT 16 U/L     Potassium 3.7 mmol/L         Weight:       12/13/24  2301   Weight: 74.8 kg (164 lb 14.5 oz)       Intake and Output  No intake or output data in the 24 hours ending 12/14/24 0230    Diet:   Dietary Orders (From admission, onward)       Start     Ordered    12/14/24 0149  Diet: Regular/House; Fluid Consistency: Thin (IDDSI 0)  Diet Effective Now        References:    Diet Order [place]   Question Answer Comment   Diets: Regular/House    Fluid Consistency: Thin (IDDSI 0)        12/14/24 0149                     Most recent vitals:   Vitals:    12/13/24 2347 12/14/24 0003 12/14/24 0207 12/14/24 0214   BP: 150/56 138/82 168/72 164/75   Pulse: 77 81 83 83   Resp:       Temp:       SpO2: 98% 98% 97% 97%   Weight:       Height:            Active LDAs/IV Access:   Lines, Drains & Airways       Active LDAs       Name Placement date Placement time Site Days    Peripheral IV 12/14/24 0000 Left Antecubital 12/14/24  0000  Antecubital  less than 1                    Skin Condition:   Skin Assessments (last day)       None             Labs (abnormal labs have a star):   Labs Reviewed   COMPREHENSIVE METABOLIC PANEL - Abnormal; Notable for the following components:       Result Value    Glucose 109 (*)     All other components within normal limits    Narrative:     GFR Categories in Chronic Kidney Disease (CKD)      GFR Category          GFR (mL/min/1.73)    Interpretation  G1                     90 or greater         Normal or high (1)  G2                      60-89                Mild decrease (1)  G3a                   45-59                Mild to moderate decrease  G3b                   30-44                Moderate to severe decrease  G4                    15-29                Severe decrease  G5                    14 or less           Kidney failure          (1)In the absence of evidence of kidney disease, neither GFR category G1 or G2 fulfill the criteria for CKD.    eGFR calculation 2021 CKD-EPI creatinine equation, which does not include race as a factor   C-REACTIVE PROTEIN - Abnormal; Notable for the following components:    C-Reactive Protein 3.03 (*)     All other components within normal limits   CBC WITH AUTO DIFFERENTIAL - Abnormal; Notable for the following components:    WBC 11.02 (*)     RBC 2.78 (*)     Hemoglobin 8.1 (*)     Hematocrit 25.7 (*)     Lymphocyte % 12.3 (*)     Eosinophil % 9.7 (*)     Neutrophils, Absolute 7.58 (*)     Eosinophils, Absolute 1.07 (*)     Immature Grans, Absolute 0.06 (*)     All other components within normal limits   SEDIMENTATION RATE - Normal   POC LACTATE - Normal   BLOOD CULTURE   BLOOD CULTURE   RAINBOW DRAW    Narrative:     The following orders were created for panel order [facility]  Draw.  Procedure                               Abnormality         Status                     ---------                               -----------         ------                     Green Top (Gel)[[id number]]                                  Final result               Lavender Top[[id number]]                                     Final result               Gold Top - SST[[id number]]                                   Final result               Light Blue Top[[id number]]                                   Final result                 Please view results for these tests on the individual orders.   POC LACTATE   CBC AND DIFFERENTIAL    Narrative:     The following orders were created for panel order CBC & Differential.  Procedure                               Abnormality         Status                     ---------                               -----------         ------                     CBC Auto Differential[[id number]]        Abnormal            Final result                 Please view results for these tests on the individual orders.   GREEN TOP   LAVENDER TOP   GOLD TOP - SST   LIGHT BLUE TOP       LOC: Person, Place, Time, and Situation    Telemetry:  Observation Unit    Cardiac Monitoring Ordered: no    EKG:   No orders to display       Medications Given in the ED:   Medications   sodium chloride 0.9 % flush 10 mL (has no administration in time range)   vancomycin IVPB 1500 mg in 0.9% NaCl (Premix) 500 mL (has no administration in time range)   cefTRIAXone (ROCEPHIN) 1,000 mg in sodium chloride 0.9 % 100 mL MBP (1,000 mg Intravenous New Bag 12/14/24 0203)   morphine injection 2 mg (has no administration in time range)   ondansetron (ZOFRAN) injection 4 mg (has no administration in time range)       Imaging results:  XR Knee 3 View Right    Result Date: 12/13/2024  Impression: Postoperative changes from right total knee arthroplasty. There is a moderate size suprapatellar joint effusion. There is diffuse swelling of the  knee. Underlying infection is not excluded. Electronically Signed: Isreal Herr MD  12/13/2024 11:48 PM EST  Workstation ID: [id number]     Social issues:   Social History     Socioeconomic History    Marital status:    Tobacco Use    Smoking status: Former     Passive exposure: Past    Smokeless tobacco: Former   Vaping Use    Vaping status: Never Used   Substance and Sexual Activity    Alcohol use: Yes     Alcohol/week: 7.0 standard drinks of alcohol     Types: 7 Glasses of wine per week     Comment: wine with dinner    Drug use: Never    Sexual activity: Defer       NIH Stroke Scale:  Interval: (not recorded)  1a. Level of Consciousness: (not recorded)  1b. LOC Questions: (not recorded)  1c. LOC Commands: (not recorded)  2. Best Gaze: (not recorded)  3. Visual: (not recorded)  4. Facial Palsy: (not recorded)  5a. Motor Arm, Left: (not recorded)  5b. Motor Arm, Right: (not recorded)  6a. Motor Leg, Left: (not recorded)  6b. Motor Leg, Right: (not recorded)  7. Limb Ataxia: (not recorded)  8. Sensory: (not recorded)  9. Best Language: (not recorded)  10. Dysarthria: (not recorded)  11. Extinction and Inattention (formerly Neglect): (not recorded)    Total (NIH Stroke Scale): (not recorded)     Additional notable assessment information:     Nursing report ED to floor:      Kathya Hair RN   12/14/24 02:30 EST

## 2024-12-14 NOTE — PLAN OF CARE
Problem: Adult Inpatient Plan of Care  Goal: Plan of Care Review  12/14/2024 0540 by Citlalli Tinsley RN  Outcome: Progressing  12/14/2024 0421 by Citlalli Tinsley RN  Outcome: Progressing  Flowsheets (Taken 12/14/2024 0421)  Progress: no change  Plan of Care Reviewed With: patient  Goal: Patient-Specific Goal (Individualized)  12/14/2024 0540 by Citlalli Tinsley RN  Outcome: Progressing  12/14/2024 0421 by Citlalli Tinsley RN  Outcome: Progressing  Goal: Absence of Hospital-Acquired Illness or Injury  12/14/2024 0540 by Citlalli Tinsley RN  Outcome: Progressing  12/14/2024 0421 by Citlalli Tinsley RN  Outcome: Progressing  Goal: Optimal Comfort and Wellbeing  12/14/2024 0540 by Citlalli Tinsley RN  Outcome: Progressing  12/14/2024 0421 by Citlalli Tinsley RN  Outcome: Progressing  Goal: Readiness for Transition of Care  12/14/2024 0540 by Citlalli Tinsley RN  Outcome: Progressing  12/14/2024 0421 by Citlalli Tinsley RN  Outcome: Progressing  Intervention: Mutually Develop Transition Plan  Recent Flowsheet Documentation  Taken 12/14/2024 0439 by Citlalli Tinsley RN  Transportation Anticipated: family or friend will provide  Patient/Family Anticipated Services at Transition: none  Patient/Family Anticipates Transition to: home with family  Taken 12/14/2024 0426 by Citlalli Tinsley RN  Equipment Currently Used at Home: none     Problem: Pain Acute  Goal: Optimal Pain Control and Function  12/14/2024 0540 by Citlalli Tinsley RN  Outcome: Progressing  12/14/2024 0421 by Citlalli Tinsley RN  Outcome: Progressing     Problem: Fall Injury Risk  Goal: Absence of Fall and Fall-Related Injury  12/14/2024 0540 by Citlalli Tinsley RN  Outcome: Progressing  12/14/2024 0421 by Citlalli Tinsley RN  Outcome: Progressing     Problem: Breathing Pattern Ineffective  Goal: Effective Breathing Pattern  12/14/2024 0540 by Citlalli Tinsley RN  Outcome: Progressing  12/14/2024 0421 by Citlalli Tinsley, RN  Outcome:  Progressing     Problem: Skin Injury Risk Increased  Goal: Skin Health and Integrity  Outcome: Progressing     Problem: Comorbidity Management  Goal: Maintenance of Asthma Control  Outcome: Progressing  Goal: Maintenance of Behavioral Health Symptom Control  Outcome: Progressing  Goal: Maintenance of COPD Symptom Control  Outcome: Progressing  Goal: Blood Glucose Level Within Target Range  Outcome: Progressing  Goal: Maintenance of Heart Failure Symptom Control  Outcome: Progressing  Goal: Blood Pressure in Desired Range  Outcome: Progressing  Goal: Maintenance of Osteoarthritis Symptom Control  Outcome: Progressing  Goal: Bariatric Home Regimen Maintained  Outcome: Progressing  Goal: Maintenance of Seizure Control  Outcome: Progressing     Problem:  Fall Injury Risk  Goal: Absence of Fall, Infant Drop and Related Injury  Outcome: Progressing   Goal Outcome Evaluation:  Plan of Care Reviewed With: patient        Progress: no change     Patient in the bathroom on the toilet, Tech waiting outside of the bathroom door, patient attempted to get up on her own without the asistance of the tech and missed the grab bar going down to the ground, Tech went into the bathroom and patient was on the ground in sitting position, fall was unwitnessed, patient alert and oriented X4, denies pain, small skin tear to her right upper posterior arm, Family and MD notified

## 2024-12-16 NOTE — CASE MANAGEMENT/SOCIAL WORK
Case Management Discharge Note      Final Note: Routine home.         Selected Continued Care - Discharged on 12/14/2024 Admission date: 12/13/2024 - Discharge disposition: Home or Self Care     Transportation Services  Private: Car    Final Discharge Disposition Code: 01 - home or self-care

## 2024-12-18 ENCOUNTER — TELEPHONE (OUTPATIENT)
Dept: SURGERY | Facility: HOSPITAL | Age: 79
End: 2024-12-18
Payer: MEDICARE

## 2024-12-18 NOTE — TELEPHONE ENCOUNTER
Discharge Instructions:    Ask patient about his or her discharge instructions  Patient confirmed understanding:  Further instruction needed:    What, if any, recommendations, teaching, or interventions did you provide?     Health status:    Pain controlled: no    Recommended interventions: Continue to utilize narcotic pain relief prn as well as ice.     Incision/dressing status:      LISA Instruction:     Difficulties with urination:  no    Last BM: 12/15/2024  *If no BM by day 3-recommend OTC suppository or fleets enema.    Medications:    Medications reviewed with patient/family/caregiver: yes    Patient taking medications as prescribed?  yes    If not taking medications as prescribed, note specific medicine(s) and reason for each:     Hospital Follow Up Plan:    Follow up Appointment with Orthopedic surgeon:  [x]Has f/u appointment  []Scheduled f/u appointment    Home Care ordered at discharge?     Home Care started, or contact made? Pt reports having completed HH PT. Pt reports having had apt for OP PT, this was cancelled due to recent fall and increasing pain. Offered to contact/reschedule, pt wishes to call at her convenience.     If no, action taken:     DME obtained/used in home?   Other information:

## 2024-12-19 LAB
BACTERIA SPEC AEROBE CULT: NORMAL
BACTERIA SPEC AEROBE CULT: NORMAL

## 2025-01-09 ENCOUNTER — OFFICE (AMBULATORY)
Dept: URBAN - METROPOLITAN AREA CLINIC 64 | Facility: CLINIC | Age: 80
End: 2025-01-09
Payer: MEDICARE

## 2025-01-09 VITALS
WEIGHT: 151 LBS | HEART RATE: 74 BPM | DIASTOLIC BLOOD PRESSURE: 62 MMHG | HEIGHT: 65 IN | SYSTOLIC BLOOD PRESSURE: 105 MMHG

## 2025-01-09 DIAGNOSIS — K21.9 GASTRO-ESOPHAGEAL REFLUX DISEASE WITHOUT ESOPHAGITIS: ICD-10-CM

## 2025-01-09 DIAGNOSIS — R10.31 RIGHT LOWER QUADRANT PAIN: ICD-10-CM

## 2025-01-09 PROCEDURE — 99213 OFFICE O/P EST LOW 20 MIN: CPT | Performed by: NURSE PRACTITIONER

## 2025-01-09 RX ORDER — OMEPRAZOLE 40 MG/1
40 CAPSULE, DELAYED RELEASE ORAL
Qty: 90 | Refills: 3 | Status: ACTIVE
Start: 2025-01-09

## 2025-01-09 RX ORDER — DOCUSATE SODIUM 100 MG/1
CAPSULE ORAL
Qty: 60 | Refills: 11 | Status: ACTIVE
Start: 2025-01-09

## 2025-04-28 ENCOUNTER — OFFICE VISIT (OUTPATIENT)
Dept: CARDIOLOGY | Facility: CLINIC | Age: 80
End: 2025-04-28
Payer: MEDICARE

## 2025-04-28 VITALS
HEIGHT: 65 IN | SYSTOLIC BLOOD PRESSURE: 100 MMHG | WEIGHT: 174 LBS | HEART RATE: 68 BPM | BODY MASS INDEX: 28.99 KG/M2 | OXYGEN SATURATION: 96 % | DIASTOLIC BLOOD PRESSURE: 58 MMHG

## 2025-04-28 DIAGNOSIS — I48.0 PAROXYSMAL ATRIAL FIBRILLATION: Primary | ICD-10-CM

## 2025-04-28 DIAGNOSIS — I10 ESSENTIAL HYPERTENSION: ICD-10-CM

## 2025-04-28 DIAGNOSIS — Z86.79 STATUS POST ABLATION OF ATRIAL FIBRILLATION: ICD-10-CM

## 2025-04-28 DIAGNOSIS — Z98.890 STATUS POST ABLATION OF ATRIAL FIBRILLATION: ICD-10-CM

## 2025-04-28 DIAGNOSIS — I48.3 TYPICAL ATRIAL FLUTTER: ICD-10-CM

## 2025-04-28 PROCEDURE — 1159F MED LIST DOCD IN RCRD: CPT | Performed by: INTERNAL MEDICINE

## 2025-04-28 PROCEDURE — 3078F DIAST BP <80 MM HG: CPT | Performed by: INTERNAL MEDICINE

## 2025-04-28 PROCEDURE — 99214 OFFICE O/P EST MOD 30 MIN: CPT | Performed by: INTERNAL MEDICINE

## 2025-04-28 PROCEDURE — 1160F RVW MEDS BY RX/DR IN RCRD: CPT | Performed by: INTERNAL MEDICINE

## 2025-04-28 PROCEDURE — 93000 ELECTROCARDIOGRAM COMPLETE: CPT | Performed by: INTERNAL MEDICINE

## 2025-04-28 PROCEDURE — 3074F SYST BP LT 130 MM HG: CPT | Performed by: INTERNAL MEDICINE

## 2025-04-28 NOTE — PROGRESS NOTES
CC--- intermittent  atrial arrhythmias     Sub  80-year-old melania patient had recurrent atrial arrhythmias and underwent EP study and ablation for atrial flutter and cryo AF ablation in 2024 and comes in for follow-up  Previous cardiac catheterization without significant coronary artery stenosis and patient had a prior loop recorder implantation with battery depletion.  Patient had prior history of stroke and prior history of anemia and acid reflux and TSH is normal and MARIA ISABEL in the past revealed normal EF and mild MR and mild to moderate LVH with mild left atrial enlargement    My previous history attached below  79-year-old melania patient has recurrent atrial arrhythmias and underwent EP study and ablation of right atrial flutter and cryo AF ablation January 2024 and comes in for follow-up.  Previous cardiac catheterization without significant coronary artery stenosis and patient had a prior loop recorder implantation with battery depletion.  She does have history of prior stroke and prior history of anemia and acid reflux and TSH is normal and MARIA ISABEL in the past revealed normal LVEF with mild MR and mild to moderate LVH with mild left atrial enlargement.    Patient recently had a mechanical fall and had prior history of falls and most recent fall has happened after long time without any loss of consciousness.  She complains of bleeding locally from the site which she fell.  She complains of left leg edema.  And no redness      Past Medical History:   Diagnosis Date   • Acid reflux    • Anemia    • Bradycardia    • History of loop recorder     approximately 2017 or thereabouts   • Hyperlipidemia    • Macular degeneration    • Mobility poor     cane/walker PRN   • Palpitations      Past Surgical History:   Procedure Laterality Date   • CARDIAC CATHETERIZATION     • CARDIAC CATHETERIZATION N/A 09/02/2022    Procedure: Left and Right Heart Cath with Coronary Angiography;  Surgeon: Otis Patel MD;  Location:   [facility] CATH INVASIVE LOCATION;  Service: Cardiovascular;  Laterality: N/A;   • CARDIAC ELECTROPHYSIOLOGY PROCEDURE Right 1/24/2024    Procedure: Ablation atrial flutter;  Surgeon: Branden Hernandez MD;  Location: [facility] CATH INVASIVE LOCATION;  Service: Cardiovascular;  Laterality: Right;   • FOOT SURGERY     • GALLBLADDER SURGERY     • HYSTERECTOMY     • TOTAL HIP ARTHROPLASTY Bilateral          Physical Exam    General:      well developed, well nourished, in no acute distress.    Head:      normocephalic and atraumatic.    Eyes:      PERRL/EOM intact, conjunctivae and sclerae clear without nystagmus.    Neck:      no  thyromegaly, trachea central with normal respiratory effort  Lungs:      clear bilaterally to auscultation.    Heart:       regular rate and rhythm, S1, S2 without murmurs, rubs, or gallops  Skin:      intact without lesions or rashes.    Psych:      alert and cooperative; normal mood and affect; normal attention span and concentration.        Assessment plan   History of symptomatic right atrial flutter and atrial fibrillation with prior ablation currently in sinus rhythm on apixaban  Hypertension  with LVH currently treated with propranolol  History of mild MR and mild left atrial enlargement  History of acid reflux on omeprazole  Medications reviewed and follow-up appointments made  Eliquis refilled  Patient had a history of recurrent mechanical falls and underwent knee surgery and feels much better without any further falls and no bleeding diathesis        ECG 12 Lead    Date/Time: 4/28/2025 2:10 PM  Performed by: Branden Hernandez MD    Authorized by: Branden Hernandez MD  Comparison: compared with previous ECG   Similar to previous ECG  Rate: normal  Conduction: incomplete right bundle branch block      Electronically signed by Branden Hernandez MD, 04/28/25, 2:10 PM EDT.

## 2025-07-12 ENCOUNTER — APPOINTMENT (OUTPATIENT)
Dept: GENERAL RADIOLOGY | Facility: HOSPITAL | Age: 80
End: 2025-07-12
Payer: MEDICARE

## 2025-07-12 ENCOUNTER — HOSPITAL ENCOUNTER (EMERGENCY)
Facility: HOSPITAL | Age: 80
Discharge: HOME | End: 2025-07-12
Payer: MEDICARE

## 2025-07-12 ENCOUNTER — APPOINTMENT (OUTPATIENT)
Dept: CT IMAGING | Facility: HOSPITAL | Age: 80
End: 2025-07-12
Payer: MEDICARE

## 2025-07-12 VITALS
RESPIRATION RATE: 18 BRPM | DIASTOLIC BLOOD PRESSURE: 64 MMHG | TEMPERATURE: 98 F | HEIGHT: 65 IN | HEART RATE: 65 BPM | WEIGHT: 160 LBS | SYSTOLIC BLOOD PRESSURE: 144 MMHG | BODY MASS INDEX: 26.66 KG/M2 | OXYGEN SATURATION: 94 %

## 2025-07-12 DIAGNOSIS — S01.111A LACERATION OF RIGHT EYEBROW, INITIAL ENCOUNTER: ICD-10-CM
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
## 2025-07-12 DIAGNOSIS — S80.211A ABRASION OF RIGHT KNEE, INITIAL ENCOUNTER: ICD-10-CM

## 2025-07-12 DIAGNOSIS — S09.90XA CLOSED HEAD INJURY, INITIAL ENCOUNTER: ICD-10-CM

## 2025-07-12 PROCEDURE — 70486 CT MAXILLOFACIAL W/O DYE: CPT

## 2025-07-12 PROCEDURE — 90471 IMMUNIZATION ADMIN: CPT

## 2025-07-12 PROCEDURE — 90715 TDAP VACCINE 7 YRS/> IM: CPT

## 2025-07-12 PROCEDURE — 25010000002 LIDOCAINE 1% - EPINEPHRINE 1:100000 1 %-1:100000 SOLUTION

## 2025-07-12 PROCEDURE — 72125 CT NECK SPINE W/O DYE: CPT

## 2025-07-12 PROCEDURE — 70450 CT HEAD/BRAIN W/O DYE: CPT

## 2025-07-12 PROCEDURE — 99284 EMERGENCY DEPT VISIT MOD MDM: CPT

## 2025-07-12 PROCEDURE — 25010000002 TETANUS-DIPHTH-ACELL PERTUSSIS 5-2.5-18.5 LF-MCG/0.5 SUSPENSION PREFILLED SYRINGE

## 2025-07-12 PROCEDURE — 73562 X-RAY EXAM OF KNEE 3: CPT

## 2025-07-12 RX ORDER — LIDOCAINE HYDROCHLORIDE AND EPINEPHRINE 10; 10 MG/ML; UG/ML
10 INJECTION, SOLUTION INFILTRATION; PERINEURAL ONCE
Status: COMPLETED | OUTPATIENT
Start: 2025-07-12 | End: 2025-07-12

## 2025-07-12 RX ADMIN — TETANUS TOXOID, REDUCED DIPHTHERIA TOXOID AND ACELLULAR PERTUSSIS VACCINE, ADSORBED 0.5 ML: 5; 2.5; 8; 8; 2.5 SUSPENSION INTRAMUSCULAR at 14:16

## 2025-07-12 RX ADMIN — LIDOCAINE HYDROCHLORIDE AND EPINEPHRINE 10 ML: 10; 10 INJECTION, SOLUTION INFILTRATION; PERINEURAL at 13:50

## 2025-07-12 NOTE — DISCHARGE INSTRUCTIONS
Use ice 20 minutes at a time several times a day.  Wash twice a day with soap water.  May use Neosporin or bacitracin.  Take Tylenol for headache or discomfort, do not exceed 4000 mg/day of Tylenol.  Follow up with your family doctor for suture removal in 7-10 days.  Watch for signs of infection.  Return for any new or worsening symptoms

## 2025-07-12 NOTE — ED PROVIDER NOTES
Subjective   History of Present Illness  Chief complaint: Mechanical fall, hit head, negative LOC, laceration over right eye      Context: Patient is an 80-year-old female with a history of anemia, hyperlipidemia, A-fib, bradycardia, acid reflux who presents to the ER for complaint of [place] [employer] [place].  Patient reports she was looking down at the curb that she was going to step off of it and did not realize that there was a buckle into the concrete for the curb and tripped and fell.  Patient reports she struck the right side of her face on the concrete, denies any loss of consciousness, states she is on Eliquis.  Patient denies any headache, visual changes, unilateral weakness, chest pain, shortness of air, fever, abdominal pain, nausea/vomiting/diarrhea or urinary symptoms.  Patient also reports an abrasion to the right knee she states that history of a knee replacement in that knee states she must of hit it also denies any pain.      PCP: Pedro Sen    LMP: Postmenopausal          Review of Systems   Constitutional:  Negative for fever.       Past Medical History:   Diagnosis Date    Acid reflux     Afib     Anemia     Blind right eye     Bradycardia     History of loop recorder     approximately 2017 or thereabouts    Hyperlipidemia     Macular degeneration     Mobility poor     cane/walker PRN    Palpitations        Allergies   Allergen Reactions    Contrast Dye (Echo Or Unknown Ct/Mr) Unknown (See Comments)    Hydrocodone GI Intolerance    Oxycodone GI Intolerance    Sulfamethoxazole-Trimethoprim Hives    Cyclobenzaprine Other (See Comments)     Off balance    Loratadine Other (See Comments)    Tramadol Hcl Unknown (See Comments)       Past Surgical History:   Procedure Laterality Date    CARDIAC CATHETERIZATION      CARDIAC CATHETERIZATION N/A 09/02/2022    Procedure: Left and Right Heart Cath with Coronary Angiography;  Surgeon: Otis Patel MD;  Location: [facility]  INVASIVE LOCATION;  Service: Cardiovascular;  Laterality: N/A;    CARDIAC ELECTROPHYSIOLOGY PROCEDURE Right 1/24/2024    Procedure: Ablation atrial flutter;  Surgeon: Branden Hernandez MD;  Location: [facility] CATH INVASIVE LOCATION;  Service: Cardiovascular;  Laterality: Right;    FOOT SURGERY      GALLBLADDER SURGERY      HYSTERECTOMY      TOTAL HIP ARTHROPLASTY Bilateral     TOTAL KNEE ARTHROPLASTY Right 11/29/2024    Procedure: TOTAL KNEE ARTHROPLASTY WITH CORI ROBOT;  Surgeon: Elias Sewell II, MD;  Location: [facility] MAIN OR;  Service: Robotics - Ortho;  Laterality: Right;       Family History   Problem Relation Age of Onset    Hypertension Mother     Asthma Mother        Social History     Socioeconomic History    Marital status:    Tobacco Use    Smoking status: Former     Passive exposure: Past    Smokeless tobacco: Former   Vaping Use    Vaping status: Never Used   Substance and Sexual Activity    Alcohol use: Yes     Alcohol/week: 7.0 standard drinks of alcohol     Types: 7 Glasses of wine per week     Comment: wine with dinner    Drug use: Never    Sexual activity: Defer           Objective   Physical Exam  Vitals and nursing note reviewed.   Constitutional:       Appearance: Normal appearance.   HENT:      Head: Normocephalic.      Right Ear: Tympanic membrane normal.      Left Ear: Tympanic membrane normal.      Ears:      Comments: No hemotympanum noted.     Nose: Nose normal.      Comments: No septal hematomas noted.     Mouth/Throat:      Mouth: Mucous membranes are moist.   Eyes:      Extraocular Movements: Extraocular movements intact.      Pupils: Pupils are equal, round, and reactive to light.   Pulmonary:      Effort: Pulmonary effort is normal.   Musculoskeletal:         General: No tenderness. Normal range of motion.      Cervical back: Normal range of motion. No tenderness or crepitus. No pain with movement, spinous process tenderness or muscular tenderness.   Skin:      General: Skin is warm and dry.      Capillary Refill: Capillary refill takes less than 2 seconds.      Findings: Abrasion and laceration present.          Neurological:      General: No focal deficit present.      Mental Status: She is alert and oriented to person, place, and time.      GCS: GCS eye subscore is 4. GCS verbal subscore is 5. GCS motor subscore is 6.      Cranial Nerves: No dysarthria or facial asymmetry.      Sensory: Sensation is intact.      Motor: No weakness or abnormal muscle tone.      Coordination: Coordination is intact.   Psychiatric:         Mood and Affect: Mood normal.         Behavior: Behavior normal.         Laceration Repair    Date/Time: 7/12/2025 2:04 PM    Performed by: Betzaida Dove APRN  Authorized by: Betzaida Dove APRN    Consent:     Consent obtained:  Verbal    Consent given by:  Patient    Risks, benefits, and alternatives were discussed: yes      Risks discussed:  Infection, pain, need for additional repair, retained foreign body, poor cosmetic result and poor wound healing    Alternatives discussed:  No treatment  Universal protocol:     Procedure explained and questions answered to patient or proxy's satisfaction: yes      Immediately prior to procedure, a time out was called: yes      Patient identity confirmed:  Verbally with patient and arm band  Anesthesia:     Anesthesia method:  Local infiltration    Local anesthetic:  Lidocaine 1% WITH epi  Laceration details:     Location:  Face    Face location:  R eyebrow    Length (cm):  1  Pre-procedure details:     Preparation:  Patient was prepped and draped in usual sterile fashion  Treatment:     Area cleansed with:  Chlorhexidine    Amount of cleaning:  Standard    Irrigation solution:  Sterile saline    Irrigation method:  Pressure wash    Visualized foreign bodies/material removed: no    Skin repair:     Repair method:  Sutures    Suture size:  5-0    Suture material:  Nylon    Number of sutures:   "3  Approximation:     Approximation:  Close  Repair type:     Repair type:  Simple  Post-procedure details:     Dressing:  Non-adherent dressing    Procedure completion:  Tolerated well, no immediate complications             ED Course          /68   Pulse 62   Temp 98 °F (36.7 °C)   Resp 18   Ht 165.1 cm (65\")   Wt 72.6 kg (160 lb)   SpO2 94%   BMI 26.63 kg/m²   Labs Reviewed - No data to display  Medications   Tetanus-Diphth-Acell Pertussis (BOOSTRIX) injection 0.5 mL (has no administration in time range)   lidocaine 1% - EPINEPHrine 1:518046 (XYLOCAINE W/EPI) 1 %-1:213280 injection 10 mL (10 mL Infiltration Given by Other 7/12/25 1350)     XR Knee 3 View Right  Result Date: 7/12/2025  Impression: Right knee arthroplasty noted in place. There is no evidence of hardware displacement, fracture or new malalignment. Trace suprapatellar joint effusion is present. Electronically Signed: Mustapha Salmon MD  7/12/2025 12:47 PM EDT  Workstation ID: [id number]    CT Head Without Contrast  Result Date: 7/12/2025  Impression: No acute intracranial pathology. No cervical spine fracture. No facial bone fracture. Electronically Signed: Donovan Duke MD  7/12/2025 12:11 PM EDT  Workstation ID: [id number]    CT Cervical Spine Without Contrast  Result Date: 7/12/2025  Impression: No acute intracranial pathology. No cervical spine fracture. No facial bone fracture. Electronically Signed: Donovan Duke MD  7/12/2025 12:11 PM EDT  Workstation ID: [id number]    CT Facial Bones Without Contrast  Result Date: 7/12/2025  Impression: No acute intracranial pathology. No cervical spine fracture. No facial bone fracture. Electronically Signed: Donovan Duke MD  7/12/2025 12:11 PM EDT  Workstation ID: [id number]                                                 Medical Decision Making  Radiology interpretation: CT head cervical spine and facial bones reviewed and interpreted by Landen: No acute intracranial pathology. No cervical spine " fracture. No facial bone fracture.  X-ray right knee reviewed and interpreted by Landen: Right knee arthroplasty noted in place. There is no evidence of hardware displacement, fracture or new malalignment. Trace suprapatellar joint effusion is present.  Further interpretation by radiologist as above    Labs were considered but were not emergently warranted at this time.    EKG was considered but was not emergently warranted at this time.    Scans were obtained to evaluate for ICH, fractures, subluxations, dislocations, hardware issues, this is not an all-inclusive list.  Patient is a 80-year-old female with a history of anemia, hyperlipidemia, A-fib, bradycardia and acid reflux who presents to the ER for above complaint.  On initial examination patient is resting comfortably in the bed, nontoxic in appearance with no signs and symptoms of distress.  Physical examination revealed 1 cm laceration to right eyebrow, bleeding under control, no hemotympanum noted, no septal hematomas noted, left eye PERRLA, patient has history of blindness in right eye, abrasion to right knee, no swelling tenderness erythema or warmth noted, distal pulses present, neurovascularly intact.  No tenderness noted to posterior cervical spine, no crepitus or step-off noted to cervical thoracic or lumbar spine.  Bruising noted over the right cheekbone.  Patient denied any pain and no medications were given at this time.  Patient CT and x-ray were unremarkable no signs of ICH fracture subluxations or facial bone fractures.  Patient's wound was cleaned and laceration repair was completed as charted above, patient tolerated well.  Patient unsure when last tetanus was, patient was given a tetanus booster.  Discussed decision to discharge.  Advised patient to rest, keep area clean dry, may wash with soap and water twice daily, may use bacitracin or Neosporin as needed.  May use Tylenol for headache or discomfort, do not exceed 4000 mg/day of Tylenol.   Advised her to watch for any signs of infection.  Advised her to call her primary care on Monday to schedule appointment for follow-up and suture removal in 7 to 10 days.  Advised to return to the ER for any new or worsening symptoms.  Patient and spouse verbalized understanding of all discharge instructions.    Appropriate PPE worn during exam.    i discussed findings with patient who voices understanding of discharge instructions, signs and symptoms requiring return to ED; discharged improved and in stable condition with follow up for re-evaluation.  This document is intended for medical expert use only. Reading of this document by patients and/or patient's family without participating medical staff guidance may result in misinterpretation and unintended morbidity.  Any interpretation of such data is the responsibility of the patient and/or family member responsible for the patient in concert with their primary or specialist providers, not to be left for sources of online searches such as [employer], [employer] or similar queries. Relying on these approaches to knowledge may result in misinterpretation, misguided goals of care and even death should patients or family members try recommendations outside of the realm of professional medical care in a supervised inpatient environment.         Problems Addressed:  Abrasion of right knee, initial encounter: complicated acute illness or injury  Closed head injury, initial encounter: complicated acute illness or injury  Fall, initial encounter: complicated acute illness or injury  Laceration of right eyebrow, initial encounter: complicated acute illness or injury    Amount and/or Complexity of Data Reviewed  Radiology: ordered.    Risk  Prescription drug management.        Final diagnoses:   Fall, initial encounter   Closed head injury, initial encounter   Laceration of right eyebrow, initial encounter   Abrasion of right knee, initial encounter       ED Disposition  ED  Disposition       ED Disposition   Discharge    Condition   Stable    Comment   --               Pedro Rivera MD  [address] [facility] [address]  [place] [address]  [phone]    Schedule an appointment as soon as possible for a visit   Call Monday to schedule an appointment for follow-up.         Medication List      No changes were made to your prescriptions during this visit.            Betzaida Dove, APRN  07/12/25 4385

## 2025-07-12 NOTE — ED NOTES
Pt arrives via EMS due to having a trip and fall at the [employer], which resulted in a lac to the R eyebrow area, and an abrasion to the R knee. Pt reports 7 out of 10 pain to the forehead. Pt denies any other issues.

## (undated) DEVICE — ANTIBACTERIAL UNDYED BRAIDED (POLYGLACTIN 910), SYNTHETIC ABSORBABLE SUTURE: Brand: COATED VICRYL

## (undated) DEVICE — PK TOTL KN 50

## (undated) DEVICE — ELECTRD DEFIB M/FUNC PROPADZ RADIOL 2PK

## (undated) DEVICE — CATH DIAG IMPULSE FL4 5F 100CM

## (undated) DEVICE — DUAL CUT SAGITTAL BLADE

## (undated) DEVICE — APPL CHLORAPREP HI/LITE 26ML ORNG

## (undated) DEVICE — BLANKT WARM UNDER/BDY FUL/ACC A/ 90X206CM

## (undated) DEVICE — THE STERILE LIGHT HANDLE COVER IS USED WITH STERIS SURGICAL LIGHTING AND VISUALIZATION SYSTEMS.

## (undated) DEVICE — Device

## (undated) DEVICE — PK TRY HEART CATH 50

## (undated) DEVICE — KT SURG TURNOVER 050

## (undated) DEVICE — GW FC J TFE/COAT .025 3MM 180CM

## (undated) DEVICE — SOL IRR NACL 0.9PCT BO 1000ML

## (undated) DEVICE — GLV SURG SENSICARE PI ORTHO SZ8.5 LF STRL

## (undated) DEVICE — NEEDLE, QUINCKE, 20GX3.5": Brand: MEDLINE

## (undated) DEVICE — SOL NACL 0.9PCT 1000ML

## (undated) DEVICE — SUT ETHIB 2 CV V37 MS/4 30IN MX69G

## (undated) DEVICE — COVER,MAYO STAND,STERILE: Brand: MEDLINE

## (undated) DEVICE — GW STAINLSS W SPRNG COIL 0.025 175CM

## (undated) DEVICE — INTRO SHEATH PREFACE BR MP .110IN 8F 62MM

## (undated) DEVICE — UNDERGLV SURG BIOGEL/PI PF SYNTH SURG SZ8.5 BLU 50/BX

## (undated) DEVICE — DISPOSABLE TOURNIQUET CUFF SINGLE BLADDER, SINGLE PORT AND QUICK CONNECT CONNECTOR: Brand: COLOR CUFF

## (undated) DEVICE — CATH DIAG IMPULSE PIG 5F 100CM

## (undated) DEVICE — CATH ABL QDOT MICRO BIDIR D/F CRV IDE

## (undated) DEVICE — PAD E/S GRND SGL/FOIL 9FT/CORD DISP

## (undated) DEVICE — TBG IV DRIP CHAMBER MACRO SGL 72IN

## (undated) DEVICE — KT VLV HEMO MAP ACC PLS LG/BORE MTL/INTRO W/TORQ/DEV

## (undated) DEVICE — CATH DIAG WEBSTER UNI DIR C/S F 6F 115CM

## (undated) DEVICE — CATH ULTRASND SOUNDSTAR GE 3D 8F 90CM

## (undated) DEVICE — TBG CONN IRR SMARTABLATE PUMP 1P/U

## (undated) DEVICE — CABL CONN CATH EP UMB 48IN

## (undated) DEVICE — ST ACC MICROPUNCTURE STFF/CANN PLAT/TP 4F 21G 40CM

## (undated) DEVICE — CATH DIAG IMPULSE FR4 5F 100CM

## (undated) DEVICE — DRSNG BURN ACTICOAT FLEX 7 1X24IN

## (undated) DEVICE — CATH ABL ARCTIC FRNT ADV 10.5F3.5X28MM

## (undated) DEVICE — CABL CONN CATH EP COAXL UMB 72IN

## (undated) DEVICE — PROXIMATE SKIN STAPLERS (35 WIDE) CONTAINS 35 STAINLESS STEEL STAPLES (FIXED HEAD): Brand: PROXIMATE

## (undated) DEVICE — PINNACLE INTRODUCER SHEATH: Brand: PINNACLE

## (undated) DEVICE — CABL CATH ABLAT ACHIEVE 196CM 1P/U

## (undated) DEVICE — 450 ML BOTTLE OF 0.05% CHLORHEXIDINE GLUCONATE IN 99.95% STERILE WATER FOR IRRIGATION, USP AND APPLICATOR.: Brand: IRRISEPT ANTIMICROBIAL WOUND LAVAGE

## (undated) DEVICE — WRAP KNEE COLD THERAPY

## (undated) DEVICE — ZIP 24 SURGICAL SKIN CLOSURE DEVICE, PSA: Brand: ZIP 24 SURGICAL SKIN CLOSURE DEVICE

## (undated) DEVICE — ADHS SKIN PREMIERPRO EXOFIN TOPICAL HI/VISC .5ML

## (undated) DEVICE — SUT ETHLN 2/0 PS 18IN 585H

## (undated) DEVICE — THE STERILE CAMERA HANDLE COVER IS FOR USE WITH THE STERIS SURGICAL LIGHTING AND VISUALIZATION SYSTEMS.

## (undated) DEVICE — SOL IRR H2O BO 1000ML STRL

## (undated) DEVICE — ST INTRO W/GW J/TIP .038IN 16F 13CM

## (undated) DEVICE — ADHS LIQ MASTISOL 2/3ML

## (undated) DEVICE — ZIPPERED TOGA, 2X LARGE: Brand: FLYTE

## (undated) DEVICE — PICO 7 10CM X 30CM: Brand: PICO™ 7

## (undated) DEVICE — SHEATH INTRO PINN CORNRY .038 9F 10CM

## (undated) DEVICE — CABL CONN ABL CARTO3 12TO34PIN 3M BLU

## (undated) DEVICE — CVR PROB ULTRASND W/GEL STRL 5X96IN

## (undated) DEVICE — PTCH MP CARTO3 EXT REF

## (undated) DEVICE — PENCL SMOKE/EVAC MEGADYNE TELESCP 15FT

## (undated) DEVICE — SOL ISO/ALC RUB 70PCT 4OZ

## (undated) DEVICE — SWAN-GANZ TRUE SIZE THERMODILUTION "S" TIP: Brand: SWAN-GANZ TRUE SIZE

## (undated) DEVICE — CABL EXT FOR/QDOT/MICRO TX ECO REUS

## (undated) DEVICE — CEMENT MIXING SYSTEM WITH FEMORAL BREAKWAY NOZZLE: Brand: REVOLUTION

## (undated) DEVICE — SHEATH FLXCATH STEER 12FR

## (undated) DEVICE — SYR LUERLOK 20CC BX/50

## (undated) DEVICE — SYR LUERLOK 30CC

## (undated) DEVICE — INTENDED TO SUPPORT AND MAINTAIN THE POSITION OF AN ANESTHETIZED PATIENT DURING SURGERY: Brand: HERMANTOR XL PINK KNEE POSITIONING PAD

## (undated) DEVICE — CATH ABL ACHIEVE MP 3.3F 20MM 165CM